# Patient Record
Sex: FEMALE | Race: ASIAN | NOT HISPANIC OR LATINO | Employment: PART TIME | URBAN - NONMETROPOLITAN AREA
[De-identification: names, ages, dates, MRNs, and addresses within clinical notes are randomized per-mention and may not be internally consistent; named-entity substitution may affect disease eponyms.]

---

## 2017-10-17 ENCOUNTER — APPOINTMENT (OUTPATIENT)
Dept: LAB | Facility: HOSPITAL | Age: 42
End: 2017-10-17

## 2017-10-17 ENCOUNTER — OFFICE VISIT (OUTPATIENT)
Dept: FAMILY MEDICINE CLINIC | Facility: CLINIC | Age: 42
End: 2017-10-17

## 2017-10-17 VITALS
HEART RATE: 65 BPM | HEIGHT: 65 IN | OXYGEN SATURATION: 97 % | SYSTOLIC BLOOD PRESSURE: 116 MMHG | BODY MASS INDEX: 30.54 KG/M2 | DIASTOLIC BLOOD PRESSURE: 64 MMHG | WEIGHT: 183.3 LBS

## 2017-10-17 DIAGNOSIS — I83.90 VARICOSE VEIN OF LEG: Primary | ICD-10-CM

## 2017-10-17 DIAGNOSIS — G89.29 CHRONIC RIGHT SHOULDER PAIN: ICD-10-CM

## 2017-10-17 DIAGNOSIS — Z00.00 HEALTH CARE MAINTENANCE: ICD-10-CM

## 2017-10-17 DIAGNOSIS — M25.511 CHRONIC RIGHT SHOULDER PAIN: ICD-10-CM

## 2017-10-17 LAB
ALBUMIN SERPL-MCNC: 4.1 G/DL (ref 3.4–4.8)
ALBUMIN/GLOB SERPL: 1.2 G/DL (ref 1.1–1.8)
ALP SERPL-CCNC: 42 U/L (ref 38–126)
ALT SERPL W P-5'-P-CCNC: 63 U/L (ref 9–52)
ANION GAP SERPL CALCULATED.3IONS-SCNC: 8 MMOL/L (ref 5–15)
ARTICHOKE IGE QN: 84 MG/DL (ref 1–129)
AST SERPL-CCNC: 61 U/L (ref 14–36)
BILIRUB SERPL-MCNC: 0.7 MG/DL (ref 0.2–1.3)
BUN BLD-MCNC: 13 MG/DL (ref 7–21)
BUN/CREAT SERPL: 25 (ref 7–25)
CALCIUM SPEC-SCNC: 8.5 MG/DL (ref 8.4–10.2)
CHLORIDE SERPL-SCNC: 103 MMOL/L (ref 95–110)
CHOLEST SERPL-MCNC: 173 MG/DL (ref 0–199)
CO2 SERPL-SCNC: 28 MMOL/L (ref 22–31)
CREAT BLD-MCNC: 0.52 MG/DL (ref 0.5–1)
DEPRECATED RDW RBC AUTO: 42 FL (ref 36.4–46.3)
ERYTHROCYTE [DISTWIDTH] IN BLOOD BY AUTOMATED COUNT: 12.4 % (ref 11.5–14.5)
GFR SERPL CREATININE-BSD FRML MDRD: 128 ML/MIN/1.73 (ref 58–135)
GFR SERPL CREATININE-BSD FRML MDRD: 155 ML/MIN/1.73 (ref 58–135)
GLOBULIN UR ELPH-MCNC: 3.3 GM/DL (ref 2.3–3.5)
GLUCOSE BLD-MCNC: 81 MG/DL (ref 60–100)
HCT VFR BLD AUTO: 38.6 % (ref 35–45)
HDLC SERPL-MCNC: 62 MG/DL (ref 60–200)
HGB BLD-MCNC: 12.7 G/DL (ref 12–15.5)
LDLC/HDLC SERPL: 1.66 {RATIO} (ref 0–3.22)
MCH RBC QN AUTO: 30.2 PG (ref 26.5–34)
MCHC RBC AUTO-ENTMCNC: 32.9 G/DL (ref 31.4–36)
MCV RBC AUTO: 91.9 FL (ref 80–98)
PLATELET # BLD AUTO: 278 10*3/MM3 (ref 150–450)
PMV BLD AUTO: 10 FL (ref 8–12)
POTASSIUM BLD-SCNC: 4.7 MMOL/L (ref 3.5–5.1)
PROT SERPL-MCNC: 7.4 G/DL (ref 6.3–8.6)
RBC # BLD AUTO: 4.2 10*6/MM3 (ref 3.77–5.16)
SODIUM BLD-SCNC: 139 MMOL/L (ref 137–145)
TRIGL SERPL-MCNC: 40 MG/DL (ref 20–199)
WBC NRBC COR # BLD: 5.79 10*3/MM3 (ref 3.2–9.8)

## 2017-10-17 PROCEDURE — 90471 IMMUNIZATION ADMIN: CPT | Performed by: FAMILY MEDICINE

## 2017-10-17 PROCEDURE — 36415 COLL VENOUS BLD VENIPUNCTURE: CPT | Performed by: STUDENT IN AN ORGANIZED HEALTH CARE EDUCATION/TRAINING PROGRAM

## 2017-10-17 PROCEDURE — 99203 OFFICE O/P NEW LOW 30 MIN: CPT | Performed by: FAMILY MEDICINE

## 2017-10-17 PROCEDURE — 90686 IIV4 VACC NO PRSV 0.5 ML IM: CPT | Performed by: FAMILY MEDICINE

## 2017-10-17 PROCEDURE — 80061 LIPID PANEL: CPT | Performed by: STUDENT IN AN ORGANIZED HEALTH CARE EDUCATION/TRAINING PROGRAM

## 2017-10-17 PROCEDURE — 85027 COMPLETE CBC AUTOMATED: CPT | Performed by: STUDENT IN AN ORGANIZED HEALTH CARE EDUCATION/TRAINING PROGRAM

## 2017-10-17 PROCEDURE — 80053 COMPREHEN METABOLIC PANEL: CPT | Performed by: STUDENT IN AN ORGANIZED HEALTH CARE EDUCATION/TRAINING PROGRAM

## 2017-10-17 NOTE — PROGRESS NOTES
I have reviewed the notes, assessments, and/or procedures performed. I concur with her/his documentation of Brittnee Boyd.     Silvano Bobby, DO

## 2017-10-17 NOTE — PROGRESS NOTES
ID: Brittnee Boyd    CC:   Chief Complaint   Patient presents with   • Establish Care       Subjective:     JIMMY Boyd is a 42 y.o. female who presents to [facility]. Patient is here with her sister in law to provide as a . Patient speaks chinese mandarin. P/t has no significant concurrent medical history. Denies any history of heart, kidney, and lung disease in the family. Patient states for the last 6 years she has had the varicose veins in her left leg on the medial side. She received some sort of shot in [place] 6 years ago and they had disappeared for a short time. She works as a  and her legs swell up when she stands for long periods of time. She was given compression stockings in the past which would help with the swelling.    She also states she has had general soreness in her right shoulder area which gets worse after long shifts at work. We went over stretching and exercise routines in clinic she can try to strengthen muscles.   Patient denies any headaches, SOB, fever, n/v.   Patient would like her routine blood work done as well. She would like a flu shot today.  Patient see's  Friday for AUB.  According to patient she was told everything is okay.   Preventative:  Over the past 2 weeks, have you felt down, depressed, or hopeless?No   Over the past 2 weeks, have you felt little interest or pleasure in doing things?No  Clinical depression screening refused by patient.No     On osteoporosis therapy?No     Past Medical Hx:  Past Medical History:   Diagnosis Date   • Abdominal pain, epigastric    • Acute pharyngitis    • Cough    • Diarrhea of presumed infectious origin    • Dysuria    • Epigastric pain    • Helicobacter positive gastritis    • Irregular menstrual cycle    • Irregular periods    • Onychomycosis    • Pain in right shoulder    • Pain in unspecified upper arm     BILATERAL   • Paronychia of finger    • Polyp of cervix    • Rhinitis    • Right flank pain    • Right  lower quadrant pain    • Upper respiratory infection    • Urinary tract infectious disease        Past Surgical Hx:  Past Surgical History:   Procedure Laterality Date   •  SECTION     • ENDOSCOPY  2014    EGD w/ tube 21185 (Normal esophagus. Erosive in stomach. Biopsy taken. Normal duodenum. Biopsy taken.)       Health Maintenance:  Health Maintenance   Topic Date Due   • TDAP/TD VACCINES (1 - Tdap) 1992   • INFLUENZA VACCINE  2017   • PAP SMEAR  2019       Current Meds:  No current outpatient prescriptions on file.    Allergies:  Review of patient's allergies indicates no known allergies.    Family Hx:  Family History   Problem Relation Age of Onset   • Breast cancer Neg Hx    • Ovarian cancer Neg Hx    • Uterine cancer Neg Hx    • Colon cancer Neg Hx         Social History:  Social History     Social History   • Marital status:      Spouse name: N/A   • Number of children: N/A   • Years of education: N/A     Occupational History   • Not on file.     Social History Main Topics   • Smoking status: Never Smoker   • Smokeless tobacco: Not on file   • Alcohol use No   • Drug use: No   • Sexual activity: Yes     Partners: Male     Birth control/ protection: None     Other Topics Concern   • Not on file     Social History Narrative       Review of Systems   Constitutional: Negative for activity change, fatigue and fever.   HENT: Negative.    Eyes: Negative.    Respiratory: Negative.  Negative for shortness of breath.    Cardiovascular: Positive for leg swelling. Negative for chest pain.   Gastrointestinal: Negative.  Negative for nausea and vomiting.   Endocrine: Negative.    Genitourinary: Positive for menstrual problem.   Musculoskeletal: Positive for myalgias. Negative for neck pain and neck stiffness.   Skin: Negative.    Allergic/Immunologic: Negative.    Neurological: Negative.    Hematological: Negative.    Psychiatric/Behavioral: Negative.            Objective:     BP  "116/64 (BP Location: Left arm, Patient Position: Sitting, Cuff Size: Adult)  Pulse 65  Ht 65\" (165.1 cm)  Wt 183 lb 4.8 oz (83.1 kg)  LMP 10/08/2017  SpO2 97%  BMI 30.5 kg/m2    Physical Exam   Constitutional: She is oriented to person, place, and time. She appears well-developed and well-nourished.   HENT:   Head: Normocephalic and atraumatic.   Right Ear: External ear normal.   Left Ear: External ear normal.   Nose: Nose normal.   Mouth/Throat: Oropharynx is clear and moist.   Eyes: Conjunctivae and EOM are normal. Pupils are equal, round, and reactive to light.   Neck: Normal range of motion. Neck supple. No JVD present. No tracheal deviation present. No thyromegaly present.   Cardiovascular: Normal rate, regular rhythm, normal heart sounds and intact distal pulses.  Exam reveals no gallop and no friction rub.    No murmur heard.  Pulmonary/Chest: Effort normal and breath sounds normal. She has no wheezes.   Abdominal: Soft. Bowel sounds are normal. She exhibits no distension. There is no tenderness.   Musculoskeletal: Normal range of motion. She exhibits no tenderness.   ROM normal while performing shoulder movements    Neurological: She is alert and oriented to person, place, and time. She has normal reflexes.   Skin: Skin is warm and dry.   Right medial leg: varicose veins   Psychiatric: She has a normal mood and affect. Her behavior is normal. Judgment and thought content normal.              Assessment/Plan:     Brittnee was seen today for establish care.    Diagnoses and all orders for this visit:    Chronic Varicose vein of leg  - continue use of compression stockings.     Chronic right shoulder pain  - muscle tightness, went over stretching and exercises; will f/u if pain continues    Health care maintenance  - Flu vaccine  -     CBC No Differential; Future  -     Comprehensive metabolic panel; Future  -     Lipid panel; Future          Follow-up:     Return in about 1 year (around 10/17/2018) for " Annual.      Goals:perform proper streching and shoulder exercises   Barriers to goals: compliance with stretching regimen    Health Maintenance   Topic Date Due   • TDAP/TD VACCINES (1 - Tdap) 02/22/1992   • INFLUENZA VACCINE  08/01/2017   • PAP SMEAR  09/13/2019       Patient does not smoke  does not drink  eat more fruits and vegetables, increase water intake and increase physical activity    RISK SCORE: 3          This document has been electronically signed by Feliberto Rodgers MD on October 17, 2017 11:48 AM

## 2017-10-24 ENCOUNTER — TELEPHONE (OUTPATIENT)
Dept: FAMILY MEDICINE CLINIC | Facility: CLINIC | Age: 42
End: 2017-10-24

## 2017-10-25 DIAGNOSIS — R79.89 ELEVATED LFTS: Primary | ICD-10-CM

## 2017-10-25 NOTE — PROGRESS NOTES
Lab results for Ms. Boyd showed mild AST ALT elevation in the 60's. Called patient to come back in one week for LFT. Patient understood and will come in then. Will place order now for liver function test.

## 2017-10-31 ENCOUNTER — LAB (OUTPATIENT)
Dept: LAB | Facility: HOSPITAL | Age: 42
End: 2017-10-31

## 2017-10-31 DIAGNOSIS — R79.89 ELEVATED LFTS: ICD-10-CM

## 2017-10-31 LAB
ALBUMIN SERPL-MCNC: 4.1 G/DL (ref 3.4–4.8)
ALP SERPL-CCNC: 44 U/L (ref 38–126)
ALT SERPL W P-5'-P-CCNC: 48 U/L (ref 9–52)
AST SERPL-CCNC: 59 U/L (ref 14–36)
BILIRUB CONJ SERPL-MCNC: 0 MG/DL (ref 0–0.3)
BILIRUB INDIRECT SERPL-MCNC: 0.5 MG/DL (ref 0–1.1)
BILIRUB SERPL-MCNC: 0.7 MG/DL (ref 0.2–1.3)
PROT SERPL-MCNC: 7.3 G/DL (ref 6.3–8.6)

## 2017-10-31 PROCEDURE — 36415 COLL VENOUS BLD VENIPUNCTURE: CPT

## 2017-10-31 PROCEDURE — 80076 HEPATIC FUNCTION PANEL: CPT | Performed by: STUDENT IN AN ORGANIZED HEALTH CARE EDUCATION/TRAINING PROGRAM

## 2017-11-14 ENCOUNTER — TELEPHONE (OUTPATIENT)
Dept: FAMILY MEDICINE CLINIC | Facility: CLINIC | Age: 42
End: 2017-11-14

## 2017-11-14 NOTE — TELEPHONE ENCOUNTER
PT CALLED FOR RESULTS FROM LABS    ASKING THAT YOU CALL BACK -[phone]     PLEASE AND AS SOON AS CAN.    SAID THAT IT HAS BEEN 2 WEEKS OR MORE

## 2018-05-03 ENCOUNTER — OFFICE VISIT (OUTPATIENT)
Dept: FAMILY MEDICINE CLINIC | Facility: CLINIC | Age: 43
End: 2018-05-03

## 2018-05-03 VITALS
DIASTOLIC BLOOD PRESSURE: 60 MMHG | OXYGEN SATURATION: 96 % | HEART RATE: 67 BPM | SYSTOLIC BLOOD PRESSURE: 92 MMHG | HEIGHT: 60 IN | WEIGHT: 139.25 LBS | BODY MASS INDEX: 27.34 KG/M2

## 2018-05-03 DIAGNOSIS — I83.92 VARICOSE VEINS OF LEFT LOWER EXTREMITY: Primary | ICD-10-CM

## 2018-05-03 PROCEDURE — 99213 OFFICE O/P EST LOW 20 MIN: CPT | Performed by: STUDENT IN AN ORGANIZED HEALTH CARE EDUCATION/TRAINING PROGRAM

## 2018-05-03 NOTE — PROGRESS NOTES
ID: Brittnee Boyd    CC:   Chief Complaint   Patient presents with   • Leg Pain     left leg        Subjective:     JIMMY Boyd is a 43 y.o. female who presents for Left Leg discomfort.  Patient has a history of bilateral varicose veins, worse on the left side for the last 7-8 years.  Patient works as a  and is on her feet all day.  Patient complains of throbbing pain which she rates a 4 out of 10.  Remains persistent throughout the day.  And states she's used compression stockings in the past on off but never consistently.  She reports the left leg pain started popping out after she had some sort of injection in [place] many years ago.  They have not gotten worse but she would like to get it taken care of.  Patient presented in the clinic with her father-in-law has a .  Patient denies any weight loss, fatigue, nausea vomiting, fever, chest pain, shortness of breath.    Preventative:  Over the past 2 weeks, have you felt down, depressed, or hopeless?No   Over the past 2 weeks, have you felt little interest or pleasure in doing things?No  Clinical depression screening refused by patient.No     On osteoporosis therapy?No     Past Medical Hx:  Past Medical History:   Diagnosis Date   • Abdominal pain, epigastric    • Acute pharyngitis    • Cough    • Diarrhea of presumed infectious origin    • Dysuria    • Epigastric pain    • Helicobacter positive gastritis    • Irregular menstrual cycle    • Irregular periods    • Onychomycosis    • Pain in right shoulder    • Pain in unspecified upper arm     BILATERAL   • Paronychia of finger    • Polyp of cervix    • Rhinitis    • Right flank pain    • Right lower quadrant pain    • Upper respiratory infection    • Urinary tract infectious disease        Past Surgical Hx:  Past Surgical History:   Procedure Laterality Date   •  SECTION     • ENDOSCOPY  2014    EGD w/ tube 68360 (Normal esophagus. Erosive in stomach. Biopsy taken. Normal  duodenum. Biopsy taken.)       Health Maintenance:  Health Maintenance   Topic Date Due   • TDAP/TD VACCINES (1 - Tdap) 02/22/1992   • INFLUENZA VACCINE  08/01/2018   • ANNUAL PHYSICAL  10/18/2018   • PAP SMEAR  09/13/2019       Current Meds:  No current outpatient prescriptions on file.    Allergies:  Review of patient's allergies indicates no known allergies.    Family Hx:  Family History   Problem Relation Age of Onset   • Breast cancer Neg Hx    • Ovarian cancer Neg Hx    • Uterine cancer Neg Hx    • Colon cancer Neg Hx         Social History:  Social History     Social History   • Marital status:      Spouse name: N/A   • Number of children: N/A   • Years of education: N/A     Occupational History   • Not on file.     Social History Main Topics   • Smoking status: Never Smoker   • Smokeless tobacco: Not on file   • Alcohol use No   • Drug use: No   • Sexual activity: Yes     Partners: Male     Birth control/ protection: None     Other Topics Concern   • Not on file     Social History Narrative   • No narrative on file       Review of Systems  General:negative for - chills, fatigue, fever, hot flashes, malaise, night sweats, weight gain or weight loss  Psychological: negative for - anxiety, depression, sleep disturbances or suicidal ideation  Ophthalmic: negative for - blurry vision or loss of vision  ENT: negative for - hearing change, nasal congestion or sore throat  Hematological and Lymphatic: negative for - jaundice  Endocrine: negative for - hair pattern changes, skin changes or temperature intolerance  Respiratory: no cough, shortness of breath, or wheezing  Cardiovascular: no chest pain, edema or dyspnea on exertion  Gastrointestinal: no  Nausea/vomiting, abdominal pain, change in bowel habits, or black or bloody stools  Genito-Urinary: no dysuria, trouble voiding, or hematuria  Musculoskeletal: negative for - joint pain . Positive for left calf pain  Neurological: negative for - dizziness,  "headaches, numbness/tingling or seizures  Dermatological: negative for rash and skin lesion changes        Objective:     BP 92/60 (BP Location: Left arm, Patient Position: Sitting, Cuff Size: Adult)   Pulse 67   Ht 152.4 cm (60\")   Wt 63.2 kg (139 lb 4 oz)   SpO2 96%   BMI 27.20 kg/m²     Physical Exam  General Appearance:    Alert, cooperative, no distress, appears stated age   Head:    Normocephalic, without obvious abnormality, atraumatic   Eyes:    PERRL, conjunctiva/corneas clear, EOM's intact   Ears:    Normal TM's and external ear canals, both ears   Nose:   Nares normal, septum midline, mucosa normal, no drainage     or sinus tenderness   Throat:   Lips, mucosa, and tongue normal; teeth and gums normal   Neck:   Supple, symmetrical, trachea midline, no adenopathy   Back:     Symmetric, no curvature, ROM normal   Lungs:     Clear to auscultation bilaterally, respirations unlabored   Chest Wall:    No tenderness or deformity    Heart:    Regular rate and rhythm, S1 and S2 normal, no murmur, rub    or gallop   Abdomen:     Soft, non-tender, bowel sounds active all four quadrants,     no masses, no organomegaly   Extremities:   Extremities normal, atraumatic, no cyanosis or edema. Varicose veins b/l lower extremities. Left more prominent than right    Pulses:   2+ and symmetric all extremities   Skin:   Skin color, texture, turgor normal, no rashes or lesions   Lymph nodes:   Cervical, supraclavicular nodes normal   Neurologic:   CNII-XII grossly intact              Assessment/Plan:     Brittnee was seen today for leg pain.    Diagnoses and all orders for this visit:    Varicose veins of left lower extremity  -     Compression Thigh High Stockings   - will reacess in 3 months and consider sclerotherapy.         Follow-up:     Return in about 3 months (around 8/3/2018) for Recheck, varicose veins.      Goals:   Use compression stockings consistently     Barriers to goals:adherence    Health Maintenance "   Topic Date Due   • TDAP/TD VACCINES (1 - Tdap) 02/22/1992   • INFLUENZA VACCINE  08/01/2018   • ANNUAL PHYSICAL  10/18/2018   • PAP SMEAR  09/13/2019       Tobacco: nonsmoker  Alcohol: does not drink  Lifestyle: Body mass index is 27.2 kg/m². eat more fruits and vegetables, decrease soda or juice intake and increase water intake    RISK SCORE: 2          This document has been electronically signed by Feliberto Rodgers MD on May 3, 2018 2:31 PM

## 2018-06-04 ENCOUNTER — OFFICE VISIT (OUTPATIENT)
Dept: FAMILY MEDICINE CLINIC | Facility: CLINIC | Age: 43
End: 2018-06-04

## 2018-06-04 VITALS
BODY MASS INDEX: 26.96 KG/M2 | HEIGHT: 60 IN | HEART RATE: 88 BPM | OXYGEN SATURATION: 99 % | WEIGHT: 137.3 LBS | DIASTOLIC BLOOD PRESSURE: 76 MMHG | SYSTOLIC BLOOD PRESSURE: 118 MMHG

## 2018-06-04 DIAGNOSIS — I83.93 VARICOSE VEINS OF BOTH LOWER EXTREMITIES: Primary | ICD-10-CM

## 2018-06-04 PROCEDURE — 99213 OFFICE O/P EST LOW 20 MIN: CPT | Performed by: STUDENT IN AN ORGANIZED HEALTH CARE EDUCATION/TRAINING PROGRAM

## 2018-06-04 NOTE — PROGRESS NOTES
I have seen the patient.  I have reviewed the notes, assessments, and/or procedures performed by Feliberto Rodgers MD , I concur with her/his documentation and assessment and plan for Brittnee Boyd.          This document has been electronically signed by Portia Bender MD on June 4, 2018 10:57 AM

## 2018-06-04 NOTE — PROGRESS NOTES
ID: Brittnee Boyd    CC:   Chief Complaint   Patient presents with   • Leg Pain     both legs       Subjective:     JIMMY Boyd is a 43 y.o. female who presents for bilateral  Leg discomfort.  Patient has a history of bilateral varicose veins,  for the last 7-8 years.   Patient complains of throbbing pain which she rates a 6 out of 10 now.  States she's used compression stockings consistently but has had zero relief.  Pain has gotten worse in last few months.  Patient denies any weight loss, fatigue, nausea vomiting, fever, chest pain, shortness of breath.    Preventative:  Over the past 2 weeks, have you felt down, depressed, or hopeless?No   Over the past 2 weeks, have you felt little interest or pleasure in doing things?No  Clinical depression screening refused by patient.No     On osteoporosis therapy?No     Past Medical Hx:  Past Medical History:   Diagnosis Date   • Abdominal pain, epigastric    • Acute pharyngitis    • Cough    • Diarrhea of presumed infectious origin    • Dysuria    • Epigastric pain    • Helicobacter positive gastritis    • Irregular menstrual cycle    • Irregular periods    • Onychomycosis    • Pain in right shoulder    • Pain in unspecified upper arm     BILATERAL   • Paronychia of finger    • Polyp of cervix    • Rhinitis    • Right flank pain    • Right lower quadrant pain    • Upper respiratory infection    • Urinary tract infectious disease        Past Surgical Hx:  Past Surgical History:   Procedure Laterality Date   •  SECTION     • ENDOSCOPY  2014    EGD w/ tube 01893 (Normal esophagus. Erosive in stomach. Biopsy taken. Normal duodenum. Biopsy taken.)       Health Maintenance:  Health Maintenance   Topic Date Due   • ANNUAL PHYSICAL  1976   • TDAP/TD VACCINES (1 - Tdap) 1992   • INFLUENZA VACCINE  2018   • PAP SMEAR  2019       Current Meds:  No current outpatient prescriptions on file.    Allergies:  Patient has no known  "allergies.    Family Hx:  Family History   Problem Relation Age of Onset   • Breast cancer Neg Hx    • Ovarian cancer Neg Hx    • Uterine cancer Neg Hx    • Colon cancer Neg Hx         Social History:  Social History     Social History   • Marital status:      Spouse name: N/A   • Number of children: N/A   • Years of education: N/A     Occupational History   • Not on file.     Social History Main Topics   • Smoking status: Never Smoker   • Smokeless tobacco: Not on file   • Alcohol use No   • Drug use: No   • Sexual activity: Yes     Partners: Male     Birth control/ protection: None     Other Topics Concern   • Not on file     Social History Narrative   • No narrative on file       Review of Systems  General:negative for - chills, fatigue, fever, hot flashes, malaise, night sweats, weight gain or weight loss  Psychological: negative for - anxiety, depression, sleep disturbances or suicidal ideation  Ophthalmic: negative for - blurry vision or loss of vision  ENT: negative for - hearing change, nasal congestion or sore throat  Hematological and Lymphatic: negative for - jaundice  Endocrine: negative for - hair pattern changes, skin changes or temperature intolerance  Respiratory: no cough, shortness of breath, or wheezing  Cardiovascular: no chest pain, edema or dyspnea on exertion  Gastrointestinal: no  Nausea/vomiting, abdominal pain, change in bowel habits, or black or bloody stools  Genito-Urinary: no dysuria, trouble voiding, or hematuria  Musculoskeletal: negative for - joint pain . Positive for left and right calf pain  Neurological: negative for - dizziness, headaches, numbness/tingling or seizures  Dermatological: negative for rash and skin lesion changes        Objective:     /76 (BP Location: Left arm, Patient Position: Sitting, Cuff Size: Adult)   Pulse 88   Ht 152.4 cm (60\")   Wt 62.3 kg (137 lb 4.8 oz)   SpO2 99%   BMI 26.81 kg/m²     Physical Exam  General Appearance:    Alert, " cooperative, no distress, appears stated age   Head:    Normocephalic, without obvious abnormality, atraumatic   Eyes:    PERRL, conjunctiva/corneas clear, EOM's intact   Ears:    Normal TM's and external ear canals, both ears   Nose:   Nares normal, septum midline, mucosa normal, no drainage     or sinus tenderness   Throat:   Lips, mucosa, and tongue normal; teeth and gums normal   Neck:   Supple, symmetrical, trachea midline, no adenopathy   Back:     Symmetric, no curvature, ROM normal   Lungs:     Clear to auscultation bilaterally, respirations unlabored   Chest Wall:    No tenderness or deformity    Heart:    Regular rate and rhythm, S1 and S2 normal, no murmur, rub    or gallop   Abdomen:     Soft, non-tender, bowel sounds active all four quadrants,     no masses, no organomegaly   Extremities:   Extremities normal, atraumatic, no cyanosis or edema. Varicose veins b/l lower extremities of saphenous veins. Left more prominent than right    Pulses:   2+ and symmetric all extremities   Skin:   Skin color, texture, turgor normal, no rashes or lesions   Lymph nodes:   Cervical, supraclavicular nodes normal   Neurologic:   CNII-XII grossly intact              Assessment/Plan:     Brittnee was seen today for leg pain.    Diagnoses and all orders for this visit:    Varicose veins of left lower extremity  -     Compression Thigh High Stockings did not help   - will send referral for Dr. Basilio who can perform laser ablation         Follow-up:     Return in about 6 months (around 12/4/2018).      Goals:   Use compression stockings consistently     Barriers to goals:adherence    Health Maintenance   Topic Date Due   • ANNUAL PHYSICAL  02/22/1976   • TDAP/TD VACCINES (1 - Tdap) 02/22/1992   • INFLUENZA VACCINE  08/01/2018   • PAP SMEAR  09/13/2019       Tobacco: nonsmoker  Alcohol: does not drink  Lifestyle: Body mass index is 26.81 kg/m². eat more fruits and vegetables, decrease soda or juice intake and increase water  intake    RISK SCORE: 2          This document has been electronically signed by Feliberto Rodgers MD on June 4, 2018 11:27 AM

## 2018-06-11 ENCOUNTER — OFFICE VISIT (OUTPATIENT)
Dept: CARDIOLOGY | Facility: CLINIC | Age: 43
End: 2018-06-11

## 2018-06-11 VITALS
DIASTOLIC BLOOD PRESSURE: 70 MMHG | SYSTOLIC BLOOD PRESSURE: 100 MMHG | HEART RATE: 88 BPM | HEIGHT: 60 IN | WEIGHT: 137 LBS | BODY MASS INDEX: 26.9 KG/M2

## 2018-06-11 DIAGNOSIS — I83.93 VARICOSE VEINS OF BOTH LOWER EXTREMITIES: Primary | ICD-10-CM

## 2018-06-11 PROCEDURE — 99244 OFF/OP CNSLTJ NEW/EST MOD 40: CPT | Performed by: INTERNAL MEDICINE

## 2018-06-11 NOTE — PROGRESS NOTES
Brittnee Boyd  43 y.o. female    2018  1. Varicose veins of both lower extremities        History of Present Illness    Ms. Boyd is a 43 yr old  lady, who presents for evaluation of varicose veins in both lower extremities, left more than right. History was limited since the patient could not understand English and her sister who was with her,had only limited knowledge of English.  Apparently, the patient has had varicose veins for about 8-10 years and has used compression stockings for the past several years.  She has significant pain in both lower extremities towards the end of the day and compression stockings does not seem to have helped.  She has had vein injections in the past in [place].  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   She is a nonsmoker and has family history of varicose veins.  No history of DVT or trauma is reported.  There is no history of previous cardiac issues.        SUBJECTIVE    No Known Allergies      Past Medical History:   Diagnosis Date   • Abdominal pain, epigastric    • Acute pharyngitis    • Cough    • Diarrhea of presumed infectious origin    • Dysuria    • Epigastric pain    • Helicobacter positive gastritis    • Irregular menstrual cycle    • Irregular periods    • Onychomycosis    • Pain in right shoulder    • Pain in unspecified upper arm     BILATERAL   • Paronychia of finger    • Polyp of cervix    • Rhinitis    • Right flank pain    • Right lower quadrant pain    • Upper respiratory infection    • Urinary tract infectious disease          Past Surgical History:   Procedure Laterality Date   •  SECTION     • ENDOSCOPY  2014    EGD w/ tube 48241 (Normal esophagus. Erosive in stomach. Biopsy taken. Normal duodenum. Biopsy taken.)         Family History   Problem Relation Age of Onset   • Breast cancer Neg Hx    • Ovarian cancer Neg Hx    • Uterine cancer Neg Hx    • Colon cancer Neg Hx          Social History  "    Social History   • Marital status:      Spouse name: N/A   • Number of children: N/A   • Years of education: N/A     Occupational History   • Not on file.     Social History Main Topics   • Smoking status: Never Smoker   • Smokeless tobacco: Not on file   • Alcohol use No   • Drug use: No   • Sexual activity: Yes     Partners: Male     Birth control/ protection: None     Other Topics Concern   • Not on file     Social History Narrative   • No narrative on file         No current outpatient prescriptions on file.     No current facility-administered medications for this visit.          OBJECTIVE    /70   Pulse 88   Ht 152.4 cm (60\")   Wt 62.1 kg (137 lb)   BMI 26.76 kg/m²         Review of Systems     Constitutional:  Denies recent weight loss, weight gain, fever or chills, no change in exercise tolerance     HENT:  Denies any hearing loss, epistaxis, hoarseness, or difficulty speaking.     Eyes: Wears eyeglasses or contact lenses     Respiratory:  Denies dyspnea with exertion,no cough, wheezing, or hemoptysis.     Cardiovascular: Negative for palpations, chest pain, orthopnea, PND, peripheral edema, syncope, or claudication.     Gastrointestinal:  Denies change in bowel habits, dyspepsia, ulcer disease, hematochezia, or melena.     Endocrine: Negative for cold intolerance, heat intolerance, polydipsia, polyphagia and polyuria.     Genitourinary: Negative.      Musculoskeletal: Denies any history of arthritic symptoms or back problems . H/o varicose veins for more than 8-10 years    Skin:  Denies any change in hair or nails, rashes, or skin lesions.     Allergic/Immunologic: Negative.  Negative for environmental allergies, food allergies and immunocompromised state.     Neurological:  Denies any history of recurrent headaches, strokes, TIA, or seizure disorder.     Physical Exam     Constitutional: Cooperative, alert and oriented, well-developed, well-nourished, in no acute distress.     HENT: "   Head: Normocephalic, normal hair patterns, no masses or tenderness.  Ears, Nose, and Throat: No gross abnormalities. No pallor or cyanosis.   Eyes: EOMS intact, PERRL, conjunctivae and lids unremarkable. Fundoscopic exam and visual fields not performed.   Neck: No palpable masses or adenopathy, no thyromegaly, no JVD, carotid pulses are full and equal bilaterally and without  Bruits.     Cardiovascular: Regular rhythm, S1 and S2 normal, no S3 or S4. No murmurs, gallops, or rubs detected.     Pulmonary/Chest: Chest: normal symmetry,  normal respiratory excursion, no intercostal retraction, no use of accessory muscles.            Pulmonary: Normal breath sounds. No rales or ronchi.    Abdominal: Abdomen soft, bowel sounds normoactive, no masses, no hepatosplenomegaly, non-tender, no bruits.     Musculoskeletal: No deformities, clubbing, cyanosis, erythema, or edema observed.  Extensive varicose veins on both sides. Left>Right.  Involves thighs and calf on both sides. Reticular veins on both sides around the ankles.    Neurological: No gross motor or sensory deficits noted, affect appropriate, oriented to time, person, place.     Skin: Warm and dry to the touch.  Discoloration of skin around the ankles on both sides        Procedures      Lab Results   Component Value Date    WBC 5.79 10/17/2017    HGB 12.7 10/17/2017    HCT 38.6 10/17/2017    MCV 91.9 10/17/2017     10/17/2017     Lab Results   Component Value Date    GLUCOSE 81 10/17/2017    BUN 13 10/17/2017    CREATININE 0.52 10/17/2017    EGFRIFNONA 128 10/17/2017    EGFRIFAFRI 155 (H) 10/17/2017    BCR 25.0 10/17/2017    CO2 28.0 10/17/2017    CALCIUM 8.5 10/17/2017    ALBUMIN 4.10 10/31/2017    LABIL2 1.2 10/17/2017    AST 59 (H) 10/31/2017    ALT 48 10/31/2017     Lab Results   Component Value Date    CHOL 173 10/17/2017     Lab Results   Component Value Date    TRIG 40 10/17/2017     Lab Results   Component Value Date    HDL 62 10/17/2017     No  components found for: LDLCALC  Lab Results   Component Value Date    LDL 84 10/17/2017     No results found for: HDLLDLRATIO  No components found for: CHOLHDL  No results found for: HGBA1C  Lab Results   Component Value Date    TSH 1.48 09/13/2016           ASSESSMENT AND PLAN  Ms. Boyd is a 43 yr old lady with symptomatic varicose veins involving both lower extremities but left more than right.  She has extensive varicose veins on the thighs and calf on the left side and similar findings on the right side to a lesser extent.  Reticular veins around the ankles on both sides.  No ulceration noted.  The plan would be to proceed with venous Doppler studies to assess the deep and superficial systems on both sides.  I have advised her to continue using compression stockings, grade 2, thigh-high.  Further recommendations will follow.  If she has significant venous reflux involving the great saphenous vein system, she will benefit from EVLT therapy.    Brittnee was seen today for varicose veins.    Diagnoses and all orders for this visit:    Varicose veins of both lower extremities  -     Duplex Venous Lower Extremity - Bilateral CAR; Future        Georgina Lopez MD  6/11/2018  5:07 PM

## 2018-09-21 ENCOUNTER — OFFICE VISIT (OUTPATIENT)
Dept: CARDIOLOGY | Facility: CLINIC | Age: 43
End: 2018-09-21

## 2018-09-21 VITALS
HEART RATE: 69 BPM | DIASTOLIC BLOOD PRESSURE: 62 MMHG | WEIGHT: 137 LBS | SYSTOLIC BLOOD PRESSURE: 100 MMHG | OXYGEN SATURATION: 99 % | HEIGHT: 60 IN | BODY MASS INDEX: 26.9 KG/M2

## 2018-09-21 DIAGNOSIS — I83.93 VARICOSE VEINS OF BOTH LOWER EXTREMITIES: Primary | ICD-10-CM

## 2018-09-21 PROCEDURE — 99215 OFFICE O/P EST HI 40 MIN: CPT | Performed by: INTERNAL MEDICINE

## 2018-09-21 NOTE — PROGRESS NOTES
Brittnee Boyd  43 y.o. female    09/21/2018  1. Varicose veins of both lower extremities        History of Present Illness    Ms. Deb Beaulieu is here for follow-up of her above stated problems.  She underwent venous Doppler studies of the lower extremities for symptomatic varicose veins in July 2018 and the findings are as follows:    On the right side the deep veins was studied from the level of the common femoral vein to the posterior tibial vein.  There was no evidence of DVT.  The common femoral vein had mild reflux up 0.47 seconds and superficial femoral vein has reflux up to 0.73 seconds  The great saphenous vein measured 6.5 mm at the saphenofemoral junction, 4.7 mm at the mid thigh, 4.5 mm at the knee and 4.6 mm at the midcalf  There was significant reflux up to 2.16 seconds at the saphenofemoral junction, 3.31+ seconds at the mid by, 3.24+  seconds at the knee and 2.6 seconds at the midcalf  The small saphenous vein measured 8.3 mm at the SPJ and 3.8 mm at the midcalf.  There was significant reflux up to 3.18 seconds at the SPJ and 1.8 sec at the mid calf.     On the left side the deep veins were distended from the level of the common femoral vein to the posterior tibial vein. There was no evidence of DVT.  Reflux up to 1 second was noted in the common femoral vein.  The great saphenous vein measured 7.6 mm at the saphenofemoral junction 5 mm at the mid thigh 4 mm at the level of the knee.  Significant reflux up to 1.2 seconds was noted at the saphenofemoral junction 3.05+ seconds at the knee and 2.27+ seconds at the midcalf  The small saphenous vein measured 11.7 mm at the SPJ and 4 mm at the midcalf there is significant reflux up to 2.31+ seconds at the SPJ and 2.39+ seconds at the midcalf.     Impression: No evidence of DVT bilaterally  Significant venous reflux noted in the great saphenous vein system and small saphenous vein system bilaterally at multiple levels.  Reflux up to 1 second noted in the  common femoral vein on the left side and 0.73 seconds in the superficial femoral vein on the right side  The great saphenous vein was superficial from the level of the mid thigh bilaterally.  The left great saphenous vein was tortuous below the level of the knee     The patient has been using compression stockings on a regular basis for the past several years.  She's had significant pain in both lower extremities which seemed to get worse towards the end of the day.  It is associated tingling, numbness.  No definite ulceration is noted.  She has prominent varicose veins involving both lower extremities left more than right with extensive reticular veins on both sides around the ankles.    SUBJECTIVE    No Known Allergies      Past Medical History:   Diagnosis Date   • Abdominal pain, epigastric    • Acute pharyngitis    • Cough    • Diarrhea of presumed infectious origin    • Dysuria    • Epigastric pain    • Helicobacter positive gastritis    • Irregular menstrual cycle    • Irregular periods    • Onychomycosis    • Pain in right shoulder    • Pain in unspecified upper arm     BILATERAL   • Paronychia of finger    • Polyp of cervix    • Rhinitis    • Right flank pain    • Right lower quadrant pain    • Upper respiratory infection    • Urinary tract infectious disease          Past Surgical History:   Procedure Laterality Date   •  SECTION     • ENDOSCOPY  2014    EGD w/ tube 73741 (Normal esophagus. Erosive in stomach. Biopsy taken. Normal duodenum. Biopsy taken.)         Family History   Problem Relation Age of Onset   • Breast cancer Neg Hx    • Ovarian cancer Neg Hx    • Uterine cancer Neg Hx    • Colon cancer Neg Hx          Social History     Social History   • Marital status:      Spouse name: N/A   • Number of children: N/A   • Years of education: N/A     Occupational History   • Not on file.     Social History Main Topics   • Smoking status: Never Smoker   • Smokeless tobacco: Not on  "file   • Alcohol use No   • Drug use: No   • Sexual activity: Yes     Partners: Male     Birth control/ protection: None     Other Topics Concern   • Not on file     Social History Narrative   • No narrative on file         No current outpatient prescriptions on file.     No current facility-administered medications for this visit.          OBJECTIVE    /62   Pulse 69   Ht 152.4 cm (60\")   Wt 62.1 kg (137 lb)   SpO2 99%   BMI 26.76 kg/m²         Review of Systems     Constitutional:  Denies recent weight loss, weight gain, fever or chills, no change in exercise tolerance     HENT:  Denies any hearing loss, epistaxis, hoarseness, or difficulty speaking.     Eyes: Wears eyeglasses or contact lenses     Respiratory:  Denies dyspnea with exertion,no cough, wheezing, or hemoptysis.     Cardiovascular: Negative for palpations, chest pain, orthopnea, PND, peripheral edema, syncope, or claudication.     Gastrointestinal:  Denies change in bowel habits, dyspepsia, ulcer disease, hematochezia, or melena.     Endocrine: Negative for cold intolerance, heat intolerance, polydipsia, polyphagia and polyuria.     Genitourinary: Negative.      Musculoskeletal: See history of present illness      Physical Exam     Constitutional: Cooperative, alert and oriented,  in no acute distress.     HENT:   Head: Normocephalic, normal hair patterns, no masses or tenderness.  Ears, Nose, and Throat: No gross abnormalities. No pallor or cyanosis.   Eyes: EOMS intact, PERRL, conjunctivae and lids unremarkable. Fundoscopic exam and visual fields not performed.   Neck: No palpable masses or adenopathy, no thyromegaly, no JVD, carotid pulses are full and equal bilaterally and without  Bruits.     Cardiovascular: Regular rhythm, S1 and S2 normal, no S3 or S4.  No murmurs, gallops, or rubs detected.     Pulmonary/Chest: Chest: normal symmetry, normal respiratory excursion, no intercostal retraction, no use of accessory muscles.            " Pulmonary: Normal breath sounds. No rales or ronchi.    Abdominal: Abdomen soft, bowel sounds normoactive, no masses, no hepatosplenomegaly, non-tender, no bruits.     Musculoskeletal: No deformities, clubbing, cyanosis, erythema, or edema observed.     Neurological: No gross motor or sensory deficits noted, affect appropriate, oriented to time, person, place.     Skin: Warm and dry to the touch, no apparent skin lesions or masses noted.     Psychiatric: She has a normal mood and affect. Her behavior is normal. Judgment and thought content normal.         Procedures      Lab Results   Component Value Date    WBC 5.79 10/17/2017    HGB 12.7 10/17/2017    HCT 38.6 10/17/2017    MCV 91.9 10/17/2017     10/17/2017     Lab Results   Component Value Date    GLUCOSE 81 10/17/2017    BUN 13 10/17/2017    CREATININE 0.52 10/17/2017    EGFRIFNONA 128 10/17/2017    EGFRIFAFRI 155 (H) 10/17/2017    BCR 25.0 10/17/2017    CO2 28.0 10/17/2017    CALCIUM 8.5 10/17/2017    ALBUMIN 4.10 10/31/2017    AST 59 (H) 10/31/2017    ALT 48 10/31/2017     Lab Results   Component Value Date    CHOL 173 10/17/2017     Lab Results   Component Value Date    TRIG 40 10/17/2017     Lab Results   Component Value Date    HDL 62 10/17/2017     No components found for: LDLCALC  Lab Results   Component Value Date    LDL 84 10/17/2017     No results found for: HDLLDLRATIO  No components found for: CHOLHDL  No results found for: HGBA1C  Lab Results   Component Value Date    TSH 1.48 09/13/2016           ASSESSMENT AND PLAN  Ms. Boyd has a long history of bilateral varicose veins which is symptomatic with pain, tingling in both lower extremities.  She has significant venous reflux as demonstrated by ultrasound studies as described under history of present illness.  I believe that she would benefit from Venaseal procedure for closure of the varicose veins.  The left GSV will be treated first, since her symptoms are more prominent on the left  side.  Since the patient did not understand English about 45 minutes was spent in explaining the procedure in detail, the preparation, the potential side effects and complications and recovery using a Mandarin Chinese speaking  using the professional service.  All risks/ benefits of the procedure were explained to her in detail.  Questions were answered to her satisfaction.  She will be ASA 2 and Mallampati 2.        Brittnee was seen today for follow-up.    Diagnoses and all orders for this visit:    Varicose veins of both lower extremities        Georgina Lopez MD  9/21/2018  12:12 PM

## 2018-09-25 ENCOUNTER — PROCEDURE VISIT (OUTPATIENT)
Dept: CARDIOLOGY | Facility: CLINIC | Age: 43
End: 2018-09-25

## 2018-09-25 DIAGNOSIS — I83.93 VARICOSE VEINS OF BOTH LOWER EXTREMITIES: Primary | ICD-10-CM

## 2018-09-25 PROCEDURE — 36482 ENDOVEN THER CHEM ADHES 1ST: CPT | Performed by: INTERNAL MEDICINE

## 2018-09-25 NOTE — PROGRESS NOTES
Procedure Note:     Indication: Mrs. Deb Beaulieu is a 43 yr old  female who was evaluated for long standing varicose veins with pain in both lower extremities and noted to have significant venous reflux in the great saphenous vein on both sides.  Her symptoms did not improve in spite of compression stockings for several years and hence Endovenous ablation of the great saphenous vein was recommended.  The patient had more symptoms on the left side with prominent varicose veins and hence this side was treated.     Procedure Performed: Endovenous Ablation of the Left Great Saphenous Vein with VenaSeal Closure System     Procedure: Duplex ultrasound was used to map out the insufficient vein on the left side and access was determined and marked on the overlying skin.  The depth and diameter of the vein to be treated was documented.The patient was placed supine on the procedure table and the left leg prepped and draped using sterile technique.  Ultrasound guidance was again used to localize the access site. 1% Lidocaine was injected as a local anesthetic in the subcutaneous tissue at the target location in the GSV in the leg just below the knee. Using ultrasound guidance, access was gained at this location with a 19-gauge needle and followed by introduction of a short guidewire, location confirmed with ultrasound. A small, 2-3 mm incision was made at the access site to allow for introduction and placement of the 7 Fr x 7 cm introducer/dilator.  The dilator and guidewire were removed.  The 0.035 guidewire from the Venaseal kit was then introduced and positioned at the saphenofemoral junction using ultrasound guidance.  The 80 cm  7 Scottish introducer sheath/dilator was positioned 5 cm from the saphenofemoral junction. The guidewire and dilator were removed, and the remaining sheath was flushed with sterile saline, with a syringe remaining in place prior to the next steps.  The cyanoacrylate adhesive was precisely  "primed into the 5 F delivery catheter and this catheter and this catheter/syringe combination was attached within the dispenser gun. This \"assembly\"was introduced through the 7 F sheath and positioned 5 cms caudal to the saphenofemoral junction under ultrasound guidance. The steps from the IFU were followed for dispensing amounts, locations and compression times, e.g 2 aliquots proximally with 3 minutes of compression, and 1 aliquot every 3 cm distally with 30 sec of compression along the course of the vessel. Following the last injection and compression sequence, the catheter and introducer sheath were pulled out from the access site. Hemostasis was achieved with amaris compression and an adhesive bandage was applied to the incision. Ultrasound confirmed complete coaptation and closure of the treated segments of the left GSV and the absence of any DVT at the saphenofemoral junction.     The length of the vein treated was 36 cm and the amount of cyanoacrylate used was 1.1 cc.     The drapes were removed and the patient cleaned and prepared for discharge. Post op ultrasound check was scheduled for 72 hrs after the procedure and post procedure instructions were given.     Patient tolerated the procedure well and there were no immediate complications noted.        "

## 2018-11-19 DIAGNOSIS — I83.93 VARICOSE VEINS OF BOTH LOWER EXTREMITIES, UNSPECIFIED WHETHER COMPLICATED: Primary | ICD-10-CM

## 2018-11-26 ENCOUNTER — DOCUMENTATION (OUTPATIENT)
Dept: CARDIOLOGY | Facility: CLINIC | Age: 43
End: 2018-11-26

## 2018-11-26 NOTE — PROGRESS NOTES
Called patients caregiver to notify her of the patients venaseal appointment tomorrow at 10 , Patients caregiver was unsure if they could make it and stated she will call me back sometime today and let our office know.

## 2018-11-27 ENCOUNTER — PROCEDURE VISIT (OUTPATIENT)
Dept: CARDIOLOGY | Facility: CLINIC | Age: 43
End: 2018-11-27

## 2018-11-27 DIAGNOSIS — I83.813 VARICOSE VEINS OF BOTH LOWER EXTREMITIES WITH PAIN: Primary | ICD-10-CM

## 2018-11-27 NOTE — PROGRESS NOTES
Procedure Note:     Indication: Mrs. Deb Beaulieu is a 43 yr old  female who was evaluated for long standing varicose veins with pain in both lower extremities and noted to have significant venous reflux in the great saphenous vein on both sides.  Her symptoms did not improve in spite of compression stockings for several years and hence Endovenous ablation of the great saphenous vein was recommended.  Endovenous ablation of the left great saphenous vein was performed in September 2018.     The patient was brought to the procedure room and the right leg was prepped and draped in the usual fashion.  Using ultrasound guidance the entire length of the great saphenous vein was mapped and marked  using IE 33 [employer] system.  After administration of 1% lidocaine multiple attempts were made to access the great saphenous vein.  Initially the vein was accessed in the leg without problem using modified Seldinger technique but I was unable to advance the wire past the upper part of the leg.  Due to extreme tortuosity and venous valve I was unable to advance a wire passed this point. Attempts were made to access the vein at the level of the knee and in the lower thigh, but due to intense vasospasm, I was unable to access the GSV.  Hence the procedure was stopped.  Attempt will be made at a later date to perform the procedure.  The patient has been advised to hydrate herself very well prior to the procedure.

## 2018-12-10 ENCOUNTER — APPOINTMENT (OUTPATIENT)
Dept: LAB | Facility: HOSPITAL | Age: 43
End: 2018-12-10

## 2018-12-10 ENCOUNTER — OFFICE VISIT (OUTPATIENT)
Dept: FAMILY MEDICINE CLINIC | Facility: CLINIC | Age: 43
End: 2018-12-10

## 2018-12-10 VITALS
BODY MASS INDEX: 27.84 KG/M2 | SYSTOLIC BLOOD PRESSURE: 110 MMHG | DIASTOLIC BLOOD PRESSURE: 74 MMHG | OXYGEN SATURATION: 97 % | HEIGHT: 60 IN | HEART RATE: 74 BPM | WEIGHT: 141.8 LBS

## 2018-12-10 DIAGNOSIS — Z00.00 HEALTHCARE MAINTENANCE: Primary | ICD-10-CM

## 2018-12-10 DIAGNOSIS — Z00.00 ANNUAL PHYSICAL EXAM: ICD-10-CM

## 2018-12-10 LAB
25(OH)D3 SERPL-MCNC: 37.4 NG/ML (ref 30–100)
ALBUMIN SERPL-MCNC: 4.2 G/DL (ref 3.4–4.8)
ALBUMIN/GLOB SERPL: 1.5 G/DL (ref 1.1–1.8)
ALP SERPL-CCNC: 47 U/L (ref 38–126)
ALT SERPL W P-5'-P-CCNC: 44 U/L (ref 9–52)
ANION GAP SERPL CALCULATED.3IONS-SCNC: 7 MMOL/L (ref 5–15)
AST SERPL-CCNC: 56 U/L (ref 14–36)
BILIRUB SERPL-MCNC: 0.3 MG/DL (ref 0.2–1.3)
BUN BLD-MCNC: 17 MG/DL (ref 7–21)
BUN/CREAT SERPL: 29.3 (ref 7–25)
CALCIUM SPEC-SCNC: 8.3 MG/DL (ref 8.4–10.2)
CHLORIDE SERPL-SCNC: 101 MMOL/L (ref 95–110)
CO2 SERPL-SCNC: 28 MMOL/L (ref 22–31)
CREAT BLD-MCNC: 0.58 MG/DL (ref 0.5–1)
DEPRECATED RDW RBC AUTO: 41.2 FL (ref 36.4–46.3)
ERYTHROCYTE [DISTWIDTH] IN BLOOD BY AUTOMATED COUNT: 12.4 % (ref 11.5–14.5)
GFR SERPL CREATININE-BSD FRML MDRD: 112 ML/MIN/1.73 (ref 58–135)
GFR SERPL CREATININE-BSD FRML MDRD: 136 ML/MIN/1.73 (ref 58–135)
GLOBULIN UR ELPH-MCNC: 2.8 GM/DL (ref 2.3–3.5)
GLUCOSE BLD-MCNC: 100 MG/DL (ref 60–100)
HCT VFR BLD AUTO: 37.5 % (ref 35–45)
HGB BLD-MCNC: 12.7 G/DL (ref 12–15.5)
IRON 24H UR-MRATE: 57 MCG/DL (ref 37–170)
MCH RBC QN AUTO: 30.8 PG (ref 26.5–34)
MCHC RBC AUTO-ENTMCNC: 33.9 G/DL (ref 31.4–36)
MCV RBC AUTO: 90.8 FL (ref 80–98)
PLATELET # BLD AUTO: 272 10*3/MM3 (ref 150–450)
PMV BLD AUTO: 9.6 FL (ref 8–12)
POTASSIUM BLD-SCNC: 4 MMOL/L (ref 3.5–5.1)
PROT SERPL-MCNC: 7 G/DL (ref 6.3–8.6)
RBC # BLD AUTO: 4.13 10*6/MM3 (ref 3.77–5.16)
SODIUM BLD-SCNC: 136 MMOL/L (ref 137–145)
T4 FREE SERPL-MCNC: 0.93 NG/DL (ref 0.78–2.19)
TSH SERPL DL<=0.05 MIU/L-ACNC: 1.32 MIU/ML (ref 0.46–4.68)
VIT B12 BLD-MCNC: 644 PG/ML (ref 239–931)
WBC NRBC COR # BLD: 5.54 10*3/MM3 (ref 3.2–9.8)

## 2018-12-10 PROCEDURE — 80050 GENERAL HEALTH PANEL: CPT | Performed by: STUDENT IN AN ORGANIZED HEALTH CARE EDUCATION/TRAINING PROGRAM

## 2018-12-10 PROCEDURE — 90674 CCIIV4 VAC NO PRSV 0.5 ML IM: CPT | Performed by: STUDENT IN AN ORGANIZED HEALTH CARE EDUCATION/TRAINING PROGRAM

## 2018-12-10 PROCEDURE — 84439 ASSAY OF FREE THYROXINE: CPT | Performed by: STUDENT IN AN ORGANIZED HEALTH CARE EDUCATION/TRAINING PROGRAM

## 2018-12-10 PROCEDURE — 82607 VITAMIN B-12: CPT | Performed by: STUDENT IN AN ORGANIZED HEALTH CARE EDUCATION/TRAINING PROGRAM

## 2018-12-10 PROCEDURE — 82306 VITAMIN D 25 HYDROXY: CPT | Performed by: STUDENT IN AN ORGANIZED HEALTH CARE EDUCATION/TRAINING PROGRAM

## 2018-12-10 PROCEDURE — 90471 IMMUNIZATION ADMIN: CPT | Performed by: STUDENT IN AN ORGANIZED HEALTH CARE EDUCATION/TRAINING PROGRAM

## 2018-12-10 PROCEDURE — 99396 PREV VISIT EST AGE 40-64: CPT | Performed by: STUDENT IN AN ORGANIZED HEALTH CARE EDUCATION/TRAINING PROGRAM

## 2018-12-10 PROCEDURE — 36415 COLL VENOUS BLD VENIPUNCTURE: CPT | Performed by: STUDENT IN AN ORGANIZED HEALTH CARE EDUCATION/TRAINING PROGRAM

## 2018-12-10 PROCEDURE — 83540 ASSAY OF IRON: CPT | Performed by: STUDENT IN AN ORGANIZED HEALTH CARE EDUCATION/TRAINING PROGRAM

## 2018-12-17 NOTE — PROGRESS NOTES
I have seen this patient and discussed the case with resident and agree with the assessment and plan.  BHAVANA Granda M.D.

## 2018-12-17 NOTE — PROGRESS NOTES
ID: Brittnee Boyd    CC:   Chief Complaint   Patient presents with   • Varicose Veins   • Annual Exam       Subjective:     JIMMY Boyd is a 43 y.o. female who presents for annual exam and varicose veins.   Patient is here with her son and  who will be translating. Patient speaks chinese mandarin.   Varicose veins  For the last 6 years she has had the varicose veins in her left leg on the medial side. Patient was referred to Dr. Basilio for endovenous ablation on 11/27 but access was difficult to obtain due to extreme tortuosity. Patient is to return back after consuming much water for repeat procedure. She continues to wear compression stockings which has helped very slightly.   Health maintenance     Patient is due for her mammogram, and annual labs. She would also like the flu vaccine today. Patient states she would like to have her iron levels, vitamin D, B12 checked. Patient states she gets her annual teeth cleaning. Vision screen was w/n/l.   Patient denies any headaches, SOB, fever, n/v.   Patient does not smoke or use any illicit substances or drink alcohol.   Preventative:  Over the past 2 weeks, have you felt down, depressed, or hopeless?No   Over the past 2 weeks, have you felt little interest or pleasure in doing things?No  Clinical depression screening refused by patient.No     On osteoporosis therapy?No     Past Medical Hx:  Past Medical History:   Diagnosis Date   • Abdominal pain, epigastric    • Acute pharyngitis    • Cough    • Diarrhea of presumed infectious origin    • Dysuria    • Epigastric pain    • Helicobacter positive gastritis    • Irregular menstrual cycle    • Irregular periods    • Onychomycosis    • Pain in right shoulder    • Pain in unspecified upper arm     BILATERAL   • Paronychia of finger    • Polyp of cervix    • Rhinitis    • Right flank pain    • Right lower quadrant pain    • Upper respiratory infection    • Urinary tract infectious disease        Past Surgical  Hx:  Past Surgical History:   Procedure Laterality Date   •  SECTION     • ENDOSCOPY  2014    EGD w/ tube 49069 (Normal esophagus. Erosive in stomach. Biopsy taken. Normal duodenum. Biopsy taken.)       Health Maintenance:  Health Maintenance   Topic Date Due   • ANNUAL PHYSICAL  1976   • HEPATITIS A VACCINE ADULT (1 of 2) 1991   • TDAP/TD VACCINES (1 - Tdap) 1992   • PAP SMEAR  2019   • INFLUENZA VACCINE  Completed       Current Meds:  No current outpatient medications on file.    Allergies:  Patient has no known allergies.    Family Hx:  Family History   Problem Relation Age of Onset   • Breast cancer Neg Hx    • Ovarian cancer Neg Hx    • Uterine cancer Neg Hx    • Colon cancer Neg Hx         Social History:  Social History     Socioeconomic History   • Marital status:      Spouse name: Not on file   • Number of children: Not on file   • Years of education: Not on file   • Highest education level: Not on file   Social Needs   • Financial resource strain: Not on file   • Food insecurity - worry: Not on file   • Food insecurity - inability: Not on file   • Transportation needs - medical: Not on file   • Transportation needs - non-medical: Not on file   Occupational History   • Not on file   Tobacco Use   • Smoking status: Never Smoker   • Smokeless tobacco: Never Used   Substance and Sexual Activity   • Alcohol use: No   • Drug use: No   • Sexual activity: Yes     Partners: Male     Birth control/protection: None   Other Topics Concern   • Not on file   Social History Narrative   • Not on file       Review of Systems   Constitutional: Negative for activity change, appetite change, chills and fatigue.   HENT: Negative.  Negative for congestion.    Eyes: Negative.    Respiratory: Negative.  Negative for shortness of breath.    Cardiovascular: Positive for leg swelling. Negative for chest pain and palpitations.   Gastrointestinal: Negative.  Negative for abdominal  "distention, abdominal pain, constipation, nausea and vomiting.   Endocrine: Negative.  Negative for polyphagia and polyuria.   Genitourinary: Positive for menstrual problem. Negative for difficulty urinating and flank pain.   Musculoskeletal: Negative for arthralgias, myalgias and neck stiffness.   Skin: Negative.    Allergic/Immunologic: Negative.    Neurological: Negative.  Negative for dizziness and headaches.   Hematological: Negative.    Psychiatric/Behavioral: Negative.  Negative for agitation and behavioral problems.           Objective:     /74   Pulse 74   Ht 152.4 cm (60\")   Wt 64.3 kg (141 lb 12.8 oz)   SpO2 97%   BMI 27.69 kg/m²     Physical Exam   Constitutional: She is oriented to person, place, and time. She appears well-developed and well-nourished.   HENT:   Head: Normocephalic and atraumatic.   Right Ear: External ear normal.   Left Ear: External ear normal.   Nose: Nose normal.   Mouth/Throat: Oropharynx is clear and moist.   Eyes: Conjunctivae and EOM are normal. Pupils are equal, round, and reactive to light.   Neck: Normal range of motion. Neck supple. No JVD present. No tracheal deviation present. No thyromegaly present.   Cardiovascular: Normal rate, regular rhythm, normal heart sounds and intact distal pulses. Exam reveals no gallop and no friction rub.   No murmur heard.  Pulmonary/Chest: Effort normal and breath sounds normal. She has no wheezes.   Abdominal: Soft. Bowel sounds are normal. She exhibits no distension. There is no tenderness.   Musculoskeletal: Normal range of motion. She exhibits no tenderness.   Neurological: She is alert and oriented to person, place, and time. She has normal reflexes.   Skin: Skin is warm and dry.   Right medial leg: varicose veins   Psychiatric: She has a normal mood and affect. Her behavior is normal. Judgment and thought content normal.              Assessment/Plan:     Brittnee was seen today for annual exam.   1. Healthcare " maintenance/Annual physical exam    - CBC No Differential  - Comprehensive metabolic panel  - Mammo Screening Bilateral With CAD; Future  - Vitamin D 25 hydroxy  - Vitamin B12  - Iron level  - TSH  - T4, free  - flu shot today     2. Varicose veins  To call Dr. Basilio for follow up appointment for repeat procedure. Instructed to consume plenty fluids prior to.             Follow-up:     Return in about 1 year (around 12/10/2019).      Goals:perform proper streching and shoulder exercises   Barriers to goals: compliance with stretching regimen    Health Maintenance   Topic Date Due   • ANNUAL PHYSICAL  02/22/1976   • HEPATITIS A VACCINE ADULT (1 of 2) 02/22/1991   • TDAP/TD VACCINES (1 - Tdap) 02/22/1992   • PAP SMEAR  09/13/2019   • INFLUENZA VACCINE  Completed       Patient does not smoke  does not drink  eat more fruits and vegetables, increase water intake and increase physical activity    RISK SCORE: 3            This document has been electronically signed by Feliberto Rodgers MD on December 17, 2018 10:11 AM      This document has been electronically signed by Feliberto Rodgers MD on December 17, 2018 10:11 AM

## 2019-01-07 ENCOUNTER — TELEPHONE (OUTPATIENT)
Dept: FAMILY MEDICINE CLINIC | Facility: CLINIC | Age: 44
End: 2019-01-07

## 2019-01-07 NOTE — TELEPHONE ENCOUNTER
Pt called and was wondering if her lab results were back in? She would like a call back please.        Thank you,      Naa        All labs were normal except CMP had some abnormal levels. Please review and advise.    Thank you,    JAMIA Ragland MA.    1/7/19

## 2019-01-07 NOTE — TELEPHONE ENCOUNTER
Pt called and was wondering if her lab results were back in? She would like a call back please.      Thank you,      Naa

## 2019-01-09 NOTE — TELEPHONE ENCOUNTER
Please let patient know I have reviewed labs. No adjustments need to be made. Will follow up annually.

## 2019-01-09 NOTE — TELEPHONE ENCOUNTER
Done.   Called and spoke to patients sister-in-law. She said she would relay the info to the patient since she is non-English speaking.     JAMIA Ragland MA.    1/9/19

## 2019-01-10 DIAGNOSIS — Z00.00 HEALTHCARE MAINTENANCE: Primary | ICD-10-CM

## 2019-01-22 ENCOUNTER — PROCEDURE VISIT (OUTPATIENT)
Dept: CARDIOLOGY | Facility: CLINIC | Age: 44
End: 2019-01-22

## 2019-01-22 DIAGNOSIS — I83.813 VARICOSE VEINS OF BOTH LOWER EXTREMITIES WITH PAIN: Primary | ICD-10-CM

## 2019-01-22 PROCEDURE — 36475 ENDOVENOUS RF 1ST VEIN: CPT | Performed by: INTERNAL MEDICINE

## 2019-01-22 PROCEDURE — 36482 ENDOVEN THER CHEM ADHES 1ST: CPT | Performed by: INTERNAL MEDICINE

## 2019-01-22 NOTE — PROGRESS NOTES
VenaSeal Procedure  Great Saphenous Vein  Right Side:    Indication: Mrs. Deb Beaulieu is a 43 yr old  female who was evaluated for long standing varicose veins with pain in both lower extremities and noted to have significant venous reflux in the great saphenous vein on both sides.  Her symptoms did not improve in spite of compression stockings for several years and hence Endovenous ablation of the great saphenous vein was recommended.  The patient underwent the VenaSeal procedure of the GSV on left side in 9/ 2018.  The patient had persistent symptoms on the right side with pain and tingling and hence VenaSeal procedure to the GSV on the right side was recommended.     Procedure Performed: Endovenous Ablation of the Right Great Saphenous Vein with VenaSeal Closure System     Procedure: Duplex ultrasound was used to map out the insufficient vein on the Right side and access was determined and marked on the overlying skin.  The depth and diameter of the vein to be treated was documented.The patient was placed supine on the procedure table and the Right leg prepped and draped using sterile technique.  Ultrasound guidance was again used to localize the access site. 1% Lidocaine was injected as a local anesthetic in the subcutaneous tissue at the target location in the GSV in the leg just at the knee level. Using ultrasound guidance, access was gained at this location with a 19-gauge needle and followed by introduction of a short guidewire, location confirmed with ultrasound. A small, 2-3 mm incision was made at the access site to allow for introduction and placement of the 7 Fr x 11 cm introducer/dilator.  The dilator and guidewire were removed.  The 0.035 guidewire from the Venaseal kit was then introduced and positioned at the saphenofemoral junction using ultrasound guidance.  The 80 cm  7 Sami introducer sheath/dilator was positioned 5 cm from the saphenofemoral junction. The guidewire and dilator were  "removed, and the remaining sheath was flushed with sterile saline, with a syringe remaining in place prior to the next steps.  The cyanoacrylate adhesive was precisely primed into the 5 F delivery catheter and this catheter and this catheter/syringe combination was attached within the dispenser gun. This \"assembly\"was introduced through the 7 F sheath and positioned 5 cms caudal to the saphenofemoral junction under ultrasound guidance. The steps from the IFU were followed for dispensing amounts, locations and compression times, e.g 2 aliquots proximally with 3 minutes of compression, and 1 aliquot every 3 cm distally with 30 sec of compression along the course of the vessel. Following the last injection and compression sequence, the catheter and introducer sheath were pulled out from the access site. Hemostasis was achieved with amaris compression and an adhesive bandage was applied to the incision. Ultrasound confirmed complete coaptation and closure of the treated segments of the left GSV and the absence of any DVT at the saphenofemoral junction.     The length of the vein treated was 33 cm and the amount of cyanoacrylate used was 1 cc.     The drapes were removed and the patient cleaned and prepared for discharge. Post op ultrasound check was scheduled for 72 hrs after the procedure and post procedure instructions were given.     Patient tolerated the procedure well and there were no immediate complications noted.            "

## 2019-03-11 DIAGNOSIS — M25.522 BILATERAL ELBOW JOINT PAIN: Primary | ICD-10-CM

## 2019-03-11 DIAGNOSIS — M25.521 BILATERAL ELBOW JOINT PAIN: Primary | ICD-10-CM

## 2019-03-12 ENCOUNTER — OFFICE VISIT (OUTPATIENT)
Dept: ORTHOPEDIC SURGERY | Facility: CLINIC | Age: 44
End: 2019-03-12

## 2019-03-12 VITALS — HEIGHT: 63 IN | BODY MASS INDEX: 23.57 KG/M2 | WEIGHT: 133 LBS

## 2019-03-12 DIAGNOSIS — M25.521 RIGHT ELBOW PAIN: Primary | ICD-10-CM

## 2019-03-12 DIAGNOSIS — M77.11 LATERAL EPICONDYLITIS OF RIGHT ELBOW: ICD-10-CM

## 2019-03-12 DIAGNOSIS — M25.512 ACUTE PAIN OF LEFT SHOULDER: ICD-10-CM

## 2019-03-12 PROBLEM — M25.522 BILATERAL ELBOW JOINT PAIN: Status: ACTIVE | Noted: 2019-03-12

## 2019-03-12 PROCEDURE — 20605 DRAIN/INJ JOINT/BURSA W/O US: CPT | Performed by: NURSE PRACTITIONER

## 2019-03-12 PROCEDURE — 99214 OFFICE O/P EST MOD 30 MIN: CPT | Performed by: NURSE PRACTITIONER

## 2019-03-12 RX ORDER — MELOXICAM 15 MG/1
15 TABLET ORAL DAILY
Qty: 30 TABLET | Refills: 3 | Status: SHIPPED | OUTPATIENT
Start: 2019-03-12 | End: 2019-04-09

## 2019-03-12 RX ADMIN — LIDOCAINE HYDROCHLORIDE 1 ML: 10 INJECTION, SOLUTION EPIDURAL; INFILTRATION; INTRACAUDAL; PERINEURAL at 09:30

## 2019-03-12 RX ADMIN — TRIAMCINOLONE ACETONIDE 40 MG: 40 INJECTION, SUSPENSION INTRA-ARTICULAR; INTRAMUSCULAR at 09:30

## 2019-03-12 NOTE — PROGRESS NOTES
Brittnee Boyd is a 44 y.o. female   Primary provider:  Feliberto Rodgers MD       Chief Complaint   Patient presents with   • Right Elbow - Pain   • Left Elbow - Pain       HISTORY OF PRESENT ILLNESS:     44-year-old female patient presents to office for evaluation of bilateral elbow pain, with worse pain/symptoms in the right elbow.  Patient is accompanied by her friend/family member who is acting as .  Patient speaks some English but it is not her primary language.  Onset of pain occurred about 2 months ago.  Patient denies any known injury.  Patient works as a  and does a lot of lifting.  Patient was recently evaluated at  Urgent Care on 3/5/2019 and given an IM injection of Kenalog.  Pain is described as constant and mild to moderate in severity.  Pain is described as stabbing and aching in nature.  Pain is worse with palpation and exertional use of her right arm, including lifting and gripping.  Pain improves mildly with rest.  No other treatments have been tried.  Patient also complains of some mild, acute left shoulder pain.  No weakness or limitations in use of the left arm/shoulder.  Patient denies any known injury to the left shoulder.      Pain   This is a new problem. The current episode started more than 1 month ago (About two months ago). The problem occurs intermittently. The problem has been unchanged. Associated symptoms include arthralgias and myalgias. Pertinent negatives include no joint swelling, neck pain, numbness or weakness. Associated symptoms comments: Right elbow pain. Left elbow pain. Left shoulder pain. . The symptoms are aggravated by exertion (raising her arms, exertional use of right arm for lifting, gripping, palpation of right elbow). She has tried rest (topical cream) for the symptoms. The treatment provided mild relief.        CONCURRENT MEDICAL HISTORY:    Past Medical History:   Diagnosis Date   • Abdominal pain, epigastric    • Acute pharyngitis    • Cough    •  Diarrhea of presumed infectious origin    • Dysuria    • Epigastric pain    • Helicobacter positive gastritis    • Irregular menstrual cycle    • Irregular periods    • Onychomycosis    • Pain in right shoulder    • Pain in unspecified upper arm     BILATERAL   • Paronychia of finger    • Polyp of cervix    • Rhinitis    • Right flank pain    • Right lower quadrant pain    • Upper respiratory infection    • Urinary tract infectious disease        No Known Allergies      Current Outpatient Medications:   •  meloxicam (MOBIC) 15 MG tablet, Take 1 tablet by mouth Daily for 30 days., Disp: 30 tablet, Rfl: 3    Past Surgical History:   Procedure Laterality Date   •  SECTION     • ENDOSCOPY  2014    EGD w/ tube 69823 (Normal esophagus. Erosive in stomach. Biopsy taken. Normal duodenum. Biopsy taken.)       Family History   Problem Relation Age of Onset   • Breast cancer Neg Hx    • Ovarian cancer Neg Hx    • Uterine cancer Neg Hx    • Colon cancer Neg Hx         Social History     Socioeconomic History   • Marital status:      Spouse name: Not on file   • Number of children: Not on file   • Years of education: Not on file   • Highest education level: Not on file   Social Needs   • Financial resource strain: Not on file   • Food insecurity - worry: Not on file   • Food insecurity - inability: Not on file   • Transportation needs - medical: Not on file   • Transportation needs - non-medical: Not on file   Occupational History   • Not on file   Tobacco Use   • Smoking status: Never Smoker   • Smokeless tobacco: Never Used   Substance and Sexual Activity   • Alcohol use: No   • Drug use: No   • Sexual activity: Yes     Partners: Male     Birth control/protection: None   Other Topics Concern   • Not on file   Social History Narrative   • Not on file        Review of Systems   Constitutional: Negative.  Negative for activity change.   HENT: Negative.    Eyes: Negative.    Respiratory: Negative.   "  Cardiovascular: Negative.    Gastrointestinal: Negative.    Endocrine: Negative.    Genitourinary: Negative.    Musculoskeletal: Positive for arthralgias and myalgias. Negative for joint swelling and neck pain.        Right elbow pain. Left elbow pain. Left shoulder pain.    Skin: Negative.    Allergic/Immunologic: Negative.    Neurological: Negative for weakness and numbness.   Psychiatric/Behavioral: Negative.        PHYSICAL EXAMINATION:       Ht 160 cm (63\")   Wt 60.3 kg (133 lb)   LMP 02/19/2019   BMI 23.56 kg/m²     Physical Exam   Constitutional: She is oriented to person, place, and time. Vital signs are normal. She appears well-developed and well-nourished. She is active and cooperative. She does not appear ill. No distress.   HENT:   Head: Normocephalic.   Pulmonary/Chest: Effort normal. No respiratory distress.   Abdominal: Soft. She exhibits no distension.   Musculoskeletal: She exhibits tenderness (Right elbow, lateral epidondyle). She exhibits no edema or deformity.   Neurological: She is alert and oriented to person, place, and time. GCS eye subscore is 4. GCS verbal subscore is 5. GCS motor subscore is 6.   Skin: Skin is warm, dry and intact. Capillary refill takes less than 2 seconds. No erythema.   Psychiatric: She has a normal mood and affect. Her speech is normal and behavior is normal. Judgment and thought content normal. Cognition and memory are normal.   Vitals reviewed.      GAIT:     [x]  Normal  []  Antalgic    Assistive device: [x]  None  []  Walker     []  Crutches  []  Cane     []  Wheelchair  []  Stretcher    Right Elbow Exam     Tenderness   The patient is experiencing tenderness in the lateral epicondyle.     Range of Motion   The patient has normal right elbow ROM.    Muscle Strength   The patient has normal right elbow strength.    Tests   Varus: negative  Valgus: negative  Tinel's sign (cubital tunnel): negative    Other   Erythema: absent  Sensation: normal  Pulse: " present    Comments:  Mild pain with range of motion.  No limitations with range of motion.  Tenderness to palpation at the lateral epicondyle.  No swelling.  No erythema.  Stable joint exam.      Left Elbow Exam     Tenderness   The patient is experiencing no tenderness.     Range of Motion   The patient has normal left elbow ROM.    Muscle Strength   The patient has normal left elbow strength.    Tests   Varus: negative  Valgus: negative  Tinel's sign (cubital tunnel): negative    Other   Erythema: absent  Sensation: normal  Pulse: present    Comments:  No pain or limitations with range of motion.  No swelling.  No erythema.  Stable joint exam.      Right Shoulder Exam     Tenderness   The patient is experiencing no tenderness.    Range of Motion   The patient has normal right shoulder ROM.    Muscle Strength   The patient has normal right shoulder strength.    Tests   Lora test: negative  Cross arm: negative  Impingement: negative  Drop arm: negative    Other   Erythema: absent  Sensation: normal  Pulse: present      Left Shoulder Exam     Tenderness   The patient is experiencing no tenderness.     Range of Motion   Active abduction: normal   Passive abduction: normal   Extension: 50   External rotation: normal   Forward flexion: normal   Internal rotation 90 degrees: normal     Muscle Strength   The patient has normal left shoulder strength.    Tests   Lora test: negative  Cross arm: negative  Impingement: negative  Drop arm: negative    Other   Erythema: absent  Sensation: normal  Pulse: present     Comments:  Mild pain with range of motion.  Mild limitations with extension.  No other limitations in range of motion noted.  No weakness noted.  No impingement sign.  No swelling.  No deformity.  No tenderness to palpation.  Stable joint exam.            Xr Chest 2 View    Result Date: 3/5/2019  Narrative: Chest PA and lateral CLINICAL INDICATION: Shortness of breath. Cough COMPARISON: Chest April 26, 2016  FINDINGS: Cardiac silhouette is normal in size. Pulmonary vascularity is unremarkable. No focal infiltrate or consolidation.  No pleural effusion.  No pneumothorax.     Impression: CONCLUSION: No evidence of active disease. Normal chest. Electronically signed by:  Sabas Min MD  3/5/2019 8:51 AM CST Workstation: [id number]    Xr Elbow 3+ View Bilateral    Result Date: 3/12/2019  Narrative: AP, lateral and axial views of the bilateral elbows reveal no evidence of acute fracture or dislocation.  No significant degenerative changes are noted.  Joint spaces are well-maintained.  Soft tissues appear unremarkable.  No acute radiologic abnormalities are noted at this time.  No comparison images are available for review.03/12/19 at 5:05 PM by CASSY Plata     ASSESSMENT:    Diagnoses and all orders for this visit:    Right elbow pain  -     Medium Joint Arthrocentesis: R elbow    Lateral epicondylitis of right elbow  -     Medium Joint Arthrocentesis: R elbow    Acute pain of left shoulder    Other orders  -     meloxicam (MOBIC) 15 MG tablet; Take 1 tablet by mouth Daily for 30 days.        Medium Joint Arthrocentesis: R elbow  Date/Time: 3/12/2019 9:30 AM  Consent given by: patient  Site marked: site marked  Timeout: Immediately prior to procedure a time out was called to verify the correct patient, procedure, equipment, support staff and site/side marked as required   Supporting Documentation  Indications: pain   Procedure Details  Location: elbow - R elbow (Lateral epicondyle)  Preparation: Patient was prepped and draped in the usual sterile fashion  Needle size: 22 G  Approach: anterolateral  Medications administered: 1 mL lidocaine PF 1% 1 %; 40 mg triamcinolone acetonide 40 MG/ML  Patient tolerance: patient tolerated the procedure well with no immediate complications      PLAN    X-rays of the bilateral elbows reviewed with no acute findings noted.  Subjective complaints and physical exam are most consistent  "with lateral epicondylitis to the right elbow.  She does have some pain in the left elbow and left shoulder as well, but no tenderness or concerning signs on exam.  Recommend a localized injection of steroid to the right lateral epicondyle for treatment of pain/inflammation.  Recommend gentle stretching exercises of the wrists and forearms intermittently 2-3 times daily.  Recommend ice therapy to the bilateral elbows and left shoulder as needed to minimize pain/inflammation.  Specifically, we discussed ice massage of the right lateral epicondyle area 3-4 times daily on a consistent basis for the next 1-2 weeks.  We discussed rest and activity modification as tolerated and based on her pain with avoidance of repetitive motions and avoidance of \"palm down\" lifting.  We discussed and I demonstrated \"palm up\" lifting with a shifted focus of using the biceps for lifting and rest of the forearm muscles.  Recommend to start a prescription oral NSAID for consistent dosing and improve control of pain/inflammation.  Meloxicam is prescribed today to take once daily.  Patient is instructed not to take additional oral NSAIDs, such as Ibuprofen or Aleve, while taking Meloxicam.  Follow-up in 4 weeks for recheck.  If her left shoulder pain is not improved after starting Meloxicam, plan for further evaluation of the left shoulder, including x-rays.    Return in about 4 weeks (around 4/9/2019) for Recheck.        This document has been electronically signed by CASSY Plata on March 13, 2019 1:34 PM      CASSY Plata    "

## 2019-03-13 RX ORDER — TRIAMCINOLONE ACETONIDE 40 MG/ML
40 INJECTION, SUSPENSION INTRA-ARTICULAR; INTRAMUSCULAR
Status: COMPLETED | OUTPATIENT
Start: 2019-03-12 | End: 2019-03-12

## 2019-03-13 RX ORDER — LIDOCAINE HYDROCHLORIDE 10 MG/ML
1 INJECTION, SOLUTION EPIDURAL; INFILTRATION; INTRACAUDAL; PERINEURAL
Status: COMPLETED | OUTPATIENT
Start: 2019-03-12 | End: 2019-03-12

## 2019-04-08 DIAGNOSIS — M25.512 ACUTE PAIN OF LEFT SHOULDER: Primary | ICD-10-CM

## 2019-04-09 ENCOUNTER — OFFICE VISIT (OUTPATIENT)
Dept: ORTHOPEDIC SURGERY | Facility: CLINIC | Age: 44
End: 2019-04-09

## 2019-04-09 VITALS — HEIGHT: 63 IN | WEIGHT: 133 LBS | BODY MASS INDEX: 23.57 KG/M2

## 2019-04-09 DIAGNOSIS — M25.522 BILATERAL ELBOW JOINT PAIN: ICD-10-CM

## 2019-04-09 DIAGNOSIS — M25.521 BILATERAL ELBOW JOINT PAIN: ICD-10-CM

## 2019-04-09 DIAGNOSIS — M25.521 RIGHT ELBOW PAIN: ICD-10-CM

## 2019-04-09 DIAGNOSIS — M25.512 ACUTE PAIN OF LEFT SHOULDER: Primary | ICD-10-CM

## 2019-04-09 DIAGNOSIS — M77.11 LATERAL EPICONDYLITIS OF RIGHT ELBOW: ICD-10-CM

## 2019-04-09 PROCEDURE — 20610 DRAIN/INJ JOINT/BURSA W/O US: CPT | Performed by: NURSE PRACTITIONER

## 2019-04-09 PROCEDURE — 99214 OFFICE O/P EST MOD 30 MIN: CPT | Performed by: NURSE PRACTITIONER

## 2019-04-09 RX ADMIN — LIDOCAINE HYDROCHLORIDE 2 ML: 10 INJECTION, SOLUTION EPIDURAL; INFILTRATION; INTRACAUDAL; PERINEURAL at 17:01

## 2019-04-09 RX ADMIN — TRIAMCINOLONE ACETONIDE 40 MG: 40 INJECTION, SUSPENSION INTRA-ARTICULAR; INTRAMUSCULAR at 17:01

## 2019-04-09 NOTE — PROGRESS NOTES
"Brittnee Boyd is a 44 y.o. female returns for     Chief Complaint   Patient presents with   • Right Elbow - Pain   • Left Elbow - Pain   • Left Shoulder - Pain       HISTORY OF PRESENT ILLNESS: Patient presents to office for follow up of right elbow pain and left shoulder pain. A family member is acting as an  as patient speaks limited English. Onset of pain occurred about 3 months ago with no known injury. Patient works as a  and does frequent lifting for her job. Patient was given a localized injection of steroid to the right lateral epicondylitis. Patient reports the injection helped her right elbow pain. Patient complains of continued left shoulder pain. She does not recall any injury. Pain is worse with exertional use of her left arm/shoulder and lifting. Patient was previously taking Meloxicam as prescribed and states it helped the pain but she stopped taking it. Patient states she forgets to take medication.      CONCURRENT MEDICAL HISTORY:    The following portions of the patient's history were reviewed and updated as appropriate: allergies, current medications, past family history, past medical history, past social history, past surgical history and problem list.     ROS  No fevers or chills.  No chest pain or shortness of air.  No GI or  disturbances. Left shoulder pain.     PHYSICAL EXAMINATION:       Ht 160 cm (63\")   Wt 60.3 kg (133 lb)   BMI 23.56 kg/m²     Physical Exam   Constitutional: She is oriented to person, place, and time. Vital signs are normal. She appears well-developed and well-nourished. She is active and cooperative. She does not appear ill. No distress.   HENT:   Head: Normocephalic.   Pulmonary/Chest: Effort normal. No respiratory distress.   Abdominal: Soft. She exhibits no distension.   Musculoskeletal: She exhibits tenderness (Mild, left shoulder). She exhibits no edema or deformity.   Neurological: She is alert and oriented to person, place, and time. GCS eye " subscore is 4. GCS verbal subscore is 5. GCS motor subscore is 6.   Skin: Skin is warm, dry and intact. Capillary refill takes less than 2 seconds. No erythema.   Psychiatric: She has a normal mood and affect. Her speech is normal and behavior is normal. Judgment and thought content normal. Cognition and memory are normal.   Vitals reviewed.      GAIT:     [x]  Normal  []  Antalgic    Assistive device: [x]  None  []  Walker     []  Crutches  []  Cane     []  Wheelchair  []  Stretcher    Right Elbow Exam     Tenderness   The patient is experiencing no tenderness.     Range of Motion   The patient has normal right elbow ROM.    Muscle Strength   The patient has normal right elbow strength.    Tests   Varus: negative  Valgus: negative  Tinel's sign (cubital tunnel): negative    Other   Erythema: absent  Sensation: normal  Pulse: present    Comments:  No pain or limitations with range of motion, improved from prior exam. Mild pain with resistance. No swelling.  No erythema.  Stable joint exam.      Left Elbow Exam     Tenderness   The patient is experiencing no tenderness.     Range of Motion   The patient has normal left elbow ROM.    Muscle Strength   The patient has normal left elbow strength.    Tests   Varus: negative  Valgus: negative  Tinel's sign (cubital tunnel): negative    Other   Erythema: absent  Sensation: normal  Pulse: present    Comments:  No pain or limitations with range of motion.  No swelling.  No erythema.  Stable joint exam.      Right Shoulder Exam     Tenderness   The patient is experiencing no tenderness.    Range of Motion   The patient has normal right shoulder ROM.    Muscle Strength   The patient has normal right shoulder strength.    Tests   Lora test: negative  Cross arm: negative  Impingement: negative  Drop arm: negative    Other   Erythema: absent  Sensation: normal  Pulse: present      Left Shoulder Exam     Tenderness   Left shoulder tenderness location: Mild, diffuse.    Range of  Motion   Active abduction: normal   Passive abduction: normal   Extension: 50   External rotation: normal   Forward flexion: normal   Internal rotation 90 degrees: normal     Muscle Strength   The patient has normal left shoulder strength.    Tests   Lora test: negative  Cross arm: negative  Impingement: negative  Drop arm: negative    Other   Erythema: absent  Sensation: normal  Pulse: present     Comments:  Mild pain with range of motion.  Mild limitations with extension.  No other limitations in range of motion noted.  No weakness noted.  No impingement sign.  No swelling.  No deformity.  No tenderness to palpation.  Stable joint exam.            Xr Shoulder 2+ View Left    Result Date: 4/9/2019  Narrative: 3 views of the left shoulder reveal no evidence of acute fracture or dislocation.  There are mild degenerative changes noted with some loss of glenohumeral joint spacing.  The humeral head is round and fairly well-positioned within the glenoid, appearing in a slightly elevated position.  Soft tissues appear unremarkable.  The visualized lung field is clear.  No acute bony radiologic abnormalities are noted at this time.  No comparison images are available for review.04/09/19 at 5:47 PM by CASSY Plata         ASSESSMENT:    Diagnoses and all orders for this visit:    Acute pain of left shoulder  -     Large Joint Arthrocentesis: L subacromial bursa    Right elbow pain    Lateral epicondylitis of right elbow    Bilateral elbow joint pain      Large Joint Arthrocentesis: L subacromial bursa  Date/Time: 4/9/2019 5:01 PM  Consent given by: patient  Site marked: site marked  Timeout: Immediately prior to procedure a time out was called to verify the correct patient, procedure, equipment, support staff and site/side marked as required   Supporting Documentation  Indications: pain and diagnostic evaluation   Procedure Details  Location: shoulder - L subacromial bursa  Preparation: Patient was prepped and  draped in the usual sterile fashion  Needle size: 22 G  Approach: posterior  Medications administered: 2 mL lidocaine PF 1% 1 %; 40 mg triamcinolone acetonide 40 MG/ML  Patient tolerance: patient tolerated the procedure well with no immediate complications      PLAN    X-rays of left shoulder reviewed with no acute findings noted. Patient complains of left shoulder pain that has been ongoing for about 3 months with no known injury. Recommend subacromial injection of steroid for management of pain/inflammation. Patient has excellent range of motion of the shoulder. She expresses some concern because she cannot fully extend the shoulder behind her like she could before. She has some nonspecific complaints of pain. Recommend to continue Meloxicam as previously prescribed. We discussed taking it daily for now for consistent dosing and improved control of pain/inflammation for both her right elbow and left shoulder. Recommend rest and activity modification as tolerated and based on her pain with avoidance of heavy lifting, pulling and/or tugging.  Recommend ice therapy to the left shoulder intermittently 3 times daily for 15 minutes at a time to minimize pain/inflammation. Continue with ice therapy also to the right elbow as needed. Right elbow pain is improved following steroid injection to the right lateral epicondyle. Continue with avoidance of repetitive motions of the right wrist and forearm. Continue with gentle stretching exercises to the right elbow for lateral epicondylitis. Follow up in 4 weeks for recheck. Consider MRI of left shoulder if pain is not improved. Consider referral to PT if pain is not improved.     Return in about 4 weeks (around 5/7/2019) for Recheck.        This document has been electronically signed by CASSY Plata on April 11, 2019 10:27 PM      CASSY Plata

## 2019-04-11 RX ORDER — TRIAMCINOLONE ACETONIDE 40 MG/ML
40 INJECTION, SUSPENSION INTRA-ARTICULAR; INTRAMUSCULAR
Status: COMPLETED | OUTPATIENT
Start: 2019-04-09 | End: 2019-04-09

## 2019-04-11 RX ORDER — LIDOCAINE HYDROCHLORIDE 10 MG/ML
2 INJECTION, SOLUTION EPIDURAL; INFILTRATION; INTRACAUDAL; PERINEURAL
Status: COMPLETED | OUTPATIENT
Start: 2019-04-09 | End: 2019-04-09

## 2019-06-04 ENCOUNTER — OFFICE VISIT (OUTPATIENT)
Dept: ORTHOPEDIC SURGERY | Facility: CLINIC | Age: 44
End: 2019-06-04

## 2019-06-04 VITALS — BODY MASS INDEX: 24.84 KG/M2 | HEIGHT: 62 IN | WEIGHT: 135 LBS

## 2019-06-04 DIAGNOSIS — M75.102 ROTATOR CUFF SYNDROME OF LEFT SHOULDER: ICD-10-CM

## 2019-06-04 DIAGNOSIS — M77.11 LATERAL EPICONDYLITIS OF RIGHT ELBOW: ICD-10-CM

## 2019-06-04 DIAGNOSIS — M25.521 RIGHT ELBOW PAIN: ICD-10-CM

## 2019-06-04 DIAGNOSIS — M25.512 CHRONIC LEFT SHOULDER PAIN: Primary | ICD-10-CM

## 2019-06-04 DIAGNOSIS — G89.29 CHRONIC LEFT SHOULDER PAIN: Primary | ICD-10-CM

## 2019-06-04 PROCEDURE — 99214 OFFICE O/P EST MOD 30 MIN: CPT | Performed by: NURSE PRACTITIONER

## 2019-06-04 RX ORDER — NABUMETONE 500 MG/1
500 TABLET, FILM COATED ORAL 2 TIMES DAILY
Qty: 90 TABLET | Refills: 1 | Status: SHIPPED | OUTPATIENT
Start: 2019-06-04 | End: 2019-06-19

## 2019-06-04 NOTE — PROGRESS NOTES
"Brittnee Boyd is a 44 y.o. female returns for     Chief Complaint   Patient presents with   • Left Shoulder - Follow-up       HISTORY OF PRESENT ILLNESS: Patient presents to office for follow-up of chronic left shoulder pain.  Patient's son is acting as an  as the patient primarily speaks Mandarin Chinese.  Patient continues to complain of ongoing left shoulder pain.  This started approximately 6 months ago with no known injury.  Patient works as a  and does frequent lifting for her job.  Patient was given a subacromial injection of steroid on 4/9/2019, which she states relieved her pain for about oine month and then the pain returned.  Patient was also taking Meloxicam for about one month and reports only minimal improvement.  Patient also complains of continued right elbow pain and was previously diagnosed with lateral epicondylitis.  Her current right elbow symptoms are mild.  No new complaints or concerns noted.     CONCURRENT MEDICAL HISTORY:    The following portions of the patient's history were reviewed and updated as appropriate: allergies, current medications, past family history, past medical history, past social history, past surgical history and problem list.     ROS  No fevers or chills.  No chest pain or shortness of air.  No GI or  disturbances. Left shoulder pain. Right elbow pain.     PHYSICAL EXAMINATION:       Ht 157.5 cm (62\")   Wt 61.2 kg (135 lb)   BMI 24.69 kg/m²     Physical Exam   Constitutional: She is oriented to person, place, and time. Vital signs are normal. She appears well-developed and well-nourished. She is active and cooperative. She does not appear ill. No distress.   HENT:   Head: Normocephalic.   Pulmonary/Chest: Effort normal. No respiratory distress.   Abdominal: Soft. She exhibits no distension.   Musculoskeletal: She exhibits tenderness (Mild, left shoulder, right elbow). She exhibits no edema or deformity.   Neurological: She is alert and oriented to " person, place, and time. GCS eye subscore is 4. GCS verbal subscore is 5. GCS motor subscore is 6.   Skin: Skin is warm, dry and intact. Capillary refill takes less than 2 seconds. No erythema.   Psychiatric: She has a normal mood and affect. Her speech is normal and behavior is normal. Judgment and thought content normal. Cognition and memory are normal.   Vitals reviewed.      GAIT:     [x]  Normal  []  Antalgic    Assistive device: [x]  None  []  Walker     []  Crutches  []  Cane     []  Wheelchair  []  Stretcher    Right Elbow Exam     Tenderness   The patient is experiencing tenderness in the lateral epicondyle (Mild).     Range of Motion   The patient has normal right elbow ROM.    Muscle Strength   The patient has normal right elbow strength.    Tests   Varus: negative  Valgus: negative  Tinel's sign (cubital tunnel): negative    Other   Erythema: absent  Sensation: normal  Pulse: present    Comments:  No limitations with range of motion.  Mild pain with range of motion, increases with resistance. Mild tenderness to palpation at the lateral epicondyle.  No swelling.  No erythema.  Stable joint exam.      Left Elbow Exam     Tenderness   The patient is experiencing no tenderness.     Range of Motion   The patient has normal left elbow ROM.    Muscle Strength   The patient has normal left elbow strength.    Tests   Varus: negative  Valgus: negative  Tinel's sign (cubital tunnel): negative    Other   Erythema: absent  Sensation: normal  Pulse: present    Comments:  No pain or limitations with range of motion.  No swelling.  No erythema.  Stable joint exam.      Right Shoulder Exam     Tenderness   The patient is experiencing no tenderness.    Range of Motion   The patient has normal right shoulder ROM.    Muscle Strength   The patient has normal right shoulder strength.    Tests   Lora test: negative  Cross arm: negative  Impingement: negative  Drop arm: negative    Other   Erythema: absent  Sensation:  normal  Pulse: present      Left Shoulder Exam     Tenderness   Left shoulder tenderness location: Mild, diffuse.    Range of Motion   Active abduction: normal   Passive abduction: normal   Extension: 50   External rotation: normal   Forward flexion: normal   Internal rotation 90 degrees: 80     Muscle Strength   The patient has normal left shoulder strength.    Tests   Lora test: positive (Mild)  Cross arm: negative  Impingement: positive (Mild)  Drop arm: negative    Other   Erythema: absent  Sensation: normal  Pulse: present     Comments:  Mild pain with range of motion.  Mild limitations with extension.  No other limitations in range of motion noted.  No weakness noted.  Mild impingement sign.  No swelling.  No deformity.  No tenderness to palpation.  Stable joint exam.              Xr Shoulder 2+ View Left     Result Date: 4/9/2019  Narrative: 3 views of the left shoulder reveal no evidence of acute fracture or dislocation.  There are mild degenerative changes noted with some loss of glenohumeral joint spacing.  The humeral head is round and fairly well-positioned within the glenoid, appearing in a slightly elevated position.  Soft tissues appear unremarkable.  The visualized lung field is clear.  No acute bony radiologic abnormalities are noted at this time.  No comparison images are available for review.04/09/19 at 5:47 PM by CASSY Plata       ASSESSMENT:    Diagnoses and all orders for this visit:    Chronic left shoulder pain  -     MRI Shoulder Left Without Contrast; Future    Lateral epicondylitis of right elbow    Right elbow pain    Rotator cuff syndrome of left shoulder  -     MRI Shoulder Left Without Contrast; Future    Other orders  -     nabumetone (RELAFEN) 500 MG tablet; Take 1 tablet by mouth 2 (Two) Times a Day for 15 days.    PLAN    Patient continues to complain of ongoing left shoulder pain.  The previous subacromial injection given did relieve her pain but only for about 1 month  "and then the pain began to return.  Patient complains of increased pain with exertional use of her left arm/shoulder.  She has no weakness or significant loss of motion on exam.  We discussed various differential diagnoses including bursitis, tendinitis and/or rotator cuff injury.  Based on her repetitive motions and frequent lifting at her job, I am more suspicious of an inflammatory condition such as bursitis or tendinitis at this point.  Patient has also tried prescription oral NSAIDs with minimal improvement.  Recommend MRI of left shoulder to evaluate for bursitis, tendinitis, impingement and/or rotator cuff injury.  Recommend rest and activity modification as tolerated and based on her pain with avoidance of repetitive motions, heavy lifting, pulling and/or tugging.  Recommend trying a different oral NSAID to see if this improves her pain better.  Relafen is prescribed today to take twice daily.  Patient is instructed to take the medication with food to minimize GI upset.  Patient is instructed not to take additional oral NSAIDs, such as ibuprofen or Aleve, while taking Relafen.  Patient also continues to complain of right elbow pain.  Currently, her pain and symptoms are mild.  We again discussed activity modification and avoidance of \"palm down\" lifting and repetitive, exertional use of her right wrist and forearm.  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  We discussed alternative methods for her work that will allow for rest of the forearm muscles and tendons.  Patient may benefit from a referral to physical therapy for both her left shoulder and right elbow.  Recommend continue with ice therapy to the right elbow and left shoulder as needed to minimize pain/inflammation.  Follow-up after MRI.    Return for follow up after MRI.        This document has been electronically signed by CASSY Plata on June 6, 2019 12:27 PM  "     Graciela Roa, APRN

## 2019-06-06 PROBLEM — M75.102 ROTATOR CUFF SYNDROME OF LEFT SHOULDER: Status: ACTIVE | Noted: 2019-06-06

## 2019-06-18 ENCOUNTER — HOSPITAL ENCOUNTER (OUTPATIENT)
Dept: MRI IMAGING | Facility: HOSPITAL | Age: 44
Discharge: HOME | End: 2019-06-18
Admitting: NURSE PRACTITIONER

## 2019-06-18 DIAGNOSIS — M75.102 ROTATOR CUFF SYNDROME OF LEFT SHOULDER: ICD-10-CM

## 2019-06-18 DIAGNOSIS — G89.29 CHRONIC LEFT SHOULDER PAIN: ICD-10-CM

## 2019-06-18 DIAGNOSIS — M25.512 CHRONIC LEFT SHOULDER PAIN: ICD-10-CM

## 2019-06-18 PROCEDURE — 73221 MRI JOINT UPR EXTREM W/O DYE: CPT

## 2019-12-11 ENCOUNTER — OFFICE VISIT (OUTPATIENT)
Dept: FAMILY MEDICINE CLINIC | Facility: CLINIC | Age: 44
End: 2019-12-11

## 2019-12-11 VITALS
BODY MASS INDEX: 25.4 KG/M2 | WEIGHT: 138 LBS | DIASTOLIC BLOOD PRESSURE: 70 MMHG | OXYGEN SATURATION: 98 % | TEMPERATURE: 97.2 F | HEIGHT: 62 IN | HEART RATE: 75 BPM | SYSTOLIC BLOOD PRESSURE: 110 MMHG

## 2019-12-11 DIAGNOSIS — Z00.00 ENCOUNTER FOR ANNUAL PHYSICAL EXAM: Primary | ICD-10-CM

## 2019-12-11 PROCEDURE — 99396 PREV VISIT EST AGE 40-64: CPT | Performed by: STUDENT IN AN ORGANIZED HEALTH CARE EDUCATION/TRAINING PROGRAM

## 2019-12-11 PROCEDURE — 90686 IIV4 VACC NO PRSV 0.5 ML IM: CPT | Performed by: STUDENT IN AN ORGANIZED HEALTH CARE EDUCATION/TRAINING PROGRAM

## 2019-12-11 PROCEDURE — 90471 IMMUNIZATION ADMIN: CPT | Performed by: STUDENT IN AN ORGANIZED HEALTH CARE EDUCATION/TRAINING PROGRAM

## 2019-12-11 RX ORDER — LIDOCAINE 50 MG/G
1 PATCH TOPICAL EVERY 24 HOURS
Qty: 30 EACH | Refills: 0 | OUTPATIENT
Start: 2019-12-11 | End: 2019-12-31

## 2019-12-11 NOTE — PROGRESS NOTES
I have seen the patient.  I have reviewed the notes, assessments, and/or procedures performed by Dr. Rodgers, I concur with her/his documentation and assessment and plan for Brittnee Boyd.       This document has been electronically signed by Sg Andrea MD on December 11, 2019 5:34 PM

## 2019-12-11 NOTE — PROGRESS NOTES
ID: Brittnee Boyd    CC:   Chief Complaint   Patient presents with   • Annual Exam     Pt her for annual and wants elbow looked at.       Subjective:     HPI     Brittnee Boyd is a 44 y.o. female who presents for annual exam.   Patient is here with her sister-in-law who will be translating. Patient speaks chinese mandarin.  was provided but patient wanted her sister in law.     Annual exam  Health maintenance     Patient is due for her mammogram on 2020. Previous exam showed no abnormalities.  Pap smear was completed  without HPV testing. Due this year.   She would also like the flu vaccine today.  Previous labs did show mild elevation in LFT's but were trending down. Will get repeat labs today.   Patient states she gets her annual teeth cleaning.   Vision screen was w/n/l.   Patient denies any chest pain, sob, fever, fatigue.   Patient does not smoke or use any illicit substances or drink alcohol.   Preventative:  Over the past 2 weeks, have you felt down, depressed, or hopeless?No   Over the past 2 weeks, have you felt little interest or pleasure in doing things?No  Clinical depression screening refused by patient.No     On osteoporosis therapy?No     Past Medical Hx:  Past Medical History:   Diagnosis Date   • Abdominal pain, epigastric    • Acute pharyngitis    • Cough    • Diarrhea of presumed infectious origin    • Dysuria    • Epigastric pain    • Helicobacter positive gastritis    • Irregular menstrual cycle    • Irregular periods    • Onychomycosis    • Pain in right shoulder    • Pain in unspecified upper arm     BILATERAL   • Paronychia of finger    • Polyp of cervix    • Rhinitis    • Right flank pain    • Right lower quadrant pain    • Upper respiratory infection    • Urinary tract infectious disease        Past Surgical Hx:  Past Surgical History:   Procedure Laterality Date   •  SECTION     • ENDOSCOPY  2014    EGD w/ tube 25545 (Normal esophagus. Erosive in stomach. Biopsy  taken. Normal duodenum. Biopsy taken.)       Health Maintenance:  Health Maintenance   Topic Date Due   • TDAP/TD VACCINES (1 - Tdap) 02/22/1984   • INFLUENZA VACCINE  08/01/2019   • PAP SMEAR  09/13/2019   • ANNUAL PHYSICAL  12/12/2020       Current Meds:    Current Outpatient Medications:   •  lidocaine (LIDODERM) 5 %, Place 1 patch on the skin as directed by provider Daily. Remove & Discard patch within 12 hours or as directed by MD, Disp: 30 each, Rfl: 0    Allergies:  Patient has no known allergies.    Family Hx:  Family History   Problem Relation Age of Onset   • Breast cancer Neg Hx    • Ovarian cancer Neg Hx    • Uterine cancer Neg Hx    • Colon cancer Neg Hx         Social History:  Social History     Socioeconomic History   • Marital status:      Spouse name: Not on file   • Number of children: Not on file   • Years of education: Not on file   • Highest education level: Not on file   Tobacco Use   • Smoking status: Never Smoker   • Smokeless tobacco: Never Used   Substance and Sexual Activity   • Alcohol use: No   • Drug use: No   • Sexual activity: Yes     Partners: Male     Birth control/protection: None       Review of Systems   Constitutional: Negative for activity change, appetite change, chills and fatigue.   HENT: Negative.  Negative for congestion.    Eyes: Negative.    Respiratory: Negative.  Negative for shortness of breath.    Cardiovascular: Negative for chest pain, palpitations and leg swelling.   Gastrointestinal: Negative.  Negative for abdominal distention, abdominal pain, constipation, nausea and vomiting.   Endocrine: Negative.  Negative for polyphagia and polyuria.   Genitourinary: Negative for difficulty urinating, flank pain and menstrual problem.   Musculoskeletal: Negative for arthralgias, myalgias and neck stiffness.   Skin: Negative.    Allergic/Immunologic: Negative.    Neurological: Negative.  Negative for dizziness and headaches.   Hematological: Negative.   "  Psychiatric/Behavioral: Negative.  Negative for agitation and behavioral problems.           Objective:     /70   Pulse 75   Temp 97.2 °F (36.2 °C) (Temporal)   Ht 157.5 cm (62\")   Wt 62.6 kg (138 lb)   SpO2 98%   BMI 25.24 kg/m²     Physical Exam   Constitutional: She is oriented to person, place, and time. She appears well-developed and well-nourished.   HENT:   Head: Normocephalic and atraumatic.   Right Ear: External ear normal.   Left Ear: External ear normal.   Nose: Nose normal.   Mouth/Throat: Oropharynx is clear and moist.   Eyes: Pupils are equal, round, and reactive to light. Conjunctivae and EOM are normal.   Neck: Normal range of motion. Neck supple. No JVD present. No tracheal deviation present. No thyromegaly present.   Cardiovascular: Normal rate, regular rhythm, normal heart sounds and intact distal pulses. Exam reveals no gallop and no friction rub.   No murmur heard.  Pulmonary/Chest: Effort normal and breath sounds normal. She has no wheezes.   Abdominal: Soft. Bowel sounds are normal. She exhibits no distension. There is no tenderness.   Musculoskeletal: Normal range of motion. She exhibits no tenderness.   Neurological: She is alert and oriented to person, place, and time. She has normal reflexes.   Skin: Skin is warm and dry.   Right medial leg: varicose veins   Psychiatric: She has a normal mood and affect. Her behavior is normal. Judgment and thought content normal.              Assessment/Plan:     Brittnee was seen today for annual exam.   1. Healthcare maintenance/Annual physical exam    - CBC No Differential  - Comprehensive metabolic panel: will monitor LFT's  - Mammo Screening Bilateral With CAD; Future  - Pap smear: will schedule for next week on Tuesday with myself.   - Vitamin D 25 hydroxy  - Vitamin B12  - Iron level  - TSH  - flu shot today               Follow-up:     Return in about 1 week (around 12/18/2019) for pap smear.      Goals:perform proper streching and " shoulder exercises   Barriers to goals: compliance with stretching regimen    Health Maintenance   Topic Date Due   • TDAP/TD VACCINES (1 - Tdap) 02/22/1984   • INFLUENZA VACCINE  08/01/2019   • PAP SMEAR  09/13/2019   • ANNUAL PHYSICAL  12/12/2020       Patient does not smoke  does not drink  eat more fruits and vegetables, increase water intake and increase physical activity    RISK SCORE: 3            This document has been electronically signed by Feliberto Rodgers MD on December 11, 2019 1:23 PM      This document has been electronically signed by Feliberto Rodgers MD on December 11, 2019 1:23 PM

## 2019-12-12 ENCOUNTER — LAB (OUTPATIENT)
Dept: LAB | Facility: HOSPITAL | Age: 44
End: 2019-12-12

## 2019-12-12 DIAGNOSIS — Z00.00 ENCOUNTER FOR ANNUAL PHYSICAL EXAM: ICD-10-CM

## 2019-12-12 LAB
25(OH)D3 SERPL-MCNC: 21 NG/ML (ref 30–100)
ALBUMIN SERPL-MCNC: 4.1 G/DL (ref 3.5–5.2)
ALBUMIN/GLOB SERPL: 1.3 G/DL
ALP SERPL-CCNC: 48 U/L (ref 39–117)
ALT SERPL W P-5'-P-CCNC: 33 U/L (ref 1–33)
ANION GAP SERPL CALCULATED.3IONS-SCNC: 8 MMOL/L (ref 5–15)
AST SERPL-CCNC: 20 U/L (ref 1–32)
BILIRUB SERPL-MCNC: 0.5 MG/DL (ref 0.2–1.2)
BUN BLD-MCNC: 14 MG/DL (ref 6–20)
BUN/CREAT SERPL: 26.4 (ref 7–25)
CALCIUM SPEC-SCNC: 8.5 MG/DL (ref 8.6–10.5)
CHLORIDE SERPL-SCNC: 102 MMOL/L (ref 98–107)
CHOLEST SERPL-MCNC: 149 MG/DL (ref 0–200)
CO2 SERPL-SCNC: 29 MMOL/L (ref 22–29)
CREAT BLD-MCNC: 0.53 MG/DL (ref 0.57–1)
DEPRECATED RDW RBC AUTO: 39 FL (ref 37–54)
ERYTHROCYTE [DISTWIDTH] IN BLOOD BY AUTOMATED COUNT: 11.5 % (ref 12.3–15.4)
GFR SERPL CREATININE-BSD FRML MDRD: 124 ML/MIN/1.73
GFR SERPL CREATININE-BSD FRML MDRD: >150 ML/MIN/1.73
GLOBULIN UR ELPH-MCNC: 3.1 GM/DL
GLUCOSE BLD-MCNC: 90 MG/DL (ref 65–99)
HCT VFR BLD AUTO: 38.6 % (ref 34–46.6)
HDLC SERPL-MCNC: 61 MG/DL (ref 40–60)
HGB BLD-MCNC: 13 G/DL (ref 12–15.9)
LDLC SERPL CALC-MCNC: 74 MG/DL (ref 0–100)
LDLC/HDLC SERPL: 1.22 {RATIO}
MCH RBC QN AUTO: 31.3 PG (ref 26.6–33)
MCHC RBC AUTO-ENTMCNC: 33.7 G/DL (ref 31.5–35.7)
MCV RBC AUTO: 93 FL (ref 79–97)
PLATELET # BLD AUTO: 280 10*3/MM3 (ref 140–450)
PMV BLD AUTO: 10 FL (ref 6–12)
POTASSIUM BLD-SCNC: 3.9 MMOL/L (ref 3.5–5.2)
PROT SERPL-MCNC: 7.2 G/DL (ref 6–8.5)
RBC # BLD AUTO: 4.15 10*6/MM3 (ref 3.77–5.28)
SODIUM BLD-SCNC: 139 MMOL/L (ref 136–145)
TRIGL SERPL-MCNC: 69 MG/DL (ref 0–150)
VIT B12 BLD-MCNC: 608 PG/ML (ref 211–946)
VLDLC SERPL-MCNC: 13.8 MG/DL (ref 5–40)
WBC NRBC COR # BLD: 4.56 10*3/MM3 (ref 3.4–10.8)

## 2019-12-12 PROCEDURE — 80053 COMPREHEN METABOLIC PANEL: CPT

## 2019-12-12 PROCEDURE — 80061 LIPID PANEL: CPT

## 2019-12-12 PROCEDURE — 82306 VITAMIN D 25 HYDROXY: CPT

## 2019-12-12 PROCEDURE — 82607 VITAMIN B-12: CPT

## 2019-12-12 PROCEDURE — 85027 COMPLETE CBC AUTOMATED: CPT

## 2019-12-12 PROCEDURE — 36415 COLL VENOUS BLD VENIPUNCTURE: CPT

## 2019-12-31 ENCOUNTER — TRANSCRIBE ORDERS (OUTPATIENT)
Dept: URGENT CARE | Facility: CLINIC | Age: 44
End: 2019-12-31

## 2019-12-31 DIAGNOSIS — K64.9 HEMORRHOIDS WITHOUT COMPLICATION: Primary | ICD-10-CM

## 2020-01-14 ENCOUNTER — OFFICE VISIT (OUTPATIENT)
Dept: GASTROENTEROLOGY | Facility: CLINIC | Age: 45
End: 2020-01-14

## 2020-01-14 VITALS
WEIGHT: 140.2 LBS | SYSTOLIC BLOOD PRESSURE: 97 MMHG | BODY MASS INDEX: 25.8 KG/M2 | HEIGHT: 62 IN | HEART RATE: 76 BPM | DIASTOLIC BLOOD PRESSURE: 62 MMHG

## 2020-01-14 DIAGNOSIS — K62.89 ANAL OR RECTAL PAIN: ICD-10-CM

## 2020-01-14 DIAGNOSIS — R10.31 RIGHT LOWER QUADRANT ABDOMINAL PAIN: Primary | ICD-10-CM

## 2020-01-14 DIAGNOSIS — R19.4 CHANGE IN BOWEL HABITS: ICD-10-CM

## 2020-01-14 PROCEDURE — 99214 OFFICE O/P EST MOD 30 MIN: CPT | Performed by: NURSE PRACTITIONER

## 2020-01-14 RX ORDER — SODIUM CHLORIDE 0.9 % (FLUSH) 0.9 %
3 SYRINGE (ML) INJECTION EVERY 12 HOURS SCHEDULED
Status: CANCELLED | OUTPATIENT
Start: 2020-01-21

## 2020-01-14 RX ORDER — SODIUM, POTASSIUM,MAG SULFATES 17.5-3.13G
1 SOLUTION, RECONSTITUTED, ORAL ORAL EVERY 12 HOURS
Qty: 2 BOTTLE | Refills: 0 | Status: SHIPPED | OUTPATIENT
Start: 2020-01-14 | End: 2020-01-30

## 2020-01-14 RX ORDER — DEXTROSE AND SODIUM CHLORIDE 5; .45 G/100ML; G/100ML
30 INJECTION, SOLUTION INTRAVENOUS CONTINUOUS PRN
Status: CANCELLED | OUTPATIENT
Start: 2020-01-21

## 2020-01-14 RX ORDER — SODIUM CHLORIDE 0.9 % (FLUSH) 0.9 %
10 SYRINGE (ML) INJECTION AS NEEDED
Status: CANCELLED | OUTPATIENT
Start: 2020-01-21

## 2020-01-14 NOTE — H&P (VIEW-ONLY)
Chief Complaint   Patient presents with   • Constipation   • Diarrhea       Subjective    Brittnee Boyd is a 44 y.o. female. she is here today for follow-up.    History of Present Illness  44-year-old female presents to discuss abdominal pain.  She reports she has frequent abdominal pain 2-3 times per day typically in the morning followed by loose bowel movement.  States she knows she has a hemorrhoid which has become very painful recently she was evaluated in urgent care and rectal exam there noted external hemorrhoid she was treated with hydrocortisone cream which has not improved symptoms at all.  States that she only has bowel movements in the morning she typically has urgency to have a bowel movement throughout the day she denies any rectal pain.  States symptoms been ongoing for the last 1 to 2 months she denies issues with constipation.  States stool is about finger size and does not feel like she is adequately emptying.  She was previously seen in this office in  however I am unable to review notes at this time.  Visit was conducted with use of [employer] translation services patient speaks Mandarin Chinese and does not understand English well.       The following portions of the patient's history were reviewed and updated as appropriate:   Past Medical History:   Diagnosis Date   • Abdominal pain, epigastric    • Acute pharyngitis    • Cough    • Diarrhea of presumed infectious origin    • Dysuria    • Epigastric pain    • Helicobacter positive gastritis    • Irregular menstrual cycle    • Irregular periods    • Onychomycosis    • Pain in right shoulder    • Pain in unspecified upper arm     BILATERAL   • Paronychia of finger    • Polyp of cervix    • Rhinitis    • Right flank pain    • Right lower quadrant pain    • Upper respiratory infection    • Urinary tract infectious disease      Past Surgical History:   Procedure Laterality Date   •  SECTION     • ENDOSCOPY  2014    EGD w/ tube 08533  (Normal esophagus. Erosive in stomach. Biopsy taken. Normal duodenum. Biopsy taken.)     Family History   Problem Relation Age of Onset   • Breast cancer Neg Hx    • Ovarian cancer Neg Hx    • Uterine cancer Neg Hx    • Colon cancer Neg Hx      OB History        2    Para   2    Term   2            AB        Living   2       SAB        TAB        Ectopic        Molar        Multiple        Live Births                  Prior to Admission medications    Medication Sig Start Date End Date Taking? Authorizing Provider   hydrocortisone (ANUSOL-HC) 2.5 % rectal cream Apply to rectal area BID. May apply after bowel movement. 19   Kathy Recio APRN   sodium-potassium-magnesium sulfates (SUPREP BOWEL PREP KIT) 17.5-3.13-1.6 GM/177ML solution oral solution Take 1 bottle by mouth Every 12 (Twelve) Hours. 20   Radha Mcdonald APRN     No Known Allergies  Social History     Socioeconomic History   • Marital status:      Spouse name: Not on file   • Number of children: Not on file   • Years of education: Not on file   • Highest education level: Not on file   Tobacco Use   • Smoking status: Never Smoker   • Smokeless tobacco: Never Used   Substance and Sexual Activity   • Alcohol use: No   • Drug use: No   • Sexual activity: Yes     Partners: Male     Birth control/protection: None       Review of Systems  Review of Systems   Constitutional: Positive for fatigue. Negative for activity change, appetite change, chills, diaphoresis, fever and unexpected weight change.   HENT: Negative for sore throat and trouble swallowing.    Respiratory: Negative for shortness of breath.    Gastrointestinal: Positive for abdominal pain. Negative for abdominal distention, anal bleeding, blood in stool, constipation, diarrhea, nausea, rectal pain and vomiting.        Change in bowel habits    Musculoskeletal: Negative for arthralgias.   Skin: Negative for pallor.   Neurological: Negative for light-headedness.       "       BP 97/62 (BP Location: Left arm)   Pulse 76   Ht 157.5 cm (62\")   Wt 63.6 kg (140 lb 3.2 oz)   BMI 25.64 kg/m²     Objective    Physical Exam   Constitutional: She is oriented to person, place, and time. She appears well-developed and well-nourished. She is cooperative. No distress.   HENT:   Head: Normocephalic and atraumatic.   Neck: Normal range of motion. Neck supple. No thyromegaly present.   Cardiovascular: Normal rate, regular rhythm and normal heart sounds.   Pulmonary/Chest: Effort normal and breath sounds normal. She has no wheezes. She has no rhonchi. She has no rales.   Abdominal: Soft. Normal appearance and bowel sounds are normal. She exhibits no distension. There is no hepatosplenomegaly. There is tenderness in the right lower quadrant. There is no rigidity and no guarding. No hernia.   Lymphadenopathy:     She has no cervical adenopathy.   Neurological: She is alert and oriented to person, place, and time.   Skin: Skin is warm, dry and intact. No rash noted. No pallor.   Psychiatric: She has a normal mood and affect. Her speech is normal.     Lab on 12/12/2019   Component Date Value Ref Range Status   • Glucose 12/12/2019 90  65 - 99 mg/dL Final   • BUN 12/12/2019 14  6 - 20 mg/dL Final   • Creatinine 12/12/2019 0.53* 0.57 - 1.00 mg/dL Final   • Sodium 12/12/2019 139  136 - 145 mmol/L Final   • Potassium 12/12/2019 3.9  3.5 - 5.2 mmol/L Final   • Chloride 12/12/2019 102  98 - 107 mmol/L Final   • CO2 12/12/2019 29.0  22.0 - 29.0 mmol/L Final   • Calcium 12/12/2019 8.5* 8.6 - 10.5 mg/dL Final   • Total Protein 12/12/2019 7.2  6.0 - 8.5 g/dL Final   • Albumin 12/12/2019 4.10  3.50 - 5.20 g/dL Final   • ALT (SGPT) 12/12/2019 33  1 - 33 U/L Final   • AST (SGOT) 12/12/2019 20  1 - 32 U/L Final   • Alkaline Phosphatase 12/12/2019 48  39 - 117 U/L Final   • Total Bilirubin 12/12/2019 0.5  0.2 - 1.2 mg/dL Final   • eGFR Non African Amer 12/12/2019 124  >60 mL/min/1.73 Final   • eGFR  African Amer " 12/12/2019 >150  >60 mL/min/1.73 Final   • Globulin 12/12/2019 3.1  gm/dL Final   • A/G Ratio 12/12/2019 1.3  g/dL Final   • BUN/Creatinine Ratio 12/12/2019 26.4* 7.0 - 25.0 Final   • Anion Gap 12/12/2019 8.0  5.0 - 15.0 mmol/L Final   • WBC 12/12/2019 4.56  3.40 - 10.80 10*3/mm3 Final   • RBC 12/12/2019 4.15  3.77 - 5.28 10*6/mm3 Final   • Hemoglobin 12/12/2019 13.0  12.0 - 15.9 g/dL Final   • Hematocrit 12/12/2019 38.6  34.0 - 46.6 % Final   • MCV 12/12/2019 93.0  79.0 - 97.0 fL Final   • MCH 12/12/2019 31.3  26.6 - 33.0 pg Final   • MCHC 12/12/2019 33.7  31.5 - 35.7 g/dL Final   • RDW 12/12/2019 11.5* 12.3 - 15.4 % Final   • RDW-SD 12/12/2019 39.0  37.0 - 54.0 fl Final   • MPV 12/12/2019 10.0  6.0 - 12.0 fL Final   • Platelets 12/12/2019 280  140 - 450 10*3/mm3 Final   • 25 Hydroxy, Vitamin D 12/12/2019 21.0* 30.0 - 100.0 ng/ml Final   • Vitamin B-12 12/12/2019 608  211 - 946 pg/mL Final   • Total Cholesterol 12/12/2019 149  0 - 200 mg/dL Final   • Triglycerides 12/12/2019 69  0 - 150 mg/dL Final   • HDL Cholesterol 12/12/2019 61* 40 - 60 mg/dL Final   • LDL Cholesterol  12/12/2019 74  0 - 100 mg/dL Final   • VLDL Cholesterol 12/12/2019 13.8  5 - 40 mg/dL Final   • LDL/HDL Ratio 12/12/2019 1.22   Final     Assessment/Plan      1. Right lower quadrant abdominal pain    2. Change in bowel habits    3. Anal or rectal pain    .       Orders placed during this encounter include:  Orders Placed This Encounter   Procedures   • Follow Anesthesia Guidelines / Standing Orders     Standing Status:   Future   • Obtain Informed Consent     Standing Status:   Future     Order Specific Question:   Informed Consent Given For     Answer:   COLONOSCOPY       COLONOSCOPY (N/A)    Review and/or summary of lab tests, radiology, procedures, medications. Review and summary of old records and obtaining of history. The risks and benefits of my recommendations, as well as other treatment options were discussed with the patient today.  Questions were answered.    New Medications Ordered This Visit   Medications   • sodium-potassium-magnesium sulfates (SUPREP BOWEL PREP KIT) 17.5-3.13-1.6 GM/177ML solution oral solution     Sig: Take 1 bottle by mouth Every 12 (Twelve) Hours.     Dispense:  2 bottle     Refill:  0       Follow-up: Return in about 4 weeks (around 2/11/2020) for After test.          This document has been electronically signed by CASSY Long on January 17, 2020 11:41 AM             Results for orders placed or performed in visit on 12/12/19   Vitamin D 25 hydroxy   Result Value Ref Range    25 Hydroxy, Vitamin D 21.0 (L) 30.0 - 100.0 ng/ml   CBC No Differential   Result Value Ref Range    WBC 4.56 3.40 - 10.80 10*3/mm3    RBC 4.15 3.77 - 5.28 10*6/mm3    Hemoglobin 13.0 12.0 - 15.9 g/dL    Hematocrit 38.6 34.0 - 46.6 %    MCV 93.0 79.0 - 97.0 fL    MCH 31.3 26.6 - 33.0 pg    MCHC 33.7 31.5 - 35.7 g/dL    RDW 11.5 (L) 12.3 - 15.4 %    RDW-SD 39.0 37.0 - 54.0 fl    MPV 10.0 6.0 - 12.0 fL    Platelets 280 140 - 450 10*3/mm3   Vitamin B12   Result Value Ref Range    Vitamin B-12 608 211 - 946 pg/mL   Lipid panel   Result Value Ref Range    Total Cholesterol 149 0 - 200 mg/dL    Triglycerides 69 0 - 150 mg/dL    HDL Cholesterol 61 (H) 40 - 60 mg/dL    LDL Cholesterol  74 0 - 100 mg/dL    VLDL Cholesterol 13.8 5 - 40 mg/dL    LDL/HDL Ratio 1.22    Comprehensive metabolic panel   Result Value Ref Range    Glucose 90 65 - 99 mg/dL    BUN 14 6 - 20 mg/dL    Creatinine 0.53 (L) 0.57 - 1.00 mg/dL    Sodium 139 136 - 145 mmol/L    Potassium 3.9 3.5 - 5.2 mmol/L    Chloride 102 98 - 107 mmol/L    CO2 29.0 22.0 - 29.0 mmol/L    Calcium 8.5 (L) 8.6 - 10.5 mg/dL    Total Protein 7.2 6.0 - 8.5 g/dL    Albumin 4.10 3.50 - 5.20 g/dL    ALT (SGPT) 33 1 - 33 U/L    AST (SGOT) 20 1 - 32 U/L    Alkaline Phosphatase 48 39 - 117 U/L    Total Bilirubin 0.5 0.2 - 1.2 mg/dL    eGFR Non African Amer 124 >60 mL/min/1.73    eGFR  African Amer >150 >60  mL/min/1.73    Globulin 3.1 gm/dL    A/G Ratio 1.3 g/dL    BUN/Creatinine Ratio 26.4 (H) 7.0 - 25.0    Anion Gap 8.0 5.0 - 15.0 mmol/L   Results for orders placed or performed in visit on 12/10/18   Vitamin D 25 hydroxy   Result Value Ref Range    25 Hydroxy, Vitamin D 37.4 30.0 - 100.0 ng/ml   CBC No Differential   Result Value Ref Range    WBC 5.54 3.20 - 9.80 10*3/mm3    RBC 4.13 3.77 - 5.16 10*6/mm3    Hemoglobin 12.7 12.0 - 15.5 g/dL    Hematocrit 37.5 35.0 - 45.0 %    MCV 90.8 80.0 - 98.0 fL    MCH 30.8 26.5 - 34.0 pg    MCHC 33.9 31.4 - 36.0 g/dL    RDW 12.4 11.5 - 14.5 %    RDW-SD 41.2 36.4 - 46.3 fl    MPV 9.6 8.0 - 12.0 fL    Platelets 272 150 - 450 10*3/mm3   TSH   Result Value Ref Range    TSH 1.320 0.460 - 4.680 mIU/mL   T4, free   Result Value Ref Range    Free T4 0.93 0.78 - 2.19 ng/dL   Iron level   Result Value Ref Range    Iron 57 37 - 170 mcg/dL   Vitamin B12   Result Value Ref Range    Vitamin B-12 644 239 - 931 pg/mL   Comprehensive metabolic panel   Result Value Ref Range    Glucose 100 60 - 100 mg/dL    BUN 17 7 - 21 mg/dL    Creatinine 0.58 0.50 - 1.00 mg/dL    Sodium 136 (L) 137 - 145 mmol/L    Potassium 4.0 3.5 - 5.1 mmol/L    Chloride 101 95 - 110 mmol/L    CO2 28.0 22.0 - 31.0 mmol/L    Calcium 8.3 (L) 8.4 - 10.2 mg/dL    Total Protein 7.0 6.3 - 8.6 g/dL    Albumin 4.20 3.40 - 4.80 g/dL    ALT (SGPT) 44 9 - 52 U/L    AST (SGOT) 56 (H) 14 - 36 U/L    Alkaline Phosphatase 47 38 - 126 U/L    Total Bilirubin 0.3 0.2 - 1.3 mg/dL    eGFR Non  Amer 112 58 - 135 mL/min/1.73    eGFR   Amer 136 (H) 58 - 135 mL/min/1.73    Globulin 2.8 2.3 - 3.5 gm/dL    A/G Ratio 1.5 1.1 - 1.8 g/dL    BUN/Creatinine Ratio 29.3 (H) 7.0 - 25.0    Anion Gap 7.0 5.0 - 15.0 mmol/L   Results for orders placed or performed in visit on 10/31/17   Hepatic Function Panel   Result Value Ref Range    Total Protein 7.3 6.3 - 8.6 g/dL    Albumin 4.10 3.40 - 4.80 g/dL    ALT (SGPT) 48 9 - 52 U/L    AST (SGOT) 59 (H)  14 - 36 U/L    Alkaline Phosphatase 44 38 - 126 U/L    Total Bilirubin 0.7 0.2 - 1.3 mg/dL    Bilirubin, Direct 0.0 0.0 - 0.3 mg/dL    Bilirubin, Indirect 0.5 0.0 - 1.1 mg/dL   Results for orders placed or performed in visit on 10/17/17   CBC No Differential   Result Value Ref Range    WBC 5.79 3.20 - 9.80 10*3/mm3    RBC 4.20 3.77 - 5.16 10*6/mm3    Hemoglobin 12.7 12.0 - 15.5 g/dL    Hematocrit 38.6 35.0 - 45.0 %    MCV 91.9 80.0 - 98.0 fL    MCH 30.2 26.5 - 34.0 pg    MCHC 32.9 31.4 - 36.0 g/dL    RDW 12.4 11.5 - 14.5 %    RDW-SD 42.0 36.4 - 46.3 fl    MPV 10.0 8.0 - 12.0 fL    Platelets 278 150 - 450 10*3/mm3   Lipid panel   Result Value Ref Range    Total Cholesterol 173 0 - 199 mg/dL    Triglycerides 40 20 - 199 mg/dL    HDL Cholesterol 62 60 - 200 mg/dL    LDL Cholesterol  84 1 - 129 mg/dL    LDL/HDL Ratio 1.66 0.00 - 3.22   Comprehensive metabolic panel   Result Value Ref Range    Glucose 81 60 - 100 mg/dL    BUN 13 7 - 21 mg/dL    Creatinine 0.52 0.50 - 1.00 mg/dL    Sodium 139 137 - 145 mmol/L    Potassium 4.7 3.5 - 5.1 mmol/L    Chloride 103 95 - 110 mmol/L    CO2 28.0 22.0 - 31.0 mmol/L    Calcium 8.5 8.4 - 10.2 mg/dL    Total Protein 7.4 6.3 - 8.6 g/dL    Albumin 4.10 3.40 - 4.80 g/dL    ALT (SGPT) 63 (H) 9 - 52 U/L    AST (SGOT) 61 (H) 14 - 36 U/L    Alkaline Phosphatase 42 38 - 126 U/L    Total Bilirubin 0.7 0.2 - 1.3 mg/dL    eGFR Non  Amer 128 58 - 135 mL/min/1.73    eGFR   Amer 155 (H) 58 - 135 mL/min/1.73    Globulin 3.3 2.3 - 3.5 gm/dL    A/G Ratio 1.2 1.1 - 1.8 g/dL    BUN/Creatinine Ratio 25.0 7.0 - 25.0    Anion Gap 8.0 5.0 - 15.0 mmol/L     *Note: Due to a large number of results and/or encounters for the requested time period, some results have not been displayed. A complete set of results can be found in Results Review.

## 2020-01-14 NOTE — PROGRESS NOTES
Chief Complaint   Patient presents with   • Constipation   • Diarrhea       Subjective    Brittnee Boyd is a 44 y.o. female. she is here today for follow-up.    History of Present Illness  44-year-old female presents to discuss abdominal pain.  She reports she has frequent abdominal pain 2-3 times per day typically in the morning followed by loose bowel movement.  States she knows she has a hemorrhoid which has become very painful recently she was evaluated in urgent care and rectal exam there noted external hemorrhoid she was treated with hydrocortisone cream which has not improved symptoms at all.  States that she only has bowel movements in the morning she typically has urgency to have a bowel movement throughout the day she denies any rectal pain.  States symptoms been ongoing for the last 1 to 2 months she denies issues with constipation.  States stool is about finger size and does not feel like she is adequately emptying.  She was previously seen in this office in  however I am unable to review notes at this time.  Visit was conducted with use of [employer] translation services patient speaks Mandarin Chinese and does not understand English well.       The following portions of the patient's history were reviewed and updated as appropriate:   Past Medical History:   Diagnosis Date   • Abdominal pain, epigastric    • Acute pharyngitis    • Cough    • Diarrhea of presumed infectious origin    • Dysuria    • Epigastric pain    • Helicobacter positive gastritis    • Irregular menstrual cycle    • Irregular periods    • Onychomycosis    • Pain in right shoulder    • Pain in unspecified upper arm     BILATERAL   • Paronychia of finger    • Polyp of cervix    • Rhinitis    • Right flank pain    • Right lower quadrant pain    • Upper respiratory infection    • Urinary tract infectious disease      Past Surgical History:   Procedure Laterality Date   •  SECTION     • ENDOSCOPY  2014    EGD w/ tube 12712  (Normal esophagus. Erosive in stomach. Biopsy taken. Normal duodenum. Biopsy taken.)     Family History   Problem Relation Age of Onset   • Breast cancer Neg Hx    • Ovarian cancer Neg Hx    • Uterine cancer Neg Hx    • Colon cancer Neg Hx      OB History        2    Para   2    Term   2            AB        Living   2       SAB        TAB        Ectopic        Molar        Multiple        Live Births                  Prior to Admission medications    Medication Sig Start Date End Date Taking? Authorizing Provider   hydrocortisone (ANUSOL-HC) 2.5 % rectal cream Apply to rectal area BID. May apply after bowel movement. 19   Kathy Recio APRN   sodium-potassium-magnesium sulfates (SUPREP BOWEL PREP KIT) 17.5-3.13-1.6 GM/177ML solution oral solution Take 1 bottle by mouth Every 12 (Twelve) Hours. 20   Radha Mcdonald APRN     No Known Allergies  Social History     Socioeconomic History   • Marital status:      Spouse name: Not on file   • Number of children: Not on file   • Years of education: Not on file   • Highest education level: Not on file   Tobacco Use   • Smoking status: Never Smoker   • Smokeless tobacco: Never Used   Substance and Sexual Activity   • Alcohol use: No   • Drug use: No   • Sexual activity: Yes     Partners: Male     Birth control/protection: None       Review of Systems  Review of Systems   Constitutional: Positive for fatigue. Negative for activity change, appetite change, chills, diaphoresis, fever and unexpected weight change.   HENT: Negative for sore throat and trouble swallowing.    Respiratory: Negative for shortness of breath.    Gastrointestinal: Positive for abdominal pain. Negative for abdominal distention, anal bleeding, blood in stool, constipation, diarrhea, nausea, rectal pain and vomiting.        Change in bowel habits    Musculoskeletal: Negative for arthralgias.   Skin: Negative for pallor.   Neurological: Negative for light-headedness.  "       BP 97/62 (BP Location: Left arm)   Pulse 76   Ht 157.5 cm (62\")   Wt 63.6 kg (140 lb 3.2 oz)   BMI 25.64 kg/m²     Objective    Physical Exam   Constitutional: She is oriented to person, place, and time. She appears well-developed and well-nourished. She is cooperative. No distress.   HENT:   Head: Normocephalic and atraumatic.   Neck: Normal range of motion. Neck supple. No thyromegaly present.   Cardiovascular: Normal rate, regular rhythm and normal heart sounds.   Pulmonary/Chest: Effort normal and breath sounds normal. She has no wheezes. She has no rhonchi. She has no rales.   Abdominal: Soft. Normal appearance and bowel sounds are normal. She exhibits no distension. There is no hepatosplenomegaly. There is tenderness in the right lower quadrant. There is no rigidity and no guarding. No hernia.   Lymphadenopathy:     She has no cervical adenopathy.   Neurological: She is alert and oriented to person, place, and time.   Skin: Skin is warm, dry and intact. No rash noted. No pallor.   Psychiatric: She has a normal mood and affect. Her speech is normal.     Lab on 12/12/2019   Component Date Value Ref Range Status   • Glucose 12/12/2019 90  65 - 99 mg/dL Final   • BUN 12/12/2019 14  6 - 20 mg/dL Final   • Creatinine 12/12/2019 0.53* 0.57 - 1.00 mg/dL Final   • Sodium 12/12/2019 139  136 - 145 mmol/L Final   • Potassium 12/12/2019 3.9  3.5 - 5.2 mmol/L Final   • Chloride 12/12/2019 102  98 - 107 mmol/L Final   • CO2 12/12/2019 29.0  22.0 - 29.0 mmol/L Final   • Calcium 12/12/2019 8.5* 8.6 - 10.5 mg/dL Final   • Total Protein 12/12/2019 7.2  6.0 - 8.5 g/dL Final   • Albumin 12/12/2019 4.10  3.50 - 5.20 g/dL Final   • ALT (SGPT) 12/12/2019 33  1 - 33 U/L Final   • AST (SGOT) 12/12/2019 20  1 - 32 U/L Final   • Alkaline Phosphatase 12/12/2019 48  39 - 117 U/L Final   • Total Bilirubin 12/12/2019 0.5  0.2 - 1.2 mg/dL Final   • eGFR Non African Amer 12/12/2019 124  >60 mL/min/1.73 Final   • eGFR  African Amer " 12/12/2019 >150  >60 mL/min/1.73 Final   • Globulin 12/12/2019 3.1  gm/dL Final   • A/G Ratio 12/12/2019 1.3  g/dL Final   • BUN/Creatinine Ratio 12/12/2019 26.4* 7.0 - 25.0 Final   • Anion Gap 12/12/2019 8.0  5.0 - 15.0 mmol/L Final   • WBC 12/12/2019 4.56  3.40 - 10.80 10*3/mm3 Final   • RBC 12/12/2019 4.15  3.77 - 5.28 10*6/mm3 Final   • Hemoglobin 12/12/2019 13.0  12.0 - 15.9 g/dL Final   • Hematocrit 12/12/2019 38.6  34.0 - 46.6 % Final   • MCV 12/12/2019 93.0  79.0 - 97.0 fL Final   • MCH 12/12/2019 31.3  26.6 - 33.0 pg Final   • MCHC 12/12/2019 33.7  31.5 - 35.7 g/dL Final   • RDW 12/12/2019 11.5* 12.3 - 15.4 % Final   • RDW-SD 12/12/2019 39.0  37.0 - 54.0 fl Final   • MPV 12/12/2019 10.0  6.0 - 12.0 fL Final   • Platelets 12/12/2019 280  140 - 450 10*3/mm3 Final   • 25 Hydroxy, Vitamin D 12/12/2019 21.0* 30.0 - 100.0 ng/ml Final   • Vitamin B-12 12/12/2019 608  211 - 946 pg/mL Final   • Total Cholesterol 12/12/2019 149  0 - 200 mg/dL Final   • Triglycerides 12/12/2019 69  0 - 150 mg/dL Final   • HDL Cholesterol 12/12/2019 61* 40 - 60 mg/dL Final   • LDL Cholesterol  12/12/2019 74  0 - 100 mg/dL Final   • VLDL Cholesterol 12/12/2019 13.8  5 - 40 mg/dL Final   • LDL/HDL Ratio 12/12/2019 1.22   Final     Assessment/Plan      1. Right lower quadrant abdominal pain    2. Change in bowel habits    3. Anal or rectal pain    .       Orders placed during this encounter include:  Orders Placed This Encounter   Procedures   • Follow Anesthesia Guidelines / Standing Orders     Standing Status:   Future   • Obtain Informed Consent     Standing Status:   Future     Order Specific Question:   Informed Consent Given For     Answer:   COLONOSCOPY       COLONOSCOPY (N/A)    Review and/or summary of lab tests, radiology, procedures, medications. Review and summary of old records and obtaining of history. The risks and benefits of my recommendations, as well as other treatment options were discussed with the patient today.  Questions were answered.    New Medications Ordered This Visit   Medications   • sodium-potassium-magnesium sulfates (SUPREP BOWEL PREP KIT) 17.5-3.13-1.6 GM/177ML solution oral solution     Sig: Take 1 bottle by mouth Every 12 (Twelve) Hours.     Dispense:  2 bottle     Refill:  0       Follow-up: Return in about 4 weeks (around 2/11/2020) for After test.          This document has been electronically signed by CASSY Long on January 17, 2020 11:41 AM             Results for orders placed or performed in visit on 12/12/19   Vitamin D 25 hydroxy   Result Value Ref Range    25 Hydroxy, Vitamin D 21.0 (L) 30.0 - 100.0 ng/ml   CBC No Differential   Result Value Ref Range    WBC 4.56 3.40 - 10.80 10*3/mm3    RBC 4.15 3.77 - 5.28 10*6/mm3    Hemoglobin 13.0 12.0 - 15.9 g/dL    Hematocrit 38.6 34.0 - 46.6 %    MCV 93.0 79.0 - 97.0 fL    MCH 31.3 26.6 - 33.0 pg    MCHC 33.7 31.5 - 35.7 g/dL    RDW 11.5 (L) 12.3 - 15.4 %    RDW-SD 39.0 37.0 - 54.0 fl    MPV 10.0 6.0 - 12.0 fL    Platelets 280 140 - 450 10*3/mm3   Vitamin B12   Result Value Ref Range    Vitamin B-12 608 211 - 946 pg/mL   Lipid panel   Result Value Ref Range    Total Cholesterol 149 0 - 200 mg/dL    Triglycerides 69 0 - 150 mg/dL    HDL Cholesterol 61 (H) 40 - 60 mg/dL    LDL Cholesterol  74 0 - 100 mg/dL    VLDL Cholesterol 13.8 5 - 40 mg/dL    LDL/HDL Ratio 1.22    Comprehensive metabolic panel   Result Value Ref Range    Glucose 90 65 - 99 mg/dL    BUN 14 6 - 20 mg/dL    Creatinine 0.53 (L) 0.57 - 1.00 mg/dL    Sodium 139 136 - 145 mmol/L    Potassium 3.9 3.5 - 5.2 mmol/L    Chloride 102 98 - 107 mmol/L    CO2 29.0 22.0 - 29.0 mmol/L    Calcium 8.5 (L) 8.6 - 10.5 mg/dL    Total Protein 7.2 6.0 - 8.5 g/dL    Albumin 4.10 3.50 - 5.20 g/dL    ALT (SGPT) 33 1 - 33 U/L    AST (SGOT) 20 1 - 32 U/L    Alkaline Phosphatase 48 39 - 117 U/L    Total Bilirubin 0.5 0.2 - 1.2 mg/dL    eGFR Non African Amer 124 >60 mL/min/1.73    eGFR  African Amer >150 >60  mL/min/1.73    Globulin 3.1 gm/dL    A/G Ratio 1.3 g/dL    BUN/Creatinine Ratio 26.4 (H) 7.0 - 25.0    Anion Gap 8.0 5.0 - 15.0 mmol/L   Results for orders placed or performed in visit on 12/10/18   Vitamin D 25 hydroxy   Result Value Ref Range    25 Hydroxy, Vitamin D 37.4 30.0 - 100.0 ng/ml   CBC No Differential   Result Value Ref Range    WBC 5.54 3.20 - 9.80 10*3/mm3    RBC 4.13 3.77 - 5.16 10*6/mm3    Hemoglobin 12.7 12.0 - 15.5 g/dL    Hematocrit 37.5 35.0 - 45.0 %    MCV 90.8 80.0 - 98.0 fL    MCH 30.8 26.5 - 34.0 pg    MCHC 33.9 31.4 - 36.0 g/dL    RDW 12.4 11.5 - 14.5 %    RDW-SD 41.2 36.4 - 46.3 fl    MPV 9.6 8.0 - 12.0 fL    Platelets 272 150 - 450 10*3/mm3   TSH   Result Value Ref Range    TSH 1.320 0.460 - 4.680 mIU/mL   T4, free   Result Value Ref Range    Free T4 0.93 0.78 - 2.19 ng/dL   Iron level   Result Value Ref Range    Iron 57 37 - 170 mcg/dL   Vitamin B12   Result Value Ref Range    Vitamin B-12 644 239 - 931 pg/mL   Comprehensive metabolic panel   Result Value Ref Range    Glucose 100 60 - 100 mg/dL    BUN 17 7 - 21 mg/dL    Creatinine 0.58 0.50 - 1.00 mg/dL    Sodium 136 (L) 137 - 145 mmol/L    Potassium 4.0 3.5 - 5.1 mmol/L    Chloride 101 95 - 110 mmol/L    CO2 28.0 22.0 - 31.0 mmol/L    Calcium 8.3 (L) 8.4 - 10.2 mg/dL    Total Protein 7.0 6.3 - 8.6 g/dL    Albumin 4.20 3.40 - 4.80 g/dL    ALT (SGPT) 44 9 - 52 U/L    AST (SGOT) 56 (H) 14 - 36 U/L    Alkaline Phosphatase 47 38 - 126 U/L    Total Bilirubin 0.3 0.2 - 1.3 mg/dL    eGFR Non  Amer 112 58 - 135 mL/min/1.73    eGFR   Amer 136 (H) 58 - 135 mL/min/1.73    Globulin 2.8 2.3 - 3.5 gm/dL    A/G Ratio 1.5 1.1 - 1.8 g/dL    BUN/Creatinine Ratio 29.3 (H) 7.0 - 25.0    Anion Gap 7.0 5.0 - 15.0 mmol/L   Results for orders placed or performed in visit on 10/31/17   Hepatic Function Panel   Result Value Ref Range    Total Protein 7.3 6.3 - 8.6 g/dL    Albumin 4.10 3.40 - 4.80 g/dL    ALT (SGPT) 48 9 - 52 U/L    AST (SGOT) 59 (H)  14 - 36 U/L    Alkaline Phosphatase 44 38 - 126 U/L    Total Bilirubin 0.7 0.2 - 1.3 mg/dL    Bilirubin, Direct 0.0 0.0 - 0.3 mg/dL    Bilirubin, Indirect 0.5 0.0 - 1.1 mg/dL   Results for orders placed or performed in visit on 10/17/17   CBC No Differential   Result Value Ref Range    WBC 5.79 3.20 - 9.80 10*3/mm3    RBC 4.20 3.77 - 5.16 10*6/mm3    Hemoglobin 12.7 12.0 - 15.5 g/dL    Hematocrit 38.6 35.0 - 45.0 %    MCV 91.9 80.0 - 98.0 fL    MCH 30.2 26.5 - 34.0 pg    MCHC 32.9 31.4 - 36.0 g/dL    RDW 12.4 11.5 - 14.5 %    RDW-SD 42.0 36.4 - 46.3 fl    MPV 10.0 8.0 - 12.0 fL    Platelets 278 150 - 450 10*3/mm3   Lipid panel   Result Value Ref Range    Total Cholesterol 173 0 - 199 mg/dL    Triglycerides 40 20 - 199 mg/dL    HDL Cholesterol 62 60 - 200 mg/dL    LDL Cholesterol  84 1 - 129 mg/dL    LDL/HDL Ratio 1.66 0.00 - 3.22   Comprehensive metabolic panel   Result Value Ref Range    Glucose 81 60 - 100 mg/dL    BUN 13 7 - 21 mg/dL    Creatinine 0.52 0.50 - 1.00 mg/dL    Sodium 139 137 - 145 mmol/L    Potassium 4.7 3.5 - 5.1 mmol/L    Chloride 103 95 - 110 mmol/L    CO2 28.0 22.0 - 31.0 mmol/L    Calcium 8.5 8.4 - 10.2 mg/dL    Total Protein 7.4 6.3 - 8.6 g/dL    Albumin 4.10 3.40 - 4.80 g/dL    ALT (SGPT) 63 (H) 9 - 52 U/L    AST (SGOT) 61 (H) 14 - 36 U/L    Alkaline Phosphatase 42 38 - 126 U/L    Total Bilirubin 0.7 0.2 - 1.3 mg/dL    eGFR Non  Amer 128 58 - 135 mL/min/1.73    eGFR   Amer 155 (H) 58 - 135 mL/min/1.73    Globulin 3.3 2.3 - 3.5 gm/dL    A/G Ratio 1.2 1.1 - 1.8 g/dL    BUN/Creatinine Ratio 25.0 7.0 - 25.0    Anion Gap 8.0 5.0 - 15.0 mmol/L     *Note: Due to a large number of results and/or encounters for the requested time period, some results have not been displayed. A complete set of results can be found in Results Review.

## 2020-01-16 ENCOUNTER — DOCUMENTATION (OUTPATIENT)
Dept: GASTROENTEROLOGY | Facility: CLINIC | Age: 45
End: 2020-01-16

## 2020-01-21 ENCOUNTER — HOSPITAL ENCOUNTER (OUTPATIENT)
Facility: HOSPITAL | Age: 45
Setting detail: HOSPITAL OUTPATIENT SURGERY
Discharge: HOME | End: 2020-01-21
Attending: INTERNAL MEDICINE | Admitting: INTERNAL MEDICINE

## 2020-01-21 ENCOUNTER — ANESTHESIA EVENT (OUTPATIENT)
Dept: GASTROENTEROLOGY | Facility: HOSPITAL | Age: 45
End: 2020-01-21

## 2020-01-21 ENCOUNTER — ANESTHESIA (OUTPATIENT)
Dept: GASTROENTEROLOGY | Facility: HOSPITAL | Age: 45
End: 2020-01-21

## 2020-01-21 VITALS
WEIGHT: 135.13 LBS | DIASTOLIC BLOOD PRESSURE: 60 MMHG | TEMPERATURE: 97.9 F | SYSTOLIC BLOOD PRESSURE: 107 MMHG | BODY MASS INDEX: 24.87 KG/M2 | HEART RATE: 94 BPM | OXYGEN SATURATION: 100 % | HEIGHT: 62 IN | RESPIRATION RATE: 16 BRPM

## 2020-01-21 DIAGNOSIS — K62.89 ANAL OR RECTAL PAIN: ICD-10-CM

## 2020-01-21 DIAGNOSIS — R19.4 CHANGE IN BOWEL HABITS: ICD-10-CM

## 2020-01-21 DIAGNOSIS — R10.31 RIGHT LOWER QUADRANT ABDOMINAL PAIN: ICD-10-CM

## 2020-01-21 PROCEDURE — 25010000002 PROPOFOL 10 MG/ML EMULSION: Performed by: NURSE ANESTHETIST, CERTIFIED REGISTERED

## 2020-01-21 PROCEDURE — 45378 DIAGNOSTIC COLONOSCOPY: CPT | Performed by: INTERNAL MEDICINE

## 2020-01-21 RX ORDER — PROPOFOL 10 MG/ML
VIAL (ML) INTRAVENOUS AS NEEDED
Status: DISCONTINUED | OUTPATIENT
Start: 2020-01-21 | End: 2020-01-21 | Stop reason: SURG

## 2020-01-21 RX ORDER — DEXTROSE AND SODIUM CHLORIDE 5; .45 G/100ML; G/100ML
30 INJECTION, SOLUTION INTRAVENOUS CONTINUOUS PRN
Status: DISCONTINUED | OUTPATIENT
Start: 2020-01-21 | End: 2020-01-21 | Stop reason: HOSPADM

## 2020-01-21 RX ORDER — LIDOCAINE HYDROCHLORIDE 20 MG/ML
INJECTION, SOLUTION EPIDURAL; INFILTRATION; INTRACAUDAL; PERINEURAL AS NEEDED
Status: DISCONTINUED | OUTPATIENT
Start: 2020-01-21 | End: 2020-01-21 | Stop reason: SURG

## 2020-01-21 RX ADMIN — PROPOFOL 20 MG: 10 INJECTION, EMULSION INTRAVENOUS at 15:57

## 2020-01-21 RX ADMIN — PROPOFOL 20 MG: 10 INJECTION, EMULSION INTRAVENOUS at 15:59

## 2020-01-21 RX ADMIN — PROPOFOL 20 MG: 10 INJECTION, EMULSION INTRAVENOUS at 15:55

## 2020-01-21 RX ADMIN — PROPOFOL 40 MG: 10 INJECTION, EMULSION INTRAVENOUS at 16:00

## 2020-01-21 RX ADMIN — PROPOFOL 30 MG: 10 INJECTION, EMULSION INTRAVENOUS at 16:04

## 2020-01-21 RX ADMIN — PROPOFOL 20 MG: 10 INJECTION, EMULSION INTRAVENOUS at 16:01

## 2020-01-21 RX ADMIN — LIDOCAINE HYDROCHLORIDE 50 MG: 20 INJECTION, SOLUTION EPIDURAL; INFILTRATION; INTRACAUDAL; PERINEURAL at 15:54

## 2020-01-21 RX ADMIN — PROPOFOL 20 MG: 10 INJECTION, EMULSION INTRAVENOUS at 16:05

## 2020-01-21 RX ADMIN — PROPOFOL 20 MG: 10 INJECTION, EMULSION INTRAVENOUS at 16:03

## 2020-01-21 RX ADMIN — DEXTROSE AND SODIUM CHLORIDE 30 ML/HR: 5; 450 INJECTION, SOLUTION INTRAVENOUS at 15:37

## 2020-01-21 RX ADMIN — PROPOFOL 60 MG: 10 INJECTION, EMULSION INTRAVENOUS at 15:54

## 2020-01-21 NOTE — ANESTHESIA PREPROCEDURE EVALUATION
Anesthesia Evaluation     Patient summary reviewed and Nursing notes reviewed   NPO Solid Status: > 8 hours  NPO Liquid Status: > 8 hours           Airway   Mallampati: II  TM distance: >3 FB  Neck ROM: full  No difficulty expected  Dental - normal exam     Pulmonary - negative pulmonary ROS and normal exam   Cardiovascular - negative cardio ROS and normal exam        Neuro/Psych- negative ROS  GI/Hepatic/Renal/Endo - negative ROS     Musculoskeletal     Abdominal  - normal exam   Substance History - negative use     OB/GYN negative ob/gyn ROS         Other   arthritis,                      Anesthesia Plan    ASA 2     MAC     intravenous induction     Anesthetic plan, all risks, benefits, and alternatives have been provided, discussed and informed consent has been obtained with: patient.

## 2020-01-21 NOTE — ANESTHESIA POSTPROCEDURE EVALUATION
Patient: Brittnee Boyd    Procedure Summary     Date:  01/21/20 Room / Location:  [facility] ENDOSCOPY 1 / [facility] ENDOSCOPY    Anesthesia Start:  1551 Anesthesia Stop:  1610    Procedure:  COLONOSCOPY (N/A ) Diagnosis:       Right lower quadrant abdominal pain      Change in bowel habits      Anal or rectal pain      (Right lower quadrant abdominal pain [R10.31])      (Change in bowel habits [R19.4])      (Anal or rectal pain [K62.89])    Surgeon:  Trey Lara MD Provider:  Brooklyn De Dios CRNA    Anesthesia Type:  MAC ASA Status:  2          Anesthesia Type: MAC    Vitals  No vitals data found for the desired time range.          Post Anesthesia Care and Evaluation    Patient location during evaluation: bedside  Patient participation: waiting for patient participation  Level of consciousness: sleepy but conscious  Pain score: 0  Pain management: adequate  Airway patency: patent  Anesthetic complications: No anesthetic complications  PONV Status: none  Cardiovascular status: acceptable  Respiratory status: acceptable  Hydration status: acceptable

## 2020-01-28 ENCOUNTER — APPOINTMENT (OUTPATIENT)
Dept: LAB | Facility: HOSPITAL | Age: 45
End: 2020-01-28

## 2020-01-28 ENCOUNTER — PROCEDURE VISIT (OUTPATIENT)
Dept: FAMILY MEDICINE CLINIC | Facility: CLINIC | Age: 45
End: 2020-01-28

## 2020-01-28 VITALS
HEIGHT: 62 IN | TEMPERATURE: 96.8 F | SYSTOLIC BLOOD PRESSURE: 100 MMHG | HEART RATE: 70 BPM | OXYGEN SATURATION: 98 % | DIASTOLIC BLOOD PRESSURE: 86 MMHG | WEIGHT: 140.44 LBS | BODY MASS INDEX: 25.84 KG/M2

## 2020-01-28 DIAGNOSIS — Z01.419 PAP SMEAR, AS PART OF ROUTINE GYNECOLOGICAL EXAMINATION: Primary | ICD-10-CM

## 2020-01-28 LAB
CANDIDA ALBICANS: NEGATIVE
GARDNERELLA VAGINALIS: POSITIVE
T VAGINALIS DNA VAG QL PROBE+SIG AMP: NEGATIVE

## 2020-01-28 PROCEDURE — 87480 CANDIDA DNA DIR PROBE: CPT | Performed by: STUDENT IN AN ORGANIZED HEALTH CARE EDUCATION/TRAINING PROGRAM

## 2020-01-28 PROCEDURE — 87660 TRICHOMONAS VAGIN DIR PROBE: CPT | Performed by: STUDENT IN AN ORGANIZED HEALTH CARE EDUCATION/TRAINING PROGRAM

## 2020-01-28 PROCEDURE — G0123 SCREEN CERV/VAG THIN LAYER: HCPCS | Performed by: STUDENT IN AN ORGANIZED HEALTH CARE EDUCATION/TRAINING PROGRAM

## 2020-01-28 PROCEDURE — 87491 CHLMYD TRACH DNA AMP PROBE: CPT | Performed by: STUDENT IN AN ORGANIZED HEALTH CARE EDUCATION/TRAINING PROGRAM

## 2020-01-28 PROCEDURE — 87591 N.GONORRHOEAE DNA AMP PROB: CPT | Performed by: STUDENT IN AN ORGANIZED HEALTH CARE EDUCATION/TRAINING PROGRAM

## 2020-01-28 PROCEDURE — 87510 GARDNER VAG DNA DIR PROBE: CPT | Performed by: STUDENT IN AN ORGANIZED HEALTH CARE EDUCATION/TRAINING PROGRAM

## 2020-01-28 PROCEDURE — 87661 TRICHOMONAS VAGINALIS AMPLIF: CPT | Performed by: STUDENT IN AN ORGANIZED HEALTH CARE EDUCATION/TRAINING PROGRAM

## 2020-01-29 LAB
C TRACH RRNA CVX QL NAA+PROBE: NEGATIVE
N GONORRHOEA RRNA SPEC QL NAA+PROBE: NEGATIVE
TRICHOMONAS VAGINALIS PCR: NEGATIVE

## 2020-01-30 ENCOUNTER — OFFICE VISIT (OUTPATIENT)
Dept: GASTROENTEROLOGY | Facility: CLINIC | Age: 45
End: 2020-01-30

## 2020-01-30 VITALS
SYSTOLIC BLOOD PRESSURE: 118 MMHG | HEIGHT: 62 IN | WEIGHT: 142.6 LBS | BODY MASS INDEX: 26.24 KG/M2 | DIASTOLIC BLOOD PRESSURE: 68 MMHG | HEART RATE: 72 BPM

## 2020-01-30 DIAGNOSIS — K62.89 ANAL OR RECTAL PAIN: ICD-10-CM

## 2020-01-30 DIAGNOSIS — R10.811 RIGHT UPPER QUADRANT ABDOMINAL TENDERNESS, REBOUND TENDERNESS PRESENCE NOT SPECIFIED: ICD-10-CM

## 2020-01-30 DIAGNOSIS — K64.9 HEMORRHOIDS, UNSPECIFIED HEMORRHOID TYPE: Primary | ICD-10-CM

## 2020-01-30 LAB
GEN CATEG CVX/VAG CYTO-IMP: NORMAL
LAB AP CASE REPORT: NORMAL
LAB AP GYN ADDITIONAL INFORMATION: NORMAL
LAB AP GYN OTHER FINDINGS: NORMAL
PATH INTERP SPEC-IMP: NORMAL
STAT OF ADQ CVX/VAG CYTO-IMP: NORMAL

## 2020-01-30 PROCEDURE — 99213 OFFICE O/P EST LOW 20 MIN: CPT | Performed by: NURSE PRACTITIONER

## 2020-01-30 RX ORDER — HYDROCORTISONE ACETATE 25 MG/1
25 SUPPOSITORY RECTAL NIGHTLY PRN
Qty: 30 SUPPOSITORY | Refills: 1 | Status: SHIPPED | OUTPATIENT
Start: 2020-01-30 | End: 2020-03-03 | Stop reason: SDUPTHER

## 2020-01-30 NOTE — PROGRESS NOTES
Chief Complaint   Patient presents with   • Abdominal Pain   • Constipation   • Diarrhea       Subjective    Brittnee Boyd is a 44 y.o. female. she is here today for follow-up.    History of Present Illness  44-year-old female presents for follow-up after colonoscopy.  Reports when she eats greasy or spicy food symptoms seem to worsen and she will note blood in stool.  States Anusol cream has only mildly improved her symptoms reports she still has frequent rectal pain and intermittent blood within her stool.  States she has tried to increase dietary fiber consumption and eats lots of fruits and vegetables throughout the day.  Colonoscopy 2020 noted nonbleeding internal hemorrhoids during retroflexion hemorrhoids described as small.  Prep was adequate no specimens were collected.  High-fiber diet recommended along with repeat colonoscopy in 10 years for screening.  Vist conducted with patients personal .  translation services were offered here and patient declined.      The following portions of the patient's history were reviewed and updated as appropriate:   Past Medical History:   Diagnosis Date   • Abdominal pain, epigastric    • Acute pharyngitis    • Cough    • Diarrhea of presumed infectious origin    • Dysuria    • Epigastric pain    • Helicobacter positive gastritis    • Irregular menstrual cycle    • Irregular periods    • Onychomycosis    • Pain in right shoulder    • Pain in unspecified upper arm     BILATERAL   • Paronychia of finger    • Polyp of cervix    • Rhinitis    • Right flank pain    • Right lower quadrant pain    • Upper respiratory infection    • Urinary tract infectious disease      Past Surgical History:   Procedure Laterality Date   •  SECTION     • COLONOSCOPY N/A 2020    Procedure: COLONOSCOPY;  Surgeon: Trey Lara MD;  Location: [facility] ENDOSCOPY;  Service: Gastroenterology   • ENDOSCOPY  2014    EGD w/ tube 78533 (Normal esophagus. Erosive in  stomach. Biopsy taken. Normal duodenum. Biopsy taken.)     Family History   Problem Relation Age of Onset   • Breast cancer Neg Hx    • Ovarian cancer Neg Hx    • Uterine cancer Neg Hx    • Colon cancer Neg Hx      OB History        2    Para   2    Term   2            AB        Living   2       SAB        TAB        Ectopic        Molar        Multiple        Live Births                  Prior to Admission medications    Medication Sig Start Date End Date Taking? Authorizing Provider   hydrocortisone (ANUSOL-HC) 25 MG suppository Insert 1 suppository into the rectum At Night As Needed for Hemorrhoids (rectal discomfort/bleeding). 20   Radha Mcdonald APRN   hydrocortisone (ANUSOL-HC) 2.5 % rectal cream Apply to rectal area BID. May apply after bowel movement. 19  Kathy Recio APRN   sodium-potassium-magnesium sulfates (SUPREP BOWEL PREP KIT) 17.5-3.13-1.6 GM/177ML solution oral solution Take 1 bottle by mouth Every 12 (Twelve) Hours. 20  Radha Mcdonald APRN     No Known Allergies  Social History     Socioeconomic History   • Marital status:      Spouse name: Not on file   • Number of children: Not on file   • Years of education: Not on file   • Highest education level: Not on file   Tobacco Use   • Smoking status: Never Smoker   • Smokeless tobacco: Never Used   Substance and Sexual Activity   • Alcohol use: No   • Drug use: No   • Sexual activity: Yes     Partners: Male     Birth control/protection: None       Review of Systems  Review of Systems   Constitutional: Negative for activity change, appetite change, chills, diaphoresis, fatigue, fever and unexpected weight change.   HENT: Negative for sore throat and trouble swallowing.    Respiratory: Negative for shortness of breath.    Gastrointestinal: Positive for blood in stool (rare has occured twice ) and rectal pain. Negative for abdominal distention, abdominal pain, anal bleeding, constipation, diarrhea,  "nausea and vomiting.   Musculoskeletal: Negative for arthralgias.   Skin: Negative for pallor.   Neurological: Negative for light-headedness.        /68 (BP Location: Left arm)   Pulse 72   Ht 157.5 cm (62\")   Wt 64.7 kg (142 lb 9.6 oz)   BMI 26.08 kg/m²     Objective    Physical Exam   Constitutional: She is oriented to person, place, and time. She appears well-developed and well-nourished. She is cooperative. No distress.   HENT:   Head: Normocephalic and atraumatic.   Neck: Normal range of motion. Neck supple. No thyromegaly present.   Cardiovascular: Normal rate, regular rhythm and normal heart sounds.   Pulmonary/Chest: Effort normal and breath sounds normal. She has no wheezes. She has no rhonchi. She has no rales.   Abdominal: Soft. Normal appearance and bowel sounds are normal. She exhibits no distension. There is no hepatosplenomegaly. There is tenderness in the right upper quadrant. There is no rigidity and no guarding. No hernia.   Lymphadenopathy:     She has no cervical adenopathy.   Neurological: She is alert and oriented to person, place, and time.   Skin: Skin is warm, dry and intact. No rash noted. No pallor.   Psychiatric: She has a normal mood and affect. Her speech is normal.     Procedure visit on 01/28/2020   Component Date Value Ref Range Status   • Chlamydia DNA by PCR 01/28/2020 Negative  Negative Final   • Neisseria gonorrhoeae by PCR 01/28/2020 Negative  Negative Final   • Trichomonas vaginalis PCR 01/28/2020 Negative   Final   • LATONYA ALBICANS 01/28/2020 Negative   Final   • GARDNERELLA VAGINALIS 01/28/2020 Positive   Final   • TRICHOMONAS VAGINALIS 01/28/2020 Negative   Final   • Case Report 01/28/2020    Final                    Value:Gynecologic Cytology Report                       Case: [id number]                                  Authorizing Provider:  Feliberto Rodgers MD            Collected:           01/28/2020 03:38 PM          Ordering Location:     [facility]" MEDICAL     Received:            01/29/2020 07:30 AM                                 [facility]                                                        First Screen:          Kenisha Harper                                                              Specimen:    Liquid-Based Pap, Screening, Cervix                                                       • Interpretation 01/28/2020 Negative for intraepithelial lesion or malignancy    Final   • General Categorization 01/28/2020 Within normal limits   Final   • Other Findings 01/28/2020 Shift in ajay suggestive of bacterial vaginosis   Final   • Specimen Adequacy 01/28/2020 Satisfactory for evaluation   Final   • Additional Information 01/28/2020    Final                    Value:This result contains rich text formatting which cannot be displayed here.     Assessment/Plan      1. Hemorrhoids, unspecified hemorrhoid type    2. Anal or rectal pain    3. Right upper quadrant abdominal tenderness, rebound tenderness presence not specified    .   Discontinue Anusol cream as it has not been efficacious we will start patient on Anusol suppository.  Follow-up for recheck if no improvement will schedule with Dr. Lara for possible  hemorrhoidal banding if possible.  Ultrasound right upper quadrant due to tenderness on exam.    Orders placed during this encounter include:  Orders Placed This Encounter   Procedures   • US Gallbladder     Standing Status:   Future     Standing Expiration Date:   1/29/2021     Order Specific Question:   Reason for Exam:     Answer:   Ruq pain       * Surgery not found *    Review and/or summary of lab tests, radiology, procedures, medications. Review and summary of old records and obtaining of history. The risks and benefits of my recommendations, as well as other treatment options were discussed with the patient today. Questions were answered.    New Medications Ordered This Visit   Medications   • hydrocortisone (ANUSOL-HC) 25 MG  suppository     Sig: Insert 1 suppository into the rectum At Night As Needed for Hemorrhoids (rectal discomfort/bleeding).     Dispense:  30 suppository     Refill:  1       Follow-up: Return in about 4 weeks (around 2/27/2020) for After test.          This document has been electronically signed by CASSY Long on January 30, 2020 1:11 PM             Results for orders placed or performed in visit on 01/28/20   Chlamydia trachomatis, Neisseria gonorrhoeae, Trichomonas vaginalis, PCR - Swab, Vagina   Result Value Ref Range    Chlamydia DNA by PCR Negative Negative    Neisseria gonorrhoeae by PCR Negative Negative    Trichomonas vaginalis PCR Negative    Gardnerella vaginalis, Trichomonas vaginalis, Candida albicans, DNA - Swab, Vagina   Result Value Ref Range    CANDIDA ALBICANS Negative     GARDNERELLA VAGINALIS Positive     TRICHOMONAS VAGINALIS Negative    Liquid-based Pap Smear, Screening   Result Value Ref Range    Case Report       Gynecologic Cytology Report                       Case: [id number]                                  Authorizing Provider:  Feliberto Rodgers MD            Collected:           01/28/2020 03:38 PM          Ordering Location:     [facility]     Received:            01/29/2020 07:30 AM                                 [facility]                                                        First Screen:          Kenisha Harper                                                              Specimen:    Liquid-Based Pap, Screening, Cervix                                                        Interpretation Negative for intraepithelial lesion or malignancy      General Categorization Within normal limits     Other Findings Shift in ajay suggestive of bacterial vaginosis     Specimen Adequacy Satisfactory for evaluation     Additional Information       Disclaimer: Cervical cytology is a screening test primarily for squamous cancer and its precursors and has associated  false-negative and false-positive results.  Technologies such as liquid-based preparations may decrease but will not eliminate all false-negative results.  Follow-up of unexplained clinical signs and symptoms is recommended to minimize false-negative results. (The [employer] System for Reporting Cervical Cytology: [place], 2015).     Results for orders placed or performed in visit on 12/12/19   Vitamin D 25 hydroxy   Result Value Ref Range    25 Hydroxy, Vitamin D 21.0 (L) 30.0 - 100.0 ng/ml   CBC No Differential   Result Value Ref Range    WBC 4.56 3.40 - 10.80 10*3/mm3    RBC 4.15 3.77 - 5.28 10*6/mm3    Hemoglobin 13.0 12.0 - 15.9 g/dL    Hematocrit 38.6 34.0 - 46.6 %    MCV 93.0 79.0 - 97.0 fL    MCH 31.3 26.6 - 33.0 pg    MCHC 33.7 31.5 - 35.7 g/dL    RDW 11.5 (L) 12.3 - 15.4 %    RDW-SD 39.0 37.0 - 54.0 fl    MPV 10.0 6.0 - 12.0 fL    Platelets 280 140 - 450 10*3/mm3   Vitamin B12   Result Value Ref Range    Vitamin B-12 608 211 - 946 pg/mL   Lipid panel   Result Value Ref Range    Total Cholesterol 149 0 - 200 mg/dL    Triglycerides 69 0 - 150 mg/dL    HDL Cholesterol 61 (H) 40 - 60 mg/dL    LDL Cholesterol  74 0 - 100 mg/dL    VLDL Cholesterol 13.8 5 - 40 mg/dL    LDL/HDL Ratio 1.22    Comprehensive metabolic panel   Result Value Ref Range    Glucose 90 65 - 99 mg/dL    BUN 14 6 - 20 mg/dL    Creatinine 0.53 (L) 0.57 - 1.00 mg/dL    Sodium 139 136 - 145 mmol/L    Potassium 3.9 3.5 - 5.2 mmol/L    Chloride 102 98 - 107 mmol/L    CO2 29.0 22.0 - 29.0 mmol/L    Calcium 8.5 (L) 8.6 - 10.5 mg/dL    Total Protein 7.2 6.0 - 8.5 g/dL    Albumin 4.10 3.50 - 5.20 g/dL    ALT (SGPT) 33 1 - 33 U/L    AST (SGOT) 20 1 - 32 U/L    Alkaline Phosphatase 48 39 - 117 U/L    Total Bilirubin 0.5 0.2 - 1.2 mg/dL    eGFR Non African Amer 124 >60 mL/min/1.73    eGFR  African Amer >150 >60 mL/min/1.73    Globulin 3.1 gm/dL    A/G Ratio 1.3 g/dL    BUN/Creatinine Ratio 26.4 (H) 7.0 - 25.0    Anion Gap 8.0 5.0 - 15.0 mmol/L   Results  for orders placed or performed in visit on 12/10/18   Vitamin D 25 hydroxy   Result Value Ref Range    25 Hydroxy, Vitamin D 37.4 30.0 - 100.0 ng/ml   CBC No Differential   Result Value Ref Range    WBC 5.54 3.20 - 9.80 10*3/mm3    RBC 4.13 3.77 - 5.16 10*6/mm3    Hemoglobin 12.7 12.0 - 15.5 g/dL    Hematocrit 37.5 35.0 - 45.0 %    MCV 90.8 80.0 - 98.0 fL    MCH 30.8 26.5 - 34.0 pg    MCHC 33.9 31.4 - 36.0 g/dL    RDW 12.4 11.5 - 14.5 %    RDW-SD 41.2 36.4 - 46.3 fl    MPV 9.6 8.0 - 12.0 fL    Platelets 272 150 - 450 10*3/mm3   TSH   Result Value Ref Range    TSH 1.320 0.460 - 4.680 mIU/mL   T4, free   Result Value Ref Range    Free T4 0.93 0.78 - 2.19 ng/dL   Iron level   Result Value Ref Range    Iron 57 37 - 170 mcg/dL   Vitamin B12   Result Value Ref Range    Vitamin B-12 644 239 - 931 pg/mL   Comprehensive metabolic panel   Result Value Ref Range    Glucose 100 60 - 100 mg/dL    BUN 17 7 - 21 mg/dL    Creatinine 0.58 0.50 - 1.00 mg/dL    Sodium 136 (L) 137 - 145 mmol/L    Potassium 4.0 3.5 - 5.1 mmol/L    Chloride 101 95 - 110 mmol/L    CO2 28.0 22.0 - 31.0 mmol/L    Calcium 8.3 (L) 8.4 - 10.2 mg/dL    Total Protein 7.0 6.3 - 8.6 g/dL    Albumin 4.20 3.40 - 4.80 g/dL    ALT (SGPT) 44 9 - 52 U/L    AST (SGOT) 56 (H) 14 - 36 U/L    Alkaline Phosphatase 47 38 - 126 U/L    Total Bilirubin 0.3 0.2 - 1.3 mg/dL    eGFR Non  Amer 112 58 - 135 mL/min/1.73    eGFR   Amer 136 (H) 58 - 135 mL/min/1.73    Globulin 2.8 2.3 - 3.5 gm/dL    A/G Ratio 1.5 1.1 - 1.8 g/dL    BUN/Creatinine Ratio 29.3 (H) 7.0 - 25.0    Anion Gap 7.0 5.0 - 15.0 mmol/L   Results for orders placed or performed in visit on 10/31/17   Hepatic Function Panel   Result Value Ref Range    Total Protein 7.3 6.3 - 8.6 g/dL    Albumin 4.10 3.40 - 4.80 g/dL    ALT (SGPT) 48 9 - 52 U/L    AST (SGOT) 59 (H) 14 - 36 U/L    Alkaline Phosphatase 44 38 - 126 U/L    Total Bilirubin 0.7 0.2 - 1.3 mg/dL    Bilirubin, Direct 0.0 0.0 - 0.3 mg/dL     Bilirubin, Indirect 0.5 0.0 - 1.1 mg/dL   Results for orders placed or performed in visit on 10/17/17   CBC No Differential   Result Value Ref Range    WBC 5.79 3.20 - 9.80 10*3/mm3    RBC 4.20 3.77 - 5.16 10*6/mm3    Hemoglobin 12.7 12.0 - 15.5 g/dL    Hematocrit 38.6 35.0 - 45.0 %    MCV 91.9 80.0 - 98.0 fL    MCH 30.2 26.5 - 34.0 pg    MCHC 32.9 31.4 - 36.0 g/dL    RDW 12.4 11.5 - 14.5 %    RDW-SD 42.0 36.4 - 46.3 fl    MPV 10.0 8.0 - 12.0 fL    Platelets 278 150 - 450 10*3/mm3     *Note: Due to a large number of results and/or encounters for the requested time period, some results have not been displayed. A complete set of results can be found in Results Review.

## 2020-01-30 NOTE — PATIENT INSTRUCTIONS

## 2020-02-05 NOTE — PROGRESS NOTES
[facility]  Pap smear     HPI  Brittnee Boyd is a 44 y.o.   female who presents for her annual well woman exam.  The patient has no complaints.  Denies any vaginal itching, burning, irritation, or discharge.  Denies any abnormal uterine bleeding. Continues to be sexually active.  Denies any sexual dysfunction concerns.  Denies any urinary symptoms including incontinence, dysuria, frequency, urgency, nocturia.    HEALTH MAINTENANCE  Mammogram: coming up on 20  Last Completed Mammogram     Patient has no health maintenance due at this time        Colonoscopy: completed   Last Completed Colonoscopy       Status Date      COLONOSCOPY Done 2020 COLONOSCOPY          Pap smear: today  Last Completed Pap Smear       Status Date      PAP SMEAR Done 2020 LIQUID-BASED PAP SMEAR, SCREENING     Patient has more history with this topic...          GYN HISTORY     Menarche: unsure  Menses:regular   History of STIs: none   Last pap smear: no previous history   Abnormal pap smear history: none   Contraception: none     OB HISTORY  OB History    Para Term  AB Living   2 2 2     2   SAB TAB Ectopic Molar Multiple Live Births                    # Outcome Date GA Lbr Benjamin/2nd Weight Sex Delivery Anes PTL Lv   2 Term      CS-LTranv      1 Term      Vag-Spont        PAST MEDICAL HISTORY  Past Medical History:   Diagnosis Date   • Abdominal pain, epigastric    • Acute pharyngitis    • Cough    • Diarrhea of presumed infectious origin    • Dysuria    • Epigastric pain    • Helicobacter positive gastritis    • Irregular menstrual cycle    • Irregular periods    • Onychomycosis    • Pain in right shoulder    • Pain in unspecified upper arm     BILATERAL   • Paronychia of finger    • Polyp of cervix    • Rhinitis    • Right flank pain    • Right lower quadrant pain    • Upper respiratory infection    • Urinary tract infectious disease      PAST SURGICAL HISTORY  Past Surgical History:  "  Procedure Laterality Date   •  SECTION     • COLONOSCOPY N/A 2020    Procedure: COLONOSCOPY;  Surgeon: Trey Lara MD;  Location: [facility] ENDOSCOPY;  Service: Gastroenterology   • ENDOSCOPY  2014    EGD w/ tube 36977 (Normal esophagus. Erosive in stomach. Biopsy taken. Normal duodenum. Biopsy taken.)     FAMILY HISTORY  Family History   Problem Relation Age of Onset   • Breast cancer Neg Hx    • Ovarian cancer Neg Hx    • Uterine cancer Neg Hx    • Colon cancer Neg Hx      SOCIAL HISTORY  Social History     Socioeconomic History   • Marital status:      Spouse name: Not on file   • Number of children: Not on file   • Years of education: Not on file   • Highest education level: Not on file   Tobacco Use   • Smoking status: Never Smoker   • Smokeless tobacco: Never Used   Substance and Sexual Activity   • Alcohol use: No   • Drug use: No   • Sexual activity: Yes     Partners: Male     Birth control/protection: None     HOME MEDICATIONS  Prior to Admission medications    Medication Sig Start Date End Date Taking? Authorizing Provider   hydrocortisone (ANUSOL-HC) 25 MG suppository Insert 1 suppository into the rectum At Night As Needed for Hemorrhoids (rectal discomfort/bleeding). 20   Radha Mcdonald APRN     ALLERGIES  No Known Allergies    ROS  Review of Systems   Genitourinary: Negative for amenorrhea, decreased libido, decreased urine volume, difficulty urinating, dyspareunia, dysuria, flank pain, frequency, genital sores, hematuria, menstrual problem, pelvic pain, pelvic pressure, urgency, urinary incontinence, vaginal bleeding, vaginal discharge and vaginal pain.         PE  /86   Pulse 70   Temp 96.8 °F (36 °C) (Temporal)   Ht 157.5 cm (62\")   Wt 63.7 kg (140 lb 7 oz)   SpO2 98%   BMI 25.69 kg/m²        PELVIC EXAM:  External Genitalia/Vulva: Anatomy is normal, no significant redness of labia, no discharge on vulvar tissues, [place]'s and [place]th[place]'s glands are " normal, no ulcers, no condylomatous lesions    Urethra: Normal, no lesions   Vagina: Vaginal tissues are not inflamed, normal color and texture, no significant discharge present  Cervix: Normal in appearance without lesions or purulent discharge, no cervical motion tenderness    Uterus: Normal size, shape, and consistency.  Adnexa: Normal size and shape bilaterally, no palpable mass bilaterally and non-tender bilaterally    Rectal Exam: Normal, no masses or polyps, confirms bimanual exam, perianal normal, no lesions; MIGUELITO deferred.    IMPRESSION  Brittnee Boyd is a 44 y.o.  presenting for pap smear .    PLAN    1. Pap smear, as part of routine gynecological examination  Normal exam.   Will obtain routine labs.   - Gardnerella vaginalis, Trichomonas vaginalis, Candida albicans, DNA - Swab, Vagina; Future  - Chlamydia trachomatis, Neisseria gonorrhoeae, Trichomonas vaginalis, PCR - Swab, Vagina; Future  - Chlamydia trachomatis, Neisseria gonorrhoeae, Trichomonas vaginalis, PCR - Swab, Vagina  - Gardnerella vaginalis, Trichomonas vaginalis, Candida albicans, DNA - Swab, Vagina  - Liquid-based Pap Smear, Screening; Future  - Liquid-based Pap Smear, Screening      Signature  Feliberto Rodgers MD  [facility]          This document has been electronically signed by Feliberto Rodgers MD on 2020 3:18 PM      This document has been electronically signed by Feliberto Rodgers MD on 2020 3:18 PM          CC: Feliberto Rodgers MD

## 2020-02-11 ENCOUNTER — HOSPITAL ENCOUNTER (OUTPATIENT)
Dept: ULTRASOUND IMAGING | Facility: HOSPITAL | Age: 45
Discharge: HOME | End: 2020-02-11
Admitting: NURSE PRACTITIONER

## 2020-02-11 DIAGNOSIS — R10.811 RIGHT UPPER QUADRANT ABDOMINAL TENDERNESS, REBOUND TENDERNESS PRESENCE NOT SPECIFIED: ICD-10-CM

## 2020-02-11 PROCEDURE — 76705 ECHO EXAM OF ABDOMEN: CPT

## 2020-02-11 NOTE — PROGRESS NOTES
I have seen the patient.  I have reviewed the notes, assessments, and/or procedures performed by Feliberto Rodgers MD during office visit I concur with her/his documentation and assessment and plan for Diojuanraysa Boyd.            This document has been electronically signed by Antony Drew MD on February 11, 2020 11:25 AM

## 2020-02-18 ENCOUNTER — OFFICE VISIT (OUTPATIENT)
Dept: GASTROENTEROLOGY | Facility: CLINIC | Age: 45
End: 2020-02-18

## 2020-02-18 VITALS
SYSTOLIC BLOOD PRESSURE: 122 MMHG | HEIGHT: 62 IN | BODY MASS INDEX: 25.69 KG/M2 | HEART RATE: 89 BPM | WEIGHT: 139.6 LBS | DIASTOLIC BLOOD PRESSURE: 82 MMHG

## 2020-02-18 DIAGNOSIS — K64.9 HEMORRHOIDS, UNSPECIFIED HEMORRHOID TYPE: ICD-10-CM

## 2020-02-18 DIAGNOSIS — K62.89 ANAL OR RECTAL PAIN: ICD-10-CM

## 2020-02-18 DIAGNOSIS — R10.13 EPIGASTRIC PAIN: Primary | ICD-10-CM

## 2020-02-18 PROCEDURE — 99214 OFFICE O/P EST MOD 30 MIN: CPT | Performed by: NURSE PRACTITIONER

## 2020-02-18 RX ORDER — OMEPRAZOLE 40 MG/1
40 CAPSULE, DELAYED RELEASE ORAL DAILY
Qty: 30 CAPSULE | Refills: 11 | Status: SHIPPED | OUTPATIENT
Start: 2020-02-18 | End: 2020-06-14

## 2020-02-18 NOTE — PROGRESS NOTES
Chief Complaint   Patient presents with   • Constipation   • Diarrhea       Subjective    Brittnee Boyd is a 44 y.o. female. she is here today for follow-up.    History of Present Illness  44-year-old female presents to discuss ultrasound results along with continual rectal pain.  She tried Anusol suppositories for about a week and could tell no difference in her rectal pain but also she has discontinued.  Reports her bowel movements are typically 1-3 times per day depending on her intake she tries to avoid spicy greasy foods but still reports intermittent bloating, nausea at times and epigastric along with rectal pain.  Ultrasound was completed 2020 gallbladder appeared normal small cyst was noted right kidney.  Colonoscopy was normal but did note small hemorrhoids.  Visit was conducted with the use of IT[employer] translation services on office iPad.     The following portions of the patient's history were reviewed and updated as appropriate:   Past Medical History:   Diagnosis Date   • Abdominal pain, epigastric    • Acute pharyngitis    • Cough    • Diarrhea of presumed infectious origin    • Dysuria    • Epigastric pain    • Helicobacter positive gastritis    • Irregular menstrual cycle    • Irregular periods    • Onychomycosis    • Pain in right shoulder    • Pain in unspecified upper arm     BILATERAL   • Paronychia of finger    • Polyp of cervix    • Rhinitis    • Right flank pain    • Right lower quadrant pain    • Upper respiratory infection    • Urinary tract infectious disease      Past Surgical History:   Procedure Laterality Date   •  SECTION     • COLONOSCOPY N/A 2020    Procedure: COLONOSCOPY;  Surgeon: Trey Lara MD;  Location: [facility] ENDOSCOPY;  Service: Gastroenterology   • ENDOSCOPY  2014    EGD w/ tube 61322 (Normal esophagus. Erosive in stomach. Biopsy taken. Normal duodenum. Biopsy taken.)     Family History   Problem Relation Age of Onset   • Breast cancer Neg Hx    •  "Ovarian cancer Neg Hx    • Uterine cancer Neg Hx    • Colon cancer Neg Hx      OB History        2    Para   2    Term   2            AB        Living   2       SAB        TAB        Ectopic        Molar        Multiple        Live Births                  Prior to Admission medications    Medication Sig Start Date End Date Taking? Authorizing Provider   hydrocortisone (ANUSOL-HC) 25 MG suppository Insert 1 suppository into the rectum At Night As Needed for Hemorrhoids (rectal discomfort/bleeding). 20   Radha Mcdonald APRN     No Known Allergies  Social History     Socioeconomic History   • Marital status:      Spouse name: Not on file   • Number of children: Not on file   • Years of education: Not on file   • Highest education level: Not on file   Tobacco Use   • Smoking status: Never Smoker   • Smokeless tobacco: Never Used   Substance and Sexual Activity   • Alcohol use: No   • Drug use: No   • Sexual activity: Yes     Partners: Male     Birth control/protection: None       Review of Systems  Review of Systems   Constitutional: Positive for fatigue. Negative for activity change, appetite change, chills, diaphoresis, fever and unexpected weight change.   HENT: Negative for sore throat and trouble swallowing.    Respiratory: Negative for shortness of breath.    Gastrointestinal: Positive for abdominal distention (gassy ), nausea and rectal pain. Negative for abdominal pain, anal bleeding, blood in stool, constipation, diarrhea and vomiting.   Musculoskeletal: Negative for arthralgias.   Skin: Negative for pallor.   Neurological: Negative for light-headedness.        /82 (BP Location: Right arm)   Pulse 89   Ht 157.5 cm (62\")   Wt 63.3 kg (139 lb 9.6 oz)   BMI 25.53 kg/m²     Objective    Physical Exam   Constitutional: She is oriented to person, place, and time. She appears well-developed and well-nourished. She is cooperative. No distress.   HENT:   Head: Normocephalic and " atraumatic.   Neck: Normal range of motion. Neck supple. No thyromegaly present.   Cardiovascular: Normal rate, regular rhythm and normal heart sounds.   Pulmonary/Chest: Effort normal and breath sounds normal. She has no wheezes. She has no rhonchi. She has no rales.   Abdominal: Soft. Normal appearance and bowel sounds are normal. She exhibits no distension. There is no hepatosplenomegaly. There is tenderness in the epigastric area. There is no rigidity and no guarding. No hernia.   Lymphadenopathy:     She has no cervical adenopathy.   Neurological: She is alert and oriented to person, place, and time.   Skin: Skin is warm, dry and intact. No rash noted. No pallor.   Psychiatric: She has a normal mood and affect. Her speech is normal.     Procedure visit on 01/28/2020   Component Date Value Ref Range Status   • Chlamydia DNA by PCR 01/28/2020 Negative  Negative Final   • Neisseria gonorrhoeae by PCR 01/28/2020 Negative  Negative Final   • Trichomonas vaginalis PCR 01/28/2020 Negative   Final   • LATONYA ALBICANS 01/28/2020 Negative   Final   • GARDNERELLA VAGINALIS 01/28/2020 Positive   Final   • TRICHOMONAS VAGINALIS 01/28/2020 Negative   Final   • Case Report 01/28/2020    Final                    Value:Gynecologic Cytology Report                       Case: [id number]                                  Authorizing Provider:  Feliberto Rodgers MD            Collected:           01/28/2020 03:38 PM          Ordering Location:     [facility]     Received:            01/29/2020 07:30 AM                                 [facility]                                                        First Screen:          Kenisha Harper                                                              Specimen:    Liquid-Based Pap, Screening, Cervix                                                       • Interpretation 01/28/2020 Negative for intraepithelial lesion or malignancy    Final   • General Categorization  01/28/2020 Within normal limits   Final   • Other Findings 01/28/2020 Shift in ajay suggestive of bacterial vaginosis   Final   • Specimen Adequacy 01/28/2020 Satisfactory for evaluation   Final   • Additional Information 01/28/2020    Final                    Value:This result contains rich text formatting which cannot be displayed here.     Assessment/Plan      1. Epigastric pain    2. Hemorrhoids, unspecified hemorrhoid type    3. Anal or rectal pain    .   Will start patient on PPI daily due to epigastric pain, nausea bloating suspect that is due to indigestion/reflux recommend she avoid gastric irritants.  Discussed importance of fiber daily to help bulk stools.  Will obtain CT abdomen pelvis due to continual rectal pain.    Orders placed during this encounter include:  Orders Placed This Encounter   Procedures   • CT Abdomen Pelvis With Contrast     Standing Status:   Future     Standing Expiration Date:   2/18/2021     Order Specific Question:   Will Oral Contrast be needed for this procedure?     Answer:   Yes     Order Specific Question:   Patient Pregnant     Answer:   No       * Surgery not found *    Review and/or summary of lab tests, radiology, procedures, medications. Review and summary of old records and obtaining of history. The risks and benefits of my recommendations, as well as other treatment options were discussed with the patient today. Questions were answered.    New Medications Ordered This Visit   Medications   • omeprazole (priLOSEC) 40 MG capsule     Sig: Take 1 capsule by mouth Daily.     Dispense:  30 capsule     Refill:  11       Follow-up: Return in about 4 weeks (around 3/17/2020) for After test.          This document has been electronically signed by CASSY Long on February 18, 2020 9:41 AM             Results for orders placed or performed in visit on 01/28/20   Chlamydia trachomatis, Neisseria gonorrhoeae, Trichomonas vaginalis, PCR - Swab, Vagina   Result Value Ref Range     Chlamydia DNA by PCR Negative Negative    Neisseria gonorrhoeae by PCR Negative Negative    Trichomonas vaginalis PCR Negative    Gardnerella vaginalis, Trichomonas vaginalis, Candida albicans, DNA - Swab, Vagina   Result Value Ref Range    CANDIDA ALBICANS Negative     GARDNERELLA VAGINALIS Positive     TRICHOMONAS VAGINALIS Negative    Liquid-based Pap Smear, Screening   Result Value Ref Range    Case Report       Gynecologic Cytology Report                       Case: [id number]                                  Authorizing Provider:  Feliberto Rodgers MD            Collected:           01/28/2020 03:38 PM          Ordering Location:     [facility]     Received:            01/29/2020 07:30 AM                                 [facility]                                                        First Screen:          Kenisha Harper                                                              Specimen:    Liquid-Based Pap, Screening, Cervix                                                        Interpretation Negative for intraepithelial lesion or malignancy      General Categorization Within normal limits     Other Findings Shift in ajay suggestive of bacterial vaginosis     Specimen Adequacy Satisfactory for evaluation     Additional Information       Disclaimer: Cervical cytology is a screening test primarily for squamous cancer and its precursors and has associated false-negative and false-positive results.  Technologies such as liquid-based preparations may decrease but will not eliminate all false-negative results.  Follow-up of unexplained clinical signs and symptoms is recommended to minimize false-negative results. (The [place] System for Reporting Cervical Cytology: [place], 2015).     Results for orders placed or performed in visit on 12/12/19   Vitamin D 25 hydroxy   Result Value Ref Range    25 Hydroxy, Vitamin D 21.0 (L) 30.0 - 100.0 ng/ml   CBC No Differential   Result Value Ref  Range    WBC 4.56 3.40 - 10.80 10*3/mm3    RBC 4.15 3.77 - 5.28 10*6/mm3    Hemoglobin 13.0 12.0 - 15.9 g/dL    Hematocrit 38.6 34.0 - 46.6 %    MCV 93.0 79.0 - 97.0 fL    MCH 31.3 26.6 - 33.0 pg    MCHC 33.7 31.5 - 35.7 g/dL    RDW 11.5 (L) 12.3 - 15.4 %    RDW-SD 39.0 37.0 - 54.0 fl    MPV 10.0 6.0 - 12.0 fL    Platelets 280 140 - 450 10*3/mm3   Vitamin B12   Result Value Ref Range    Vitamin B-12 608 211 - 946 pg/mL   Lipid panel   Result Value Ref Range    Total Cholesterol 149 0 - 200 mg/dL    Triglycerides 69 0 - 150 mg/dL    HDL Cholesterol 61 (H) 40 - 60 mg/dL    LDL Cholesterol  74 0 - 100 mg/dL    VLDL Cholesterol 13.8 5 - 40 mg/dL    LDL/HDL Ratio 1.22    Comprehensive metabolic panel   Result Value Ref Range    Glucose 90 65 - 99 mg/dL    BUN 14 6 - 20 mg/dL    Creatinine 0.53 (L) 0.57 - 1.00 mg/dL    Sodium 139 136 - 145 mmol/L    Potassium 3.9 3.5 - 5.2 mmol/L    Chloride 102 98 - 107 mmol/L    CO2 29.0 22.0 - 29.0 mmol/L    Calcium 8.5 (L) 8.6 - 10.5 mg/dL    Total Protein 7.2 6.0 - 8.5 g/dL    Albumin 4.10 3.50 - 5.20 g/dL    ALT (SGPT) 33 1 - 33 U/L    AST (SGOT) 20 1 - 32 U/L    Alkaline Phosphatase 48 39 - 117 U/L    Total Bilirubin 0.5 0.2 - 1.2 mg/dL    eGFR Non African Amer 124 >60 mL/min/1.73    eGFR  African Amer >150 >60 mL/min/1.73    Globulin 3.1 gm/dL    A/G Ratio 1.3 g/dL    BUN/Creatinine Ratio 26.4 (H) 7.0 - 25.0    Anion Gap 8.0 5.0 - 15.0 mmol/L   Results for orders placed or performed in visit on 12/10/18   Vitamin D 25 hydroxy   Result Value Ref Range    25 Hydroxy, Vitamin D 37.4 30.0 - 100.0 ng/ml   CBC No Differential   Result Value Ref Range    WBC 5.54 3.20 - 9.80 10*3/mm3    RBC 4.13 3.77 - 5.16 10*6/mm3    Hemoglobin 12.7 12.0 - 15.5 g/dL    Hematocrit 37.5 35.0 - 45.0 %    MCV 90.8 80.0 - 98.0 fL    MCH 30.8 26.5 - 34.0 pg    MCHC 33.9 31.4 - 36.0 g/dL    RDW 12.4 11.5 - 14.5 %    RDW-SD 41.2 36.4 - 46.3 fl    MPV 9.6 8.0 - 12.0 fL    Platelets 272 150 - 450 10*3/mm3   TSH    Result Value Ref Range    TSH 1.320 0.460 - 4.680 mIU/mL   T4, free   Result Value Ref Range    Free T4 0.93 0.78 - 2.19 ng/dL   Iron level   Result Value Ref Range    Iron 57 37 - 170 mcg/dL   Vitamin B12   Result Value Ref Range    Vitamin B-12 644 239 - 931 pg/mL   Comprehensive metabolic panel   Result Value Ref Range    Glucose 100 60 - 100 mg/dL    BUN 17 7 - 21 mg/dL    Creatinine 0.58 0.50 - 1.00 mg/dL    Sodium 136 (L) 137 - 145 mmol/L    Potassium 4.0 3.5 - 5.1 mmol/L    Chloride 101 95 - 110 mmol/L    CO2 28.0 22.0 - 31.0 mmol/L    Calcium 8.3 (L) 8.4 - 10.2 mg/dL    Total Protein 7.0 6.3 - 8.6 g/dL    Albumin 4.20 3.40 - 4.80 g/dL    ALT (SGPT) 44 9 - 52 U/L    AST (SGOT) 56 (H) 14 - 36 U/L    Alkaline Phosphatase 47 38 - 126 U/L    Total Bilirubin 0.3 0.2 - 1.3 mg/dL    eGFR Non  Amer 112 58 - 135 mL/min/1.73    eGFR   Amer 136 (H) 58 - 135 mL/min/1.73    Globulin 2.8 2.3 - 3.5 gm/dL    A/G Ratio 1.5 1.1 - 1.8 g/dL    BUN/Creatinine Ratio 29.3 (H) 7.0 - 25.0    Anion Gap 7.0 5.0 - 15.0 mmol/L   Results for orders placed or performed in visit on 10/31/17   Hepatic Function Panel   Result Value Ref Range    Total Protein 7.3 6.3 - 8.6 g/dL    Albumin 4.10 3.40 - 4.80 g/dL    ALT (SGPT) 48 9 - 52 U/L    AST (SGOT) 59 (H) 14 - 36 U/L    Alkaline Phosphatase 44 38 - 126 U/L    Total Bilirubin 0.7 0.2 - 1.3 mg/dL    Bilirubin, Direct 0.0 0.0 - 0.3 mg/dL    Bilirubin, Indirect 0.5 0.0 - 1.1 mg/dL   Results for orders placed or performed in visit on 10/17/17   CBC No Differential   Result Value Ref Range    WBC 5.79 3.20 - 9.80 10*3/mm3    RBC 4.20 3.77 - 5.16 10*6/mm3    Hemoglobin 12.7 12.0 - 15.5 g/dL    Hematocrit 38.6 35.0 - 45.0 %    MCV 91.9 80.0 - 98.0 fL    MCH 30.2 26.5 - 34.0 pg    MCHC 32.9 31.4 - 36.0 g/dL    RDW 12.4 11.5 - 14.5 %    RDW-SD 42.0 36.4 - 46.3 fl    MPV 10.0 8.0 - 12.0 fL    Platelets 278 150 - 450 10*3/mm3     *Note: Due to a large number of results and/or encounters  for the requested time period, some results have not been displayed. A complete set of results can be found in Results Review.

## 2020-02-24 ENCOUNTER — TRANSCRIBE ORDERS (OUTPATIENT)
Dept: CT IMAGING | Facility: HOSPITAL | Age: 45
End: 2020-02-24

## 2020-02-24 DIAGNOSIS — R10.13 ABDOMINAL PAIN, EPIGASTRIC: Primary | ICD-10-CM

## 2020-02-26 ENCOUNTER — HOSPITAL ENCOUNTER (OUTPATIENT)
Dept: CT IMAGING | Facility: HOSPITAL | Age: 45
Discharge: HOME | End: 2020-02-26
Admitting: NURSE PRACTITIONER

## 2020-02-26 ENCOUNTER — LAB (OUTPATIENT)
Dept: LAB | Facility: HOSPITAL | Age: 45
End: 2020-02-26

## 2020-02-26 DIAGNOSIS — K64.9 HEMORRHOIDS, UNSPECIFIED HEMORRHOID TYPE: ICD-10-CM

## 2020-02-26 DIAGNOSIS — R10.13 EPIGASTRIC PAIN: ICD-10-CM

## 2020-02-26 DIAGNOSIS — R10.13 ABDOMINAL PAIN, EPIGASTRIC: ICD-10-CM

## 2020-02-26 DIAGNOSIS — K62.89 ANAL OR RECTAL PAIN: ICD-10-CM

## 2020-02-26 LAB — B-HCG UR QL: NEGATIVE

## 2020-02-26 PROCEDURE — 81025 URINE PREGNANCY TEST: CPT

## 2020-02-26 PROCEDURE — 25010000002 IOPAMIDOL 61 % SOLUTION: Performed by: NURSE PRACTITIONER

## 2020-02-26 PROCEDURE — 74177 CT ABD & PELVIS W/CONTRAST: CPT

## 2020-02-26 RX ADMIN — IOPAMIDOL 76 ML: 612 INJECTION, SOLUTION INTRAVENOUS at 13:00

## 2020-03-03 ENCOUNTER — OFFICE VISIT (OUTPATIENT)
Dept: GASTROENTEROLOGY | Facility: CLINIC | Age: 45
End: 2020-03-03

## 2020-03-03 VITALS
HEIGHT: 62 IN | SYSTOLIC BLOOD PRESSURE: 124 MMHG | BODY MASS INDEX: 26.24 KG/M2 | WEIGHT: 142.6 LBS | HEART RATE: 83 BPM | DIASTOLIC BLOOD PRESSURE: 81 MMHG

## 2020-03-03 DIAGNOSIS — K62.89 ANAL OR RECTAL PAIN: ICD-10-CM

## 2020-03-03 DIAGNOSIS — K64.9 HEMORRHOIDS, UNSPECIFIED HEMORRHOID TYPE: Primary | ICD-10-CM

## 2020-03-03 PROCEDURE — 99214 OFFICE O/P EST MOD 30 MIN: CPT | Performed by: NURSE PRACTITIONER

## 2020-03-03 RX ORDER — HYDROCORTISONE ACETATE 25 MG/1
25 SUPPOSITORY RECTAL NIGHTLY PRN
Qty: 30 SUPPOSITORY | Refills: 1 | Status: SHIPPED | OUTPATIENT
Start: 2020-03-03 | End: 2020-06-14

## 2020-03-03 RX ORDER — DEXTROSE AND SODIUM CHLORIDE 5; .45 G/100ML; G/100ML
30 INJECTION, SOLUTION INTRAVENOUS CONTINUOUS PRN
Status: CANCELLED | OUTPATIENT
Start: 2020-03-17

## 2020-03-03 NOTE — PROGRESS NOTES
Chief Complaint   Patient presents with   • Constipation   • Diarrhea       Subjective    Brittnee Boyd is a 45 y.o. female. she is here today for follow-up.    History of Present Illness  45-year-old female presents to discuss CT results done due to persistent rectal pain.  She underwent colonoscopy which noted small hemorrhoids.  She had ultrasound of the gallbladder appeared normal there was a small Anabelle noted on her right kidney.  She denies any reflux.  Denies any constipation reports her bowel movements are regular and denies any blood in stool.  Just reports continual rectal pain despite using Lysol.  She would like a refill of Anusol.  States pain occurs with sitting and leaning in certain positions it does not always occur with bowel movements.  CT noted small amount of pelvic free fluid.  Diffuse fatty change of liver.  Likely benign renal cyst laterally.  4.1 mm nonobstructing renal calyceal stone on the left.  No  evidence of obstructive uropathy on either side no evidence of bowel obstruction or perienteric inflammation.  Case reviewed with Dr. Lara who recommends flexible sigmoidoscopy and possible banding of hemorrhoids if possible.  Continue to use Anusol, keep bowel movements regular with increase dietary fiber.     The following portions of the patient's history were reviewed and updated as appropriate:   Past Medical History:   Diagnosis Date   • Abdominal pain, epigastric    • Acute pharyngitis    • Cough    • Diarrhea of presumed infectious origin    • Dysuria    • Epigastric pain    • Helicobacter positive gastritis    • Irregular menstrual cycle    • Irregular periods    • Onychomycosis    • Pain in right shoulder    • Pain in unspecified upper arm     BILATERAL   • Paronychia of finger    • Polyp of cervix    • Rhinitis    • Right flank pain    • Right lower quadrant pain    • Upper respiratory infection    • Urinary tract infectious disease      Past Surgical History:   Procedure Laterality  Date   •  SECTION     • COLONOSCOPY N/A 2020    Procedure: COLONOSCOPY;  Surgeon: Trey Lara MD;  Location: [facility] ENDOSCOPY;  Service: Gastroenterology   • ENDOSCOPY  2014    EGD w/ tube 47770 (Normal esophagus. Erosive in stomach. Biopsy taken. Normal duodenum. Biopsy taken.)     Family History   Problem Relation Age of Onset   • Breast cancer Neg Hx    • Ovarian cancer Neg Hx    • Uterine cancer Neg Hx    • Colon cancer Neg Hx      OB History        2    Para   2    Term   2            AB        Living   2       SAB        TAB        Ectopic        Molar        Multiple        Live Births                  Prior to Admission medications    Medication Sig Start Date End Date Taking? Authorizing Provider   hydrocortisone (ANUSOL-HC) 25 MG suppository Insert 1 suppository into the rectum At Night As Needed for Hemorrhoids (rectal discomfort/bleeding). 20   Radha Mcdonald APRN   omeprazole (priLOSEC) 40 MG capsule Take 1 capsule by mouth Daily. 20   Radha Mcdonald APRN     No Known Allergies  Social History     Socioeconomic History   • Marital status:      Spouse name: Not on file   • Number of children: Not on file   • Years of education: Not on file   • Highest education level: Not on file   Tobacco Use   • Smoking status: Never Smoker   • Smokeless tobacco: Never Used   Substance and Sexual Activity   • Alcohol use: No   • Drug use: No   • Sexual activity: Yes     Partners: Male     Birth control/protection: None       Review of Systems  Review of Systems   Constitutional: Negative for activity change, appetite change, chills, diaphoresis, fatigue, fever and unexpected weight change.   HENT: Negative for sore throat and trouble swallowing.    Respiratory: Negative for shortness of breath.    Gastrointestinal: Positive for rectal pain. Negative for abdominal distention, abdominal pain, anal bleeding, blood in stool, constipation, diarrhea, nausea and vomiting.  "  Musculoskeletal: Negative for arthralgias.   Skin: Negative for pallor.   Neurological: Negative for light-headedness.        /81 (BP Location: Left arm)   Pulse 83   Ht 157.5 cm (62\")   Wt 64.7 kg (142 lb 9.6 oz)   BMI 26.08 kg/m²     Objective    Physical Exam   Constitutional: She is oriented to person, place, and time. She appears well-developed and well-nourished. She is cooperative. No distress.   HENT:   Head: Normocephalic and atraumatic.   Neck: Normal range of motion. Neck supple. No thyromegaly present.   Cardiovascular: Normal rate, regular rhythm and normal heart sounds.   Pulmonary/Chest: Effort normal and breath sounds normal. She has no wheezes. She has no rhonchi. She has no rales.   Abdominal: Soft. Normal appearance and bowel sounds are normal. She exhibits no distension. There is no hepatosplenomegaly. There is no tenderness. There is no rigidity and no guarding. No hernia.   Lymphadenopathy:     She has no cervical adenopathy.   Neurological: She is alert and oriented to person, place, and time.   Skin: Skin is warm, dry and intact. No rash noted. No pallor.   Psychiatric: She has a normal mood and affect. Her speech is normal.     Lab on 02/26/2020   Component Date Value Ref Range Status   • HCG, Urine QL 02/26/2020 Negative  Negative Final     Assessment/Plan      1. Hemorrhoids, unspecified hemorrhoid type    2. Anal or rectal pain    .   Schedule patient for flexible sigmoidoscopy with possible hemorrhoidal banding.  Follow-up after test return office sooner if needed    Orders placed during this encounter include:  Orders Placed This Encounter   Procedures   • Follow Anesthesia Guidelines / Standing Orders     Standing Status:   Future   • Obtain Informed Consent     Standing Status:   Future     Order Specific Question:   Informed Consent Given For     Answer:   SIGMOIDOSCOPY FLEXIBLE with possible hemorrhoid banding       SIGMOIDOSCOPY FLEXIBLE with possible hemorrhoid banding " March 17 (N/A)    Review and/or summary of lab tests, radiology, procedures, medications. Review and summary of old records and obtaining of history. The risks and benefits of my recommendations, as well as other treatment options were discussed with the patient today. Questions were answered.    New Medications Ordered This Visit   Medications   • hydrocortisone (ANUSOL-HC) 25 MG suppository     Sig: Insert 1 suppository into the rectum At Night As Needed for Hemorrhoids (rectal discomfort/bleeding).     Dispense:  30 suppository     Refill:  1       Follow-up: Return in about 4 weeks (around 3/31/2020) for After test.          This document has been electronically signed by CASSY Long on March 4, 2020 2:01 PM             Results for orders placed or performed in visit on 02/26/20   Pregnancy, Urine - Urine, Clean Catch   Result Value Ref Range    HCG, Urine QL Negative Negative   Results for orders placed or performed in visit on 01/28/20   Chlamydia trachomatis, Neisseria gonorrhoeae, Trichomonas vaginalis, PCR - Swab, Vagina   Result Value Ref Range    Chlamydia DNA by PCR Negative Negative    Neisseria gonorrhoeae by PCR Negative Negative    Trichomonas vaginalis PCR Negative    Gardnerella vaginalis, Trichomonas vaginalis, Candida albicans, DNA - Swab, Vagina   Result Value Ref Range    CANDIDA ALBICANS Negative     GARDNERELLA VAGINALIS Positive     TRICHOMONAS VAGINALIS Negative    Liquid-based Pap Smear, Screening   Result Value Ref Range    Case Report       Gynecologic Cytology Report                       Case: [id number]                                  Authorizing Provider:  Feliberto Rodgers MD            Collected:           01/28/2020 03:38 PM          Ordering Location:     [facility]     Received:            01/29/2020 07:30 AM                                 [facility]                                                        First Screen:          Kenisha Harper                                                               Specimen:    Liquid-Based Pap, Screening, Cervix                                                        Interpretation Negative for intraepithelial lesion or malignancy      General Categorization Within normal limits     Other Findings Shift in ajay suggestive of bacterial vaginosis     Specimen Adequacy Satisfactory for evaluation     Additional Information       Disclaimer: Cervical cytology is a screening test primarily for squamous cancer and its precursors and has associated false-negative and false-positive results.  Technologies such as liquid-based preparations may decrease but will not eliminate all false-negative results.  Follow-up of unexplained clinical signs and symptoms is recommended to minimize false-negative results. (The [place] System for Reporting Cervical Cytology: [place], 2015).     Results for orders placed or performed in visit on 12/12/19   Vitamin D 25 hydroxy   Result Value Ref Range    25 Hydroxy, Vitamin D 21.0 (L) 30.0 - 100.0 ng/ml   CBC No Differential   Result Value Ref Range    WBC 4.56 3.40 - 10.80 10*3/mm3    RBC 4.15 3.77 - 5.28 10*6/mm3    Hemoglobin 13.0 12.0 - 15.9 g/dL    Hematocrit 38.6 34.0 - 46.6 %    MCV 93.0 79.0 - 97.0 fL    MCH 31.3 26.6 - 33.0 pg    MCHC 33.7 31.5 - 35.7 g/dL    RDW 11.5 (L) 12.3 - 15.4 %    RDW-SD 39.0 37.0 - 54.0 fl    MPV 10.0 6.0 - 12.0 fL    Platelets 280 140 - 450 10*3/mm3   Vitamin B12   Result Value Ref Range    Vitamin B-12 608 211 - 946 pg/mL   Lipid panel   Result Value Ref Range    Total Cholesterol 149 0 - 200 mg/dL    Triglycerides 69 0 - 150 mg/dL    HDL Cholesterol 61 (H) 40 - 60 mg/dL    LDL Cholesterol  74 0 - 100 mg/dL    VLDL Cholesterol 13.8 5 - 40 mg/dL    LDL/HDL Ratio 1.22    Comprehensive metabolic panel   Result Value Ref Range    Glucose 90 65 - 99 mg/dL    BUN 14 6 - 20 mg/dL    Creatinine 0.53 (L) 0.57 - 1.00 mg/dL    Sodium 139 136 - 145 mmol/L    Potassium 3.9 3.5  - 5.2 mmol/L    Chloride 102 98 - 107 mmol/L    CO2 29.0 22.0 - 29.0 mmol/L    Calcium 8.5 (L) 8.6 - 10.5 mg/dL    Total Protein 7.2 6.0 - 8.5 g/dL    Albumin 4.10 3.50 - 5.20 g/dL    ALT (SGPT) 33 1 - 33 U/L    AST (SGOT) 20 1 - 32 U/L    Alkaline Phosphatase 48 39 - 117 U/L    Total Bilirubin 0.5 0.2 - 1.2 mg/dL    eGFR Non African Amer 124 >60 mL/min/1.73    eGFR  African Amer >150 >60 mL/min/1.73    Globulin 3.1 gm/dL    A/G Ratio 1.3 g/dL    BUN/Creatinine Ratio 26.4 (H) 7.0 - 25.0    Anion Gap 8.0 5.0 - 15.0 mmol/L   Results for orders placed or performed in visit on 12/10/18   Vitamin D 25 hydroxy   Result Value Ref Range    25 Hydroxy, Vitamin D 37.4 30.0 - 100.0 ng/ml   CBC No Differential   Result Value Ref Range    WBC 5.54 3.20 - 9.80 10*3/mm3    RBC 4.13 3.77 - 5.16 10*6/mm3    Hemoglobin 12.7 12.0 - 15.5 g/dL    Hematocrit 37.5 35.0 - 45.0 %    MCV 90.8 80.0 - 98.0 fL    MCH 30.8 26.5 - 34.0 pg    MCHC 33.9 31.4 - 36.0 g/dL    RDW 12.4 11.5 - 14.5 %    RDW-SD 41.2 36.4 - 46.3 fl    MPV 9.6 8.0 - 12.0 fL    Platelets 272 150 - 450 10*3/mm3   TSH   Result Value Ref Range    TSH 1.320 0.460 - 4.680 mIU/mL   T4, free   Result Value Ref Range    Free T4 0.93 0.78 - 2.19 ng/dL   Iron level   Result Value Ref Range    Iron 57 37 - 170 mcg/dL   Vitamin B12   Result Value Ref Range    Vitamin B-12 644 239 - 931 pg/mL   Comprehensive metabolic panel   Result Value Ref Range    Glucose 100 60 - 100 mg/dL    BUN 17 7 - 21 mg/dL    Creatinine 0.58 0.50 - 1.00 mg/dL    Sodium 136 (L) 137 - 145 mmol/L    Potassium 4.0 3.5 - 5.1 mmol/L    Chloride 101 95 - 110 mmol/L    CO2 28.0 22.0 - 31.0 mmol/L    Calcium 8.3 (L) 8.4 - 10.2 mg/dL    Total Protein 7.0 6.3 - 8.6 g/dL    Albumin 4.20 3.40 - 4.80 g/dL    ALT (SGPT) 44 9 - 52 U/L    AST (SGOT) 56 (H) 14 - 36 U/L    Alkaline Phosphatase 47 38 - 126 U/L    Total Bilirubin 0.3 0.2 - 1.3 mg/dL    eGFR Non  Amer 112 58 - 135 mL/min/1.73    eGFR  African Amer 136 (H)  58 - 135 mL/min/1.73    Globulin 2.8 2.3 - 3.5 gm/dL    A/G Ratio 1.5 1.1 - 1.8 g/dL    BUN/Creatinine Ratio 29.3 (H) 7.0 - 25.0    Anion Gap 7.0 5.0 - 15.0 mmol/L   Results for orders placed or performed in visit on 10/31/17   Hepatic Function Panel   Result Value Ref Range    Total Protein 7.3 6.3 - 8.6 g/dL    Albumin 4.10 3.40 - 4.80 g/dL    ALT (SGPT) 48 9 - 52 U/L    AST (SGOT) 59 (H) 14 - 36 U/L    Alkaline Phosphatase 44 38 - 126 U/L    Total Bilirubin 0.7 0.2 - 1.3 mg/dL    Bilirubin, Direct 0.0 0.0 - 0.3 mg/dL    Bilirubin, Indirect 0.5 0.0 - 1.1 mg/dL   Results for orders placed or performed in visit on 10/17/17   CBC No Differential   Result Value Ref Range    WBC 5.79 3.20 - 9.80 10*3/mm3    RBC 4.20 3.77 - 5.16 10*6/mm3    Hemoglobin 12.7 12.0 - 15.5 g/dL    Hematocrit 38.6 35.0 - 45.0 %    MCV 91.9 80.0 - 98.0 fL    MCH 30.2 26.5 - 34.0 pg    MCHC 32.9 31.4 - 36.0 g/dL    RDW 12.4 11.5 - 14.5 %    RDW-SD 42.0 36.4 - 46.3 fl    MPV 10.0 8.0 - 12.0 fL    Platelets 278 150 - 450 10*3/mm3     *Note: Due to a large number of results and/or encounters for the requested time period, some results have not been displayed. A complete set of results can be found in Results Review.

## 2020-03-03 NOTE — H&P (VIEW-ONLY)
Chief Complaint   Patient presents with   • Constipation   • Diarrhea       Subjective    Brittnee Boyd is a 45 y.o. female. she is here today for follow-up.    History of Present Illness  45-year-old female presents to discuss CT results done due to persistent rectal pain.  She underwent colonoscopy which noted small hemorrhoids.  She had ultrasound of the gallbladder appeared normal there was a small Anabelle noted on her right kidney.  She denies any reflux.  Denies any constipation reports her bowel movements are regular and denies any blood in stool.  Just reports continual rectal pain despite using Lysol.  She would like a refill of Anusol.  States pain occurs with sitting and leaning in certain positions it does not always occur with bowel movements.  CT noted small amount of pelvic free fluid.  Diffuse fatty change of liver.  Likely benign renal cyst laterally.  4.1 mm nonobstructing renal calyceal stone on the left.  No  evidence of obstructive uropathy on either side no evidence of bowel obstruction or perienteric inflammation.  Case reviewed with Dr. Lara who recommends flexible sigmoidoscopy and possible banding of hemorrhoids if possible.  Continue to use Anusol, keep bowel movements regular with increase dietary fiber.     The following portions of the patient's history were reviewed and updated as appropriate:   Past Medical History:   Diagnosis Date   • Abdominal pain, epigastric    • Acute pharyngitis    • Cough    • Diarrhea of presumed infectious origin    • Dysuria    • Epigastric pain    • Helicobacter positive gastritis    • Irregular menstrual cycle    • Irregular periods    • Onychomycosis    • Pain in right shoulder    • Pain in unspecified upper arm     BILATERAL   • Paronychia of finger    • Polyp of cervix    • Rhinitis    • Right flank pain    • Right lower quadrant pain    • Upper respiratory infection    • Urinary tract infectious disease      Past Surgical History:   Procedure Laterality  Date   •  SECTION     • COLONOSCOPY N/A 2020    Procedure: COLONOSCOPY;  Surgeon: Trey Lara MD;  Location: [facility] ENDOSCOPY;  Service: Gastroenterology   • ENDOSCOPY  2014    EGD w/ tube 56613 (Normal esophagus. Erosive in stomach. Biopsy taken. Normal duodenum. Biopsy taken.)     Family History   Problem Relation Age of Onset   • Breast cancer Neg Hx    • Ovarian cancer Neg Hx    • Uterine cancer Neg Hx    • Colon cancer Neg Hx      OB History        2    Para   2    Term   2            AB        Living   2       SAB        TAB        Ectopic        Molar        Multiple        Live Births                  Prior to Admission medications    Medication Sig Start Date End Date Taking? Authorizing Provider   hydrocortisone (ANUSOL-HC) 25 MG suppository Insert 1 suppository into the rectum At Night As Needed for Hemorrhoids (rectal discomfort/bleeding). 20   Radha Mcdonald APRN   omeprazole (priLOSEC) 40 MG capsule Take 1 capsule by mouth Daily. 20   Radha Mcdonald APRN     No Known Allergies  Social History     Socioeconomic History   • Marital status:      Spouse name: Not on file   • Number of children: Not on file   • Years of education: Not on file   • Highest education level: Not on file   Tobacco Use   • Smoking status: Never Smoker   • Smokeless tobacco: Never Used   Substance and Sexual Activity   • Alcohol use: No   • Drug use: No   • Sexual activity: Yes     Partners: Male     Birth control/protection: None       Review of Systems  Review of Systems   Constitutional: Negative for activity change, appetite change, chills, diaphoresis, fatigue, fever and unexpected weight change.   HENT: Negative for sore throat and trouble swallowing.    Respiratory: Negative for shortness of breath.    Gastrointestinal: Positive for rectal pain. Negative for abdominal distention, abdominal pain, anal bleeding, blood in stool, constipation, diarrhea, nausea and vomiting.  "  Musculoskeletal: Negative for arthralgias.   Skin: Negative for pallor.   Neurological: Negative for light-headedness.        /81 (BP Location: Left arm)   Pulse 83   Ht 157.5 cm (62\")   Wt 64.7 kg (142 lb 9.6 oz)   BMI 26.08 kg/m²     Objective    Physical Exam   Constitutional: She is oriented to person, place, and time. She appears well-developed and well-nourished. She is cooperative. No distress.   HENT:   Head: Normocephalic and atraumatic.   Neck: Normal range of motion. Neck supple. No thyromegaly present.   Cardiovascular: Normal rate, regular rhythm and normal heart sounds.   Pulmonary/Chest: Effort normal and breath sounds normal. She has no wheezes. She has no rhonchi. She has no rales.   Abdominal: Soft. Normal appearance and bowel sounds are normal. She exhibits no distension. There is no hepatosplenomegaly. There is no tenderness. There is no rigidity and no guarding. No hernia.   Lymphadenopathy:     She has no cervical adenopathy.   Neurological: She is alert and oriented to person, place, and time.   Skin: Skin is warm, dry and intact. No rash noted. No pallor.   Psychiatric: She has a normal mood and affect. Her speech is normal.     Lab on 02/26/2020   Component Date Value Ref Range Status   • HCG, Urine QL 02/26/2020 Negative  Negative Final     Assessment/Plan      1. Hemorrhoids, unspecified hemorrhoid type    2. Anal or rectal pain    .   Schedule patient for flexible sigmoidoscopy with possible hemorrhoidal banding.  Follow-up after test return office sooner if needed    Orders placed during this encounter include:  Orders Placed This Encounter   Procedures   • Follow Anesthesia Guidelines / Standing Orders     Standing Status:   Future   • Obtain Informed Consent     Standing Status:   Future     Order Specific Question:   Informed Consent Given For     Answer:   SIGMOIDOSCOPY FLEXIBLE with possible hemorrhoid banding       SIGMOIDOSCOPY FLEXIBLE with possible hemorrhoid banding " March 17 (N/A)    Review and/or summary of lab tests, radiology, procedures, medications. Review and summary of old records and obtaining of history. The risks and benefits of my recommendations, as well as other treatment options were discussed with the patient today. Questions were answered.    New Medications Ordered This Visit   Medications   • hydrocortisone (ANUSOL-HC) 25 MG suppository     Sig: Insert 1 suppository into the rectum At Night As Needed for Hemorrhoids (rectal discomfort/bleeding).     Dispense:  30 suppository     Refill:  1       Follow-up: Return in about 4 weeks (around 3/31/2020) for After test.          This document has been electronically signed by CASSY Long on March 4, 2020 2:01 PM             Results for orders placed or performed in visit on 02/26/20   Pregnancy, Urine - Urine, Clean Catch   Result Value Ref Range    HCG, Urine QL Negative Negative   Results for orders placed or performed in visit on 01/28/20   Chlamydia trachomatis, Neisseria gonorrhoeae, Trichomonas vaginalis, PCR - Swab, Vagina   Result Value Ref Range    Chlamydia DNA by PCR Negative Negative    Neisseria gonorrhoeae by PCR Negative Negative    Trichomonas vaginalis PCR Negative    Gardnerella vaginalis, Trichomonas vaginalis, Candida albicans, DNA - Swab, Vagina   Result Value Ref Range    CANDIDA ALBICANS Negative     GARDNERELLA VAGINALIS Positive     TRICHOMONAS VAGINALIS Negative    Liquid-based Pap Smear, Screening   Result Value Ref Range    Case Report       Gynecologic Cytology Report                       Case: [id number]                                  Authorizing Provider:  Feliberto Rodgers MD            Collected:           01/28/2020 03:38 PM          Ordering Location:     [facility]     Received:            01/29/2020 07:30 AM                                 [facility]                                                        First Screen:          Kenisha Harper                                                               Specimen:    Liquid-Based Pap, Screening, Cervix                                                        Interpretation Negative for intraepithelial lesion or malignancy      General Categorization Within normal limits     Other Findings Shift in ajay suggestive of bacterial vaginosis     Specimen Adequacy Satisfactory for evaluation     Additional Information       Disclaimer: Cervical cytology is a screening test primarily for squamous cancer and its precursors and has associated false-negative and false-positive results.  Technologies such as liquid-based preparations may decrease but will not eliminate all false-negative results.  Follow-up of unexplained clinical signs and symptoms is recommended to minimize false-negative results. (The [place] System for Reporting Cervical Cytology: [place], 2015).     Results for orders placed or performed in visit on 12/12/19   Vitamin D 25 hydroxy   Result Value Ref Range    25 Hydroxy, Vitamin D 21.0 (L) 30.0 - 100.0 ng/ml   CBC No Differential   Result Value Ref Range    WBC 4.56 3.40 - 10.80 10*3/mm3    RBC 4.15 3.77 - 5.28 10*6/mm3    Hemoglobin 13.0 12.0 - 15.9 g/dL    Hematocrit 38.6 34.0 - 46.6 %    MCV 93.0 79.0 - 97.0 fL    MCH 31.3 26.6 - 33.0 pg    MCHC 33.7 31.5 - 35.7 g/dL    RDW 11.5 (L) 12.3 - 15.4 %    RDW-SD 39.0 37.0 - 54.0 fl    MPV 10.0 6.0 - 12.0 fL    Platelets 280 140 - 450 10*3/mm3   Vitamin B12   Result Value Ref Range    Vitamin B-12 608 211 - 946 pg/mL   Lipid panel   Result Value Ref Range    Total Cholesterol 149 0 - 200 mg/dL    Triglycerides 69 0 - 150 mg/dL    HDL Cholesterol 61 (H) 40 - 60 mg/dL    LDL Cholesterol  74 0 - 100 mg/dL    VLDL Cholesterol 13.8 5 - 40 mg/dL    LDL/HDL Ratio 1.22    Comprehensive metabolic panel   Result Value Ref Range    Glucose 90 65 - 99 mg/dL    BUN 14 6 - 20 mg/dL    Creatinine 0.53 (L) 0.57 - 1.00 mg/dL    Sodium 139 136 - 145 mmol/L    Potassium 3.9 3.5  - 5.2 mmol/L    Chloride 102 98 - 107 mmol/L    CO2 29.0 22.0 - 29.0 mmol/L    Calcium 8.5 (L) 8.6 - 10.5 mg/dL    Total Protein 7.2 6.0 - 8.5 g/dL    Albumin 4.10 3.50 - 5.20 g/dL    ALT (SGPT) 33 1 - 33 U/L    AST (SGOT) 20 1 - 32 U/L    Alkaline Phosphatase 48 39 - 117 U/L    Total Bilirubin 0.5 0.2 - 1.2 mg/dL    eGFR Non African Amer 124 >60 mL/min/1.73    eGFR  African Amer >150 >60 mL/min/1.73    Globulin 3.1 gm/dL    A/G Ratio 1.3 g/dL    BUN/Creatinine Ratio 26.4 (H) 7.0 - 25.0    Anion Gap 8.0 5.0 - 15.0 mmol/L   Results for orders placed or performed in visit on 12/10/18   Vitamin D 25 hydroxy   Result Value Ref Range    25 Hydroxy, Vitamin D 37.4 30.0 - 100.0 ng/ml   CBC No Differential   Result Value Ref Range    WBC 5.54 3.20 - 9.80 10*3/mm3    RBC 4.13 3.77 - 5.16 10*6/mm3    Hemoglobin 12.7 12.0 - 15.5 g/dL    Hematocrit 37.5 35.0 - 45.0 %    MCV 90.8 80.0 - 98.0 fL    MCH 30.8 26.5 - 34.0 pg    MCHC 33.9 31.4 - 36.0 g/dL    RDW 12.4 11.5 - 14.5 %    RDW-SD 41.2 36.4 - 46.3 fl    MPV 9.6 8.0 - 12.0 fL    Platelets 272 150 - 450 10*3/mm3   TSH   Result Value Ref Range    TSH 1.320 0.460 - 4.680 mIU/mL   T4, free   Result Value Ref Range    Free T4 0.93 0.78 - 2.19 ng/dL   Iron level   Result Value Ref Range    Iron 57 37 - 170 mcg/dL   Vitamin B12   Result Value Ref Range    Vitamin B-12 644 239 - 931 pg/mL   Comprehensive metabolic panel   Result Value Ref Range    Glucose 100 60 - 100 mg/dL    BUN 17 7 - 21 mg/dL    Creatinine 0.58 0.50 - 1.00 mg/dL    Sodium 136 (L) 137 - 145 mmol/L    Potassium 4.0 3.5 - 5.1 mmol/L    Chloride 101 95 - 110 mmol/L    CO2 28.0 22.0 - 31.0 mmol/L    Calcium 8.3 (L) 8.4 - 10.2 mg/dL    Total Protein 7.0 6.3 - 8.6 g/dL    Albumin 4.20 3.40 - 4.80 g/dL    ALT (SGPT) 44 9 - 52 U/L    AST (SGOT) 56 (H) 14 - 36 U/L    Alkaline Phosphatase 47 38 - 126 U/L    Total Bilirubin 0.3 0.2 - 1.3 mg/dL    eGFR Non  Amer 112 58 - 135 mL/min/1.73    eGFR  African Amer 136 (H)  58 - 135 mL/min/1.73    Globulin 2.8 2.3 - 3.5 gm/dL    A/G Ratio 1.5 1.1 - 1.8 g/dL    BUN/Creatinine Ratio 29.3 (H) 7.0 - 25.0    Anion Gap 7.0 5.0 - 15.0 mmol/L   Results for orders placed or performed in visit on 10/31/17   Hepatic Function Panel   Result Value Ref Range    Total Protein 7.3 6.3 - 8.6 g/dL    Albumin 4.10 3.40 - 4.80 g/dL    ALT (SGPT) 48 9 - 52 U/L    AST (SGOT) 59 (H) 14 - 36 U/L    Alkaline Phosphatase 44 38 - 126 U/L    Total Bilirubin 0.7 0.2 - 1.3 mg/dL    Bilirubin, Direct 0.0 0.0 - 0.3 mg/dL    Bilirubin, Indirect 0.5 0.0 - 1.1 mg/dL   Results for orders placed or performed in visit on 10/17/17   CBC No Differential   Result Value Ref Range    WBC 5.79 3.20 - 9.80 10*3/mm3    RBC 4.20 3.77 - 5.16 10*6/mm3    Hemoglobin 12.7 12.0 - 15.5 g/dL    Hematocrit 38.6 35.0 - 45.0 %    MCV 91.9 80.0 - 98.0 fL    MCH 30.2 26.5 - 34.0 pg    MCHC 32.9 31.4 - 36.0 g/dL    RDW 12.4 11.5 - 14.5 %    RDW-SD 42.0 36.4 - 46.3 fl    MPV 10.0 8.0 - 12.0 fL    Platelets 278 150 - 450 10*3/mm3     *Note: Due to a large number of results and/or encounters for the requested time period, some results have not been displayed. A complete set of results can be found in Results Review.

## 2020-03-17 ENCOUNTER — ANESTHESIA (OUTPATIENT)
Dept: GASTROENTEROLOGY | Facility: HOSPITAL | Age: 45
End: 2020-03-17

## 2020-03-17 ENCOUNTER — ANESTHESIA EVENT (OUTPATIENT)
Dept: GASTROENTEROLOGY | Facility: HOSPITAL | Age: 45
End: 2020-03-17

## 2020-03-17 ENCOUNTER — HOSPITAL ENCOUNTER (OUTPATIENT)
Facility: HOSPITAL | Age: 45
Setting detail: HOSPITAL OUTPATIENT SURGERY
Discharge: HOME | End: 2020-03-17
Attending: INTERNAL MEDICINE | Admitting: INTERNAL MEDICINE

## 2020-03-17 VITALS
BODY MASS INDEX: 25.11 KG/M2 | TEMPERATURE: 97.4 F | SYSTOLIC BLOOD PRESSURE: 107 MMHG | OXYGEN SATURATION: 99 % | HEIGHT: 62 IN | RESPIRATION RATE: 18 BRPM | WEIGHT: 136.47 LBS | HEART RATE: 67 BPM | DIASTOLIC BLOOD PRESSURE: 64 MMHG

## 2020-03-17 DIAGNOSIS — K64.9 HEMORRHOIDS, UNSPECIFIED HEMORRHOID TYPE: ICD-10-CM

## 2020-03-17 PROCEDURE — 25010000002 PROPOFOL 10 MG/ML EMULSION: Performed by: NURSE ANESTHETIST, CERTIFIED REGISTERED

## 2020-03-17 PROCEDURE — 25010000002 FENTANYL CITRATE (PF) 100 MCG/2ML SOLUTION: Performed by: NURSE ANESTHETIST, CERTIFIED REGISTERED

## 2020-03-17 PROCEDURE — 45350 SGMDSC W/BAND LIGATION: CPT | Performed by: INTERNAL MEDICINE

## 2020-03-17 RX ORDER — PROMETHAZINE HYDROCHLORIDE 25 MG/1
25 TABLET ORAL ONCE AS NEEDED
Status: DISCONTINUED | OUTPATIENT
Start: 2020-03-17 | End: 2020-03-17 | Stop reason: HOSPADM

## 2020-03-17 RX ORDER — PROMETHAZINE HYDROCHLORIDE 25 MG/ML
12.5 INJECTION, SOLUTION INTRAMUSCULAR; INTRAVENOUS ONCE AS NEEDED
Status: DISCONTINUED | OUTPATIENT
Start: 2020-03-17 | End: 2020-03-17 | Stop reason: HOSPADM

## 2020-03-17 RX ORDER — ONDANSETRON 2 MG/ML
4 INJECTION INTRAMUSCULAR; INTRAVENOUS ONCE AS NEEDED
Status: DISCONTINUED | OUTPATIENT
Start: 2020-03-17 | End: 2020-03-17 | Stop reason: HOSPADM

## 2020-03-17 RX ORDER — LIDOCAINE HYDROCHLORIDE 20 MG/ML
INJECTION, SOLUTION EPIDURAL; INFILTRATION; INTRACAUDAL; PERINEURAL AS NEEDED
Status: DISCONTINUED | OUTPATIENT
Start: 2020-03-17 | End: 2020-03-17 | Stop reason: SURG

## 2020-03-17 RX ORDER — PROMETHAZINE HYDROCHLORIDE 25 MG/1
25 SUPPOSITORY RECTAL ONCE AS NEEDED
Status: DISCONTINUED | OUTPATIENT
Start: 2020-03-17 | End: 2020-03-17 | Stop reason: HOSPADM

## 2020-03-17 RX ORDER — FENTANYL CITRATE 50 UG/ML
INJECTION, SOLUTION INTRAMUSCULAR; INTRAVENOUS AS NEEDED
Status: DISCONTINUED | OUTPATIENT
Start: 2020-03-17 | End: 2020-03-17 | Stop reason: SURG

## 2020-03-17 RX ORDER — DEXTROSE AND SODIUM CHLORIDE 5; .45 G/100ML; G/100ML
30 INJECTION, SOLUTION INTRAVENOUS CONTINUOUS PRN
Status: DISCONTINUED | OUTPATIENT
Start: 2020-03-17 | End: 2020-03-17 | Stop reason: HOSPADM

## 2020-03-17 RX ORDER — MEPERIDINE HYDROCHLORIDE 25 MG/ML
12.5 INJECTION INTRAMUSCULAR; INTRAVENOUS; SUBCUTANEOUS
Status: DISCONTINUED | OUTPATIENT
Start: 2020-03-17 | End: 2020-03-17 | Stop reason: HOSPADM

## 2020-03-17 RX ORDER — PROPOFOL 10 MG/ML
VIAL (ML) INTRAVENOUS AS NEEDED
Status: DISCONTINUED | OUTPATIENT
Start: 2020-03-17 | End: 2020-03-17 | Stop reason: SURG

## 2020-03-17 RX ADMIN — LIDOCAINE HYDROCHLORIDE 50 MG: 20 INJECTION, SOLUTION EPIDURAL; INFILTRATION; INTRACAUDAL; PERINEURAL at 11:09

## 2020-03-17 RX ADMIN — DEXTROSE AND SODIUM CHLORIDE 30 ML/HR: 5; 450 INJECTION, SOLUTION INTRAVENOUS at 10:58

## 2020-03-17 RX ADMIN — PROPOFOL 100 MG: 10 INJECTION, EMULSION INTRAVENOUS at 11:09

## 2020-03-17 RX ADMIN — PROPOFOL 100 MG: 10 INJECTION, EMULSION INTRAVENOUS at 11:19

## 2020-03-17 RX ADMIN — FENTANYL CITRATE 100 MCG: 50 INJECTION, SOLUTION INTRAMUSCULAR; INTRAVENOUS at 11:24

## 2020-03-17 NOTE — ANESTHESIA PREPROCEDURE EVALUATION
Anesthesia Evaluation     Patient summary reviewed and Nursing notes reviewed                Airway   No difficulty expected  Dental      Pulmonary - normal exam   (+) recent URI,   Cardiovascular - negative cardio ROS and normal exam        Neuro/Psych- negative ROS  GI/Hepatic/Renal/Endo - negative ROS     Musculoskeletal     Abdominal  - normal exam   Substance History - negative use     OB/GYN negative ob/gyn ROS         Other   arthritis,                      Anesthesia Plan    ASA 1     MAC     intravenous induction     Anesthetic plan, all risks, benefits, and alternatives have been provided, discussed and informed consent has been obtained with: patient.    Plan discussed with CRNA.

## 2020-03-17 NOTE — ANESTHESIA POSTPROCEDURE EVALUATION
Patient: Brittnee Boyd    Procedure Summary     Date:  03/17/20 Room / Location:  [facility] ENDOSCOPY 1 / [facility] ENDOSCOPY    Anesthesia Start:  1101 Anesthesia Stop:  1123    Procedure:  SIGMOIDOSCOPY FLEXIBLE with possible hemorrhoid banding March 17 (N/A ) Diagnosis:       Hemorrhoids, unspecified hemorrhoid type      (Hemorrhoids, unspecified hemorrhoid type [K64.9])    Surgeon:  Trey Lara MD Provider:  Jean Brenner CRNA    Anesthesia Type:  MAC ASA Status:  1          Anesthesia Type: MAC    Vitals  No vitals data found for the desired time range.          Post Anesthesia Care and Evaluation    Patient location during evaluation: PACU  Patient participation: complete - patient participated  Level of consciousness: awake and alert  Pain score: 1  Pain management: adequate  Airway patency: patent  Anesthetic complications: No anesthetic complications  PONV Status: none  Cardiovascular status: acceptable  Respiratory status: acceptable  Hydration status: acceptable  Post Neuraxial Block status: Motor and sensory function returned to baseline

## 2020-04-21 ENCOUNTER — OFFICE VISIT (OUTPATIENT)
Dept: GASTROENTEROLOGY | Facility: CLINIC | Age: 45
End: 2020-04-21

## 2020-04-21 VITALS
BODY MASS INDEX: 25.91 KG/M2 | DIASTOLIC BLOOD PRESSURE: 67 MMHG | HEART RATE: 76 BPM | SYSTOLIC BLOOD PRESSURE: 104 MMHG | WEIGHT: 140.8 LBS | HEIGHT: 62 IN

## 2020-04-21 DIAGNOSIS — R10.30 LOWER ABDOMINAL PAIN: ICD-10-CM

## 2020-04-21 DIAGNOSIS — R19.4 CHANGE IN BOWEL HABITS: Primary | ICD-10-CM

## 2020-04-21 PROCEDURE — 99213 OFFICE O/P EST LOW 20 MIN: CPT | Performed by: INTERNAL MEDICINE

## 2020-04-21 RX ORDER — DICYCLOMINE HCL 20 MG
20 TABLET ORAL EVERY 6 HOURS
Qty: 90 TABLET | Refills: 5 | Status: SHIPPED | OUTPATIENT
Start: 2020-04-21 | End: 2020-05-21

## 2020-04-21 NOTE — PATIENT INSTRUCTIONS
--------------------------------------------------------------------------------------------------------------  [facility]    Monday - Friday 8 a.m. - 8 p.m.    Saturday - Sunday 8 a.m. - 4 p.m.    ----------------  [email or web address]  See current wait times for [facility] in real-time!  Reserve your waiting-room spot in line!   Receive text/email messages if our wait times are long,  so that you can do your waiting at home or work, instead of in our waiting room.     Note: This system does not guarantee an \"appointment time\" to see a provider. Also, patients may be called out of the waiting room \"ahead of turn\" in emergency situations, at discretion of Urgent Care staff.    ----------------    Thank you for choosing [facility].    We hope you had a pleasant experience and we look forward to serving your future needs.   If you receive a survey in the mail about today's services, we hope that you will take a few minutes to let us know about your experience.    · If you have any questions about your VISIT, please call [phone]    · If you have any questions about your BILL, please call [phone].    · If you need a copy of your MEDICAL RECORD, please call [phone]    --------------------------------------------------------------------------------------------------------------  UNLESS OTHERWISE INSTRUCTED BY YOUR URGENT CARE PROVIDER TODAY, all follow-up for your medical issues should be managed by your primary care provider.  The Urgent Care does not manage chronic medical issues or refill medications.  You are responsible for scheduling and keeping any necessary follow-up visits with your primary care provider after this visit today.   --------------------------------------------------------------------------------------------------------------    IF YOU HAVE LAB RESULTS or XRAY REPORTS STILL PENDING: We typically call patients back only if (1) the  "BMI for Adults    Body mass index (BMI) is a number that is calculated from a person's weight and height. BMI may help to estimate how much of a person's weight is composed of fat. BMI can help identify those who may be at higher risk for certain medical problems.  How is BMI used with adults?  BMI is used as a screening tool to identify possible weight problems. It is used to check whether a person is obese, overweight, healthy weight, or underweight.  How is BMI calculated?  BMI measures your weight and compares it to your height. This can be done either in English ([place] or metric measurements. Note that charts are available to help you find your BMI quickly and easily without having to do these calculations yourself.  To calculate your BMI in English ([place] measurements, your health care provider will:  1. Measure your weight in pounds (lb).  2. Multiply the number of pounds by 703.  ? For example, for a person who weighs 180 lb, multiply that number by 703, which equals 126,540.  3. Measure your height in inches (in). Then multiply that number by itself to get a measurement called \"inches squared.\"  ? For example, for a person who is 70 in tall, the \"inches squared\" measurement is 70 in x 70 in, which equals 4900 inches squared.  4. Divide the total from Step 2 (number of lb x 703) by the total from Step 3 (inches squared): 126,540 ÷ 4900 = 25.8. This is your BMI.  To calculate your BMI in metric measurements, your health care provider will:  1. Measure your weight in kilograms (kg).  2. Measure your height in meters (m). Then multiply that number by itself to get a measurement called \"meters squared.\"  ? For example, for a person who is 1.75 m tall, the \"meters squared\" measurement is 1.75 m x 1.75 m, which is equal to 3.1 meters squared.  3. Divide the number of kilograms (your weight) by the meters squared number. In this example: 70 ÷ 3.1 = 22.6. This is your BMI.  How is BMI interpreted?  To interpret your " results are \"significantly abnormal\", (2) we need to change your treatment plan based on the results, or (3) the report is different than what we told you during your visit. If we have not called you about your results 48 hours after your visit, you can call us at [phone] to check on the status.  --------------------------------------------------------------------------------------------------------------                              Follow up with your primary care physician in 3-4 days with continued symptoms or worsening condition; sooner with immediate concerns.    We recommend symptom specific over-the-counter treatments as needed; decongestants, cough medications, and fever control.    For fever and pain you can give ibuprofen and Tylenol every 6-8 hours:    FEVER/PAIN RELIEVER DOSING CHART for CHILDREN  The following tables refer to children's Motrin, Advil, Tylenol, and acetaminophen; look for any other ingredients on the bottles (as might be found in multi-symptom or combination medications) and ask if you are unsure about the dosing regimen.      IBUPROFEN  (Motrin/Advil) DOSING  (every 6-8 hours)  WEIGHT  AGE  INFANT DROPS  SUSPENSION  SUSPENSION CHEWABLES CHEWABLES   Strength    50 MG/ 1.25 ml  100 mg/5 ml    5 ml = (1 tsp)  160 mg/5 ml  50 mg   chewable 100 mg  chewable    0-6 Mo Do not give if under 6 mo!        12 -17  lbs  6-11 MO  1.25 ml  2.5 ml  1.75 ml      18 - 23 lbs  2 - 23 mo  1.875 ml     3.75 ml  2.5 ml      24 - 35 lbs  2 - 3 yr   2.5 ml    5 ml   3.75 ml  2 tabs   1 tab    36 - 47 lbs  4 - 5   NA  7.5 ml  5 ml  3 tabs   1.5 tabs    48 - 59 lbs   6 - 8 yr   NA    10 ml  7.5 ml  4 tabs  2 tabs    60 - 71 lbs  9 -10 yr  NA   12.5 ml  10 ml   2.5  tabs    72 - 95 lbs  11 yr     NA      15 ml  12.5 ml   3 tabs     ACETAMINOPHEN (tylenol) DOSING (every 4 - 6 hours)   WEIGHT  AGE  SUSPENSION CHEWABLES  CHEWABLES    Strength    160 mg/5cc 80mg   160mg     Pounds 0-2 mo Call office      6 -11  results, your health care provider will use BMI charts to identify whether you are underweight, normal weight, overweight, or obese. The following guidelines will be used:  · Underweight: BMI less than 18.5.  · Normal weight: BMI between 18.5 and 24.9.  · Overweight: BMI between 25 and 29.9.  · Obese: BMI of 30 and above.  Please note:  · Weight includes both fat and muscle, so someone with a muscular build, such as an athlete, may have a BMI that is higher than 24.9. In cases like these, BMI is not an accurate measure of body fat.  · To determine if excess body fat is the cause of a BMI of 25 or higher, further assessments may need to be done by a health care provider.  · BMI is usually interpreted in the same way for men and women.  Why is BMI a useful tool?  BMI is useful in two ways:  · Identifying a weight problem that may be related to a medical condition, or that may increase the risk for medical problems.  · Promoting lifestyle and diet changes in order to reach a healthy weight.  Summary  · Body mass index (BMI) is a number that is calculated from a person's weight and height.  · BMI may help to estimate how much of a person's weight is composed of fat. BMI can help identify those who may be at higher risk for certain medical problems.  · BMI can be measured using English measurements or metric measurements.  · To interpret your results, your health care provider will use BMI charts to identify whether you are underweight, normal weight, overweight, or obese.  This information is not intended to replace advice given to you by your health care provider. Make sure you discuss any questions you have with your health care provider.  Document Released: 08/29/2005 Document Revised: 10/31/2018 Document Reviewed: 10/31/2018  [employer] Patient Education © 2020 [employer]     lbs  2-6 mo 1mL      12 - 17 lbs  6 - 11 mo  2.5 mL      18 - 23 lbs  12 - 23 mo  3.75 mL      24 - 35 lbs  2 - 3 yr  5 mL  2 tabs 1 tab    36 - 47 lbs  4 - 5 yr  7.5 mL  3 tabs  1.5 tabs    48 - 59 lbs  6 - 8 yr  10 mL  4 tabs  2 tabs    60 - 71 lbs  9 -10 yr  12.5 mL  5 tabs  2.5 tabs    72 - 95 lbs  11 yr  15 mL  6 tabs  3 tabs    > 95 lbs  > 13 yr    4 tabs                                                           How to Give Acetaminophen (Tylenol) and Ibuprofen (Motrin/Advil)    Alternate Acetaminophen and Ibuprofen every 4 hours                               OR  Give BOTH together every 6 hours  (if you give both at the same time, you must wait 6 hours before starting to alternate again)    Viral respiratory infection (common cold or bronchitis)  What causes cough, runny nose, and other symptoms of the common cold? -- These symptoms are usually caused by a viral infection. Lots of viruses can take hold inside your nose, mouth, throat, or lungs, and cause cold symptoms.   These symptoms are caused by a virus and antibiotics will not be helpful; antibiotics treat bacterial infections.  Viruses are very common and will get better with supportive home treatments.    Most people get over a cold without lasting problems. Even so, having a cold can be uncomfortable.     What are the symptoms of the common cold? -- The symptoms include:    ?Sneezing    ?Coughing    ?Sniffling and runny nose    ?Sore throat    ?Chest congestion    In children, the common cold can also cause a fever. But adults do not usually get a fever when they have a cold.    How can I tell if I have a cold or the flu? -- The common cold and the flu both cause many of the same symptoms. But they also have some important differences.  The main difference is a fever (usually over 101 degrees) and more severe symptoms.  This too is a virus, but can be tested for by your physician.    What should I know if my child has a cold? -- In children, the  common cold is often more severe than it is in adults. It also lasts longer. Plus, children often get a fever during the first 3 days of a cold.  Delsym for children is a recommended cough medicine for pediatric patients.    When should I call the doctor or nurse? -- Most people who have a cold do not need to see the doctor or nurse. But you should call your doctor or nurse if you have:    ?A fever of more than 100.4º F (38º C) that comes with shaking chills, loss of appetite, or trouble breathing    ?A fever and also have lung disease, such as emphysema    ?A cough that lasts longer than 10 days    ?Chest pain when you cough, trouble breathing, or coughing up blood    Take your child to the emergency room if he or she:    ?Becomes confused or stops responding to you    ?Has trouble breathing or has to work hard to breathe    Call your child's doctor or nurse if he or she:    ?Refuses to drink anything for a long time    ?Is younger than 3 months    ?Has a fever and is not acting like him- or herself    ?Has a stuffed or runny nose that gets worse or does not get better after 2 weeks    ?Has red eyes or yellow goop coming out of his or her eyes    ?Has ear pain, pulls at his or her ears, or shows other signs of having an ear infection      What can I do to feel better? -- If you are an adult, you can try cough and cold medicines that you can get without a prescription. These medicines might help with your symptoms. But they won't cure your cold, or help you get well faster.  Humidifiers in your home can help; dry air is irritating to the airways.  Topical treatments like Vicks Vapor rub (on young children, rub on the bottoms of their feet) or eucalyptus oil.  A warm moist compress on their chest or throat can help as well.    If you decide to try nonprescription cold medicines, be sure to follow the directions on the label. Do not combine 2 or more medicines that have acetaminophen in them. If you take too much  acetaminophen, the drug can damage your liver. Also, if you have a heart condition, or you take prescription medicines, ask your pharmacist if it is safe to take the cold medicine you have in mind.    Are cough and cold medicines safe for children? -- If your child is younger than 6, you should NOT give him or her any cold medicines. These medicines are not safe for young children. Even if your child is older than 6, cough and cold medicines are unlikely to help.    We recommend:   -Delsym cough medicine  -Saline nasal sprays to clear congestion  -[employer] Cold Care products (these are all natural and can help shorten the duration and severity of your symptoms)  -Tylenol and/or Ibuprofen for pain and/or fever (see dosing table below)      How long will I be sick? -- Colds usually last over 10 days in children, but people at any age can have symptoms for up to 2 weeks.    Can the common cold lead to more serious problems? -- In very few cases, yes. In some people having a cold can lead to:    ?Pneumonia or bronchitis (infections of the lungs), rare in children    ?Ear infections (in children)    ?Other infections like sinus infections- this is typically rare and after several weeks of symptoms.    How can I keep from getting another cold? -- The most important thing you can do is to wash your hands often with soap and water. Alcohol hand rubs work well, too. The germs that cause the common cold can live on tables, door handles, and other surfaces for at least 2 hours. You never know when you might be touching germs. That's why it's so important to clean your hands often.

## 2020-04-21 NOTE — PROGRESS NOTES
Chief Complaint   Patient presents with   • Endoscpy Follow-up       Subjective    Brittnee Boyd is a 45 y.o. female.    History of Present Illness  Patient presented to the GI clinic for follow-up visit today.  Conversation was performed using an  assistance by video for Mandarin.  Patient has intermittent left lower quadrant pain associated with bloating and diarrhea for past few weeks.  Denied rectal bleeding, rectal pain or weight loss.  Completed suppositories for hemorrhoidal banding.  Most recent hemoglobin was 12.7.       The following portions of the patient's history were reviewed and updated as appropriate:   Past Medical History:   Diagnosis Date   • Abdominal pain, epigastric    • Acute pharyngitis    • Cough    • Diarrhea of presumed infectious origin    • Dysuria    • Epigastric pain    • Helicobacter positive gastritis    • Irregular menstrual cycle    • Irregular periods    • Onychomycosis    • Pain in right shoulder    • Pain in unspecified upper arm     BILATERAL   • Paronychia of finger    • Polyp of cervix    • Rhinitis    • Right flank pain    • Right lower quadrant pain    • Upper respiratory infection    • Urinary tract infectious disease      Past Surgical History:   Procedure Laterality Date   •  SECTION     • COLONOSCOPY N/A 2020    Procedure: COLONOSCOPY;  Surgeon: Trey Lara MD;  Location: [facility] ENDOSCOPY;  Service: Gastroenterology   • ENDOSCOPY  2014    EGD w/ tube 52612 (Normal esophagus. Erosive in stomach. Biopsy taken. Normal duodenum. Biopsy taken.)   • SIGMOIDOSCOPY N/A 3/17/2020    Procedure: SIGMOIDOSCOPY FLEXIBLE with possible hemorrhoid banding ;  Surgeon: Trey Lara MD;  Location: [facility] ENDOSCOPY;  Service: Gastroenterology;  Laterality: N/A;  banding x 6   hemmroids     Family History   Problem Relation Age of Onset   • Breast cancer Neg Hx    • Ovarian cancer Neg Hx    • Uterine cancer Neg Hx    • Colon cancer Neg Hx       OB History        2    Para   2    Term   2            AB        Living   2       SAB        TAB        Ectopic        Molar        Multiple        Live Births                  Prior to Admission medications    Medication Sig Start Date End Date Taking? Authorizing Provider   hydrocortisone (ANUSOL-HC) 25 MG suppository Insert 1 suppository into the rectum At Night As Needed for Hemorrhoids (rectal discomfort/bleeding). 3/3/20   Radha Mcdonald APRN   omeprazole (priLOSEC) 40 MG capsule Take 1 capsule by mouth Daily. 20   Radha Mcdonald APRN     No Known Allergies  Social History     Socioeconomic History   • Marital status:      Spouse name: Not on file   • Number of children: Not on file   • Years of education: Not on file   • Highest education level: Not on file   Tobacco Use   • Smoking status: Never Smoker   • Smokeless tobacco: Never Used   Substance and Sexual Activity   • Alcohol use: No   • Drug use: No   • Sexual activity: Yes     Partners: Male     Birth control/protection: None       Review of Systems  Review of Systems   Constitutional: Negative for chills, fatigue, fever and unexpected weight change.   HENT: Negative for congestion, ear discharge, hearing loss, nosebleeds and sore throat.    Eyes: Negative for pain, discharge and redness.   Respiratory: Negative for cough, chest tightness, shortness of breath and wheezing.    Cardiovascular: Negative for chest pain and palpitations.   Gastrointestinal: Positive for abdominal pain and diarrhea. Negative for abdominal distention, blood in stool, constipation, nausea and vomiting.   Endocrine: Negative for cold intolerance, polydipsia, polyphagia and polyuria.   Genitourinary: Negative for dysuria, flank pain, frequency, hematuria and urgency.   Musculoskeletal: Negative for arthralgias, back pain, joint swelling and myalgias.   Skin: Negative for color change, pallor and rash.   Neurological: Negative for tremors, seizures,  "syncope, weakness and headaches.   Hematological: Negative for adenopathy. Does not bruise/bleed easily.   Psychiatric/Behavioral: Negative for behavioral problems, confusion, dysphoric mood, hallucinations and suicidal ideas. The patient is not nervous/anxious.         /67 (BP Location: Left arm, Patient Position: Sitting)   Pulse 76   Ht 157.5 cm (62\")   Wt 63.9 kg (140 lb 12.8 oz)   LMP 04/17/2020   BMI 25.75 kg/m²     Objective    Physical Exam   Constitutional: She is oriented to person, place, and time. She appears well-developed and well-nourished.   HENT:   Head: Normocephalic and atraumatic.   Mouth/Throat: Oropharynx is clear and moist.   Eyes: Pupils are equal, round, and reactive to light. Conjunctivae and EOM are normal.   Neck: Normal range of motion. Neck supple. No thyromegaly present.   Cardiovascular: Normal rate, regular rhythm and normal heart sounds.   No murmur heard.  Pulmonary/Chest: Effort normal and breath sounds normal. She has no wheezes.   Abdominal: Soft. Bowel sounds are normal. She exhibits no distension and no mass. There is no tenderness. No hernia.   Genitourinary:   Genitourinary Comments: No lesions noted   Musculoskeletal: Normal range of motion. She exhibits no edema or tenderness.   Lymphadenopathy:     She has no cervical adenopathy.   Neurological: She is alert and oriented to person, place, and time. No cranial nerve deficit.   Skin: Skin is warm and dry. No rash noted.   Psychiatric: She has a normal mood and affect. Thought content normal.     Lab on 02/26/2020   Component Date Value Ref Range Status   • HCG, Urine QL 02/26/2020 Negative  Negative Final     Assessment/Plan    No diagnosis found..   1.  Abdominal pain with bloating and diarrhea likely due to irritable bowel syndrome.  Add Bentyl 20 mg p.o. 4 times daily.  High-fiber diet.  2.  Hemorrhoidal bleeding and discomfort, resolved post banding.    Orders placed during this encounter include:  No orders " of the defined types were placed in this encounter.      * Surgery not found *    Review and/or summary of lab tests, radiology, procedures, medications. Review and summary of old records and obtaining of history. The risks and benefits of my recommendations, as well as other treatment options were discussed with the patient and  today. Questions were answered.    No orders of the defined types were placed in this encounter.      Follow-up: No follow-ups on file.               Results for orders placed or performed in visit on 02/26/20   Pregnancy, Urine - Urine, Clean Catch   Result Value Ref Range    HCG, Urine QL Negative Negative   Results for orders placed or performed in visit on 01/28/20   Chlamydia trachomatis, Neisseria gonorrhoeae, Trichomonas vaginalis, PCR - Swab, Vagina   Result Value Ref Range    Chlamydia DNA by PCR Negative Negative    Neisseria gonorrhoeae by PCR Negative Negative    Trichomonas vaginalis PCR Negative    Gardnerella vaginalis, Trichomonas vaginalis, Candida albicans, DNA - Swab, Vagina   Result Value Ref Range    CANDIDA ALBICANS Negative     GARDNERELLA VAGINALIS Positive     TRICHOMONAS VAGINALIS Negative    Liquid-based Pap Smear, Screening   Result Value Ref Range    Case Report       Gynecologic Cytology Report                       Case: [id number]                                  Authorizing Provider:  Feliberto Rodgers MD            Collected:           01/28/2020 03:38 PM          Ordering Location:     [facility]     Received:            01/29/2020 07:30 AM                                 [facility]                                                        First Screen:          Kenisha Harper                                                              Specimen:    Liquid-Based Pap, Screening, Cervix                                                        Interpretation Negative for intraepithelial lesion or malignancy      General Categorization  Within normal limits     Other Findings Shift in ajay suggestive of bacterial vaginosis     Specimen Adequacy Satisfactory for evaluation     Additional Information       Disclaimer: Cervical cytology is a screening test primarily for squamous cancer and its precursors and has associated false-negative and false-positive results.  Technologies such as liquid-based preparations may decrease but will not eliminate all false-negative results.  Follow-up of unexplained clinical signs and symptoms is recommended to minimize false-negative results. (The [place] System for Reporting Cervical Cytology: [place], 2015).     Results for orders placed or performed in visit on 12/12/19   Vitamin D 25 hydroxy   Result Value Ref Range    25 Hydroxy, Vitamin D 21.0 (L) 30.0 - 100.0 ng/ml   CBC No Differential   Result Value Ref Range    WBC 4.56 3.40 - 10.80 10*3/mm3    RBC 4.15 3.77 - 5.28 10*6/mm3    Hemoglobin 13.0 12.0 - 15.9 g/dL    Hematocrit 38.6 34.0 - 46.6 %    MCV 93.0 79.0 - 97.0 fL    MCH 31.3 26.6 - 33.0 pg    MCHC 33.7 31.5 - 35.7 g/dL    RDW 11.5 (L) 12.3 - 15.4 %    RDW-SD 39.0 37.0 - 54.0 fl    MPV 10.0 6.0 - 12.0 fL    Platelets 280 140 - 450 10*3/mm3   Vitamin B12   Result Value Ref Range    Vitamin B-12 608 211 - 946 pg/mL   Lipid panel   Result Value Ref Range    Total Cholesterol 149 0 - 200 mg/dL    Triglycerides 69 0 - 150 mg/dL    HDL Cholesterol 61 (H) 40 - 60 mg/dL    LDL Cholesterol  74 0 - 100 mg/dL    VLDL Cholesterol 13.8 5 - 40 mg/dL    LDL/HDL Ratio 1.22    Comprehensive metabolic panel   Result Value Ref Range    Glucose 90 65 - 99 mg/dL    BUN 14 6 - 20 mg/dL    Creatinine 0.53 (L) 0.57 - 1.00 mg/dL    Sodium 139 136 - 145 mmol/L    Potassium 3.9 3.5 - 5.2 mmol/L    Chloride 102 98 - 107 mmol/L    CO2 29.0 22.0 - 29.0 mmol/L    Calcium 8.5 (L) 8.6 - 10.5 mg/dL    Total Protein 7.2 6.0 - 8.5 g/dL    Albumin 4.10 3.50 - 5.20 g/dL    ALT (SGPT) 33 1 - 33 U/L    AST (SGOT) 20 1 - 32 U/L    Alkaline  Phosphatase 48 39 - 117 U/L    Total Bilirubin 0.5 0.2 - 1.2 mg/dL    eGFR Non African Amer 124 >60 mL/min/1.73    eGFR  African Amer >150 >60 mL/min/1.73    Globulin 3.1 gm/dL    A/G Ratio 1.3 g/dL    BUN/Creatinine Ratio 26.4 (H) 7.0 - 25.0    Anion Gap 8.0 5.0 - 15.0 mmol/L   Results for orders placed or performed in visit on 12/10/18   Vitamin D 25 hydroxy   Result Value Ref Range    25 Hydroxy, Vitamin D 37.4 30.0 - 100.0 ng/ml   CBC No Differential   Result Value Ref Range    WBC 5.54 3.20 - 9.80 10*3/mm3    RBC 4.13 3.77 - 5.16 10*6/mm3    Hemoglobin 12.7 12.0 - 15.5 g/dL    Hematocrit 37.5 35.0 - 45.0 %    MCV 90.8 80.0 - 98.0 fL    MCH 30.8 26.5 - 34.0 pg    MCHC 33.9 31.4 - 36.0 g/dL    RDW 12.4 11.5 - 14.5 %    RDW-SD 41.2 36.4 - 46.3 fl    MPV 9.6 8.0 - 12.0 fL    Platelets 272 150 - 450 10*3/mm3   TSH   Result Value Ref Range    TSH 1.320 0.460 - 4.680 mIU/mL   T4, free   Result Value Ref Range    Free T4 0.93 0.78 - 2.19 ng/dL   Iron level   Result Value Ref Range    Iron 57 37 - 170 mcg/dL   Vitamin B12   Result Value Ref Range    Vitamin B-12 644 239 - 931 pg/mL   Comprehensive metabolic panel   Result Value Ref Range    Glucose 100 60 - 100 mg/dL    BUN 17 7 - 21 mg/dL    Creatinine 0.58 0.50 - 1.00 mg/dL    Sodium 136 (L) 137 - 145 mmol/L    Potassium 4.0 3.5 - 5.1 mmol/L    Chloride 101 95 - 110 mmol/L    CO2 28.0 22.0 - 31.0 mmol/L    Calcium 8.3 (L) 8.4 - 10.2 mg/dL    Total Protein 7.0 6.3 - 8.6 g/dL    Albumin 4.20 3.40 - 4.80 g/dL    ALT (SGPT) 44 9 - 52 U/L    AST (SGOT) 56 (H) 14 - 36 U/L    Alkaline Phosphatase 47 38 - 126 U/L    Total Bilirubin 0.3 0.2 - 1.3 mg/dL    eGFR Non  Amer 112 58 - 135 mL/min/1.73    eGFR   Amer 136 (H) 58 - 135 mL/min/1.73    Globulin 2.8 2.3 - 3.5 gm/dL    A/G Ratio 1.5 1.1 - 1.8 g/dL    BUN/Creatinine Ratio 29.3 (H) 7.0 - 25.0    Anion Gap 7.0 5.0 - 15.0 mmol/L   Results for orders placed or performed in visit on 10/31/17   Hepatic Function Panel    Result Value Ref Range    Total Protein 7.3 6.3 - 8.6 g/dL    Albumin 4.10 3.40 - 4.80 g/dL    ALT (SGPT) 48 9 - 52 U/L    AST (SGOT) 59 (H) 14 - 36 U/L    Alkaline Phosphatase 44 38 - 126 U/L    Total Bilirubin 0.7 0.2 - 1.3 mg/dL    Bilirubin, Direct 0.0 0.0 - 0.3 mg/dL    Bilirubin, Indirect 0.5 0.0 - 1.1 mg/dL   Results for orders placed or performed in visit on 10/17/17   CBC No Differential   Result Value Ref Range    WBC 5.79 3.20 - 9.80 10*3/mm3    RBC 4.20 3.77 - 5.16 10*6/mm3    Hemoglobin 12.7 12.0 - 15.5 g/dL    Hematocrit 38.6 35.0 - 45.0 %    MCV 91.9 80.0 - 98.0 fL    MCH 30.2 26.5 - 34.0 pg    MCHC 32.9 31.4 - 36.0 g/dL    RDW 12.4 11.5 - 14.5 %    RDW-SD 42.0 36.4 - 46.3 fl    MPV 10.0 8.0 - 12.0 fL    Platelets 278 150 - 450 10*3/mm3     *Note: Due to a large number of results and/or encounters for the requested time period, some results have not been displayed. A complete set of results can be found in Results Review.         This document has been electronically signed by Trey Lara MD on April 21, 2020 11:30

## 2020-08-25 ENCOUNTER — OFFICE VISIT (OUTPATIENT)
Dept: FAMILY MEDICINE CLINIC | Facility: CLINIC | Age: 45
End: 2020-08-25

## 2020-08-25 ENCOUNTER — HOSPITAL ENCOUNTER (OUTPATIENT)
Dept: ULTRASOUND IMAGING | Facility: HOSPITAL | Age: 45
Discharge: HOME | End: 2020-08-25
Admitting: FAMILY MEDICINE

## 2020-08-25 ENCOUNTER — LAB (OUTPATIENT)
Dept: LAB | Facility: HOSPITAL | Age: 45
End: 2020-08-25

## 2020-08-25 VITALS
HEART RATE: 92 BPM | DIASTOLIC BLOOD PRESSURE: 70 MMHG | TEMPERATURE: 98 F | OXYGEN SATURATION: 96 % | BODY MASS INDEX: 25.25 KG/M2 | SYSTOLIC BLOOD PRESSURE: 126 MMHG | HEIGHT: 62 IN | WEIGHT: 137.2 LBS

## 2020-08-25 DIAGNOSIS — E04.9 GOITER: Primary | ICD-10-CM

## 2020-08-25 DIAGNOSIS — Z00.00 HEALTHCARE MAINTENANCE: ICD-10-CM

## 2020-08-25 DIAGNOSIS — L03.311 CELLULITIS OF ABDOMINAL WALL: ICD-10-CM

## 2020-08-25 DIAGNOSIS — E04.9 GOITER: ICD-10-CM

## 2020-08-25 PROCEDURE — 36415 COLL VENOUS BLD VENIPUNCTURE: CPT

## 2020-08-25 PROCEDURE — 99213 OFFICE O/P EST LOW 20 MIN: CPT | Performed by: STUDENT IN AN ORGANIZED HEALTH CARE EDUCATION/TRAINING PROGRAM

## 2020-08-25 PROCEDURE — 84436 ASSAY OF TOTAL THYROXINE: CPT

## 2020-08-25 PROCEDURE — 84443 ASSAY THYROID STIM HORMONE: CPT

## 2020-08-25 PROCEDURE — 76536 US EXAM OF HEAD AND NECK: CPT

## 2020-08-25 PROCEDURE — 86803 HEPATITIS C AB TEST: CPT

## 2020-08-25 RX ORDER — CEPHALEXIN 500 MG/1
500 CAPSULE ORAL 4 TIMES DAILY
Qty: 20 CAPSULE | Refills: 0 | Status: SHIPPED | OUTPATIENT
Start: 2020-08-25 | End: 2020-08-30

## 2020-08-25 NOTE — PROGRESS NOTES
Family Medicine Residency  Dwayne Rodrigez MD    Subjective:     Brittnee Boyd is a 45 y.o. female who presents for swelling/a growth in her neck.  This is been increasing in size over the last months.  She does not note any pain on palpitation.  She does note slight difficulty with swallowing.  Patient did not report any history of thyroid disease.  Patient denied any recent viral illness.  She has noted some fatigue but no overall decrease in energy.  No issues with hot or cold intolerance.  Patient did report occasional palpitations with stress while at work.  She denies any chest pain.    Over the last 2 days patient has developed pain and a bump/lesion in her bellybutton.  Patient has not had a fever and not noted discharge from this lesion.  It is erythematous without fluctuance.    The following portions of the patient's history were reviewed and updated as appropriate: allergies, current medications, past family history, past medical history, past social history, past surgical history and problem list.    Past Medical Hx:  Past Medical History:   Diagnosis Date   • Abdominal pain, epigastric    • Acute pharyngitis    • Cough    • Diarrhea of presumed infectious origin    • Dysuria    • Epigastric pain    • Helicobacter positive gastritis    • Irregular menstrual cycle    • Irregular periods    • Onychomycosis    • Pain in right shoulder    • Pain in unspecified upper arm     BILATERAL   • Paronychia of finger    • Polyp of cervix    • Rhinitis    • Right flank pain    • Right lower quadrant pain    • Upper respiratory infection    • Urinary tract infectious disease        Past Surgical Hx:  Past Surgical History:   Procedure Laterality Date   •  SECTION     • COLONOSCOPY N/A 2020    Procedure: COLONOSCOPY;  Surgeon: Trey Lara MD;  Location: [facility] ENDOSCOPY;  Service: Gastroenterology   • ENDOSCOPY  2014    EGD w/ tube 07498 (Normal esophagus. Erosive in stomach. Biopsy taken.  Normal duodenum. Biopsy taken.)   • SIGMOIDOSCOPY N/A 3/17/2020    Procedure: SIGMOIDOSCOPY FLEXIBLE with possible hemorrhoid banding March 17;  Surgeon: Trey Lara MD;  Location: [facility] ENDOSCOPY;  Service: Gastroenterology;  Laterality: N/A;  banding x 6   hemmroids       Current Meds:    Current Outpatient Medications:   •  cephalexin (Keflex) 500 MG capsule, Take 1 capsule by mouth 4 (Four) Times a Day for 5 days., Disp: 20 capsule, Rfl: 0    Allergies:  No Known Allergies    Family Hx:  Family History   Problem Relation Age of Onset   • Breast cancer Neg Hx    • Ovarian cancer Neg Hx    • Uterine cancer Neg Hx    • Colon cancer Neg Hx         Social History:  Social History     Socioeconomic History   • Marital status:      Spouse name: Not on file   • Number of children: Not on file   • Years of education: Not on file   • Highest education level: Not on file   Tobacco Use   • Smoking status: Never Smoker   • Smokeless tobacco: Never Used   Substance and Sexual Activity   • Alcohol use: No   • Drug use: No   • Sexual activity: Yes     Partners: Male     Birth control/protection: None       Review of Systems  Review of Systems   Constitutional: Positive for fatigue. Negative for chills, fever and unexpected weight change.   HENT: Positive for trouble swallowing. Negative for congestion, postnasal drip, rhinorrhea, sore throat and voice change.    Eyes: Negative for pain and visual disturbance.   Respiratory: Negative for cough and shortness of breath.    Cardiovascular: Negative for chest pain and leg swelling.   Gastrointestinal: Negative for abdominal pain, diarrhea, nausea and vomiting.   Endocrine: Negative for cold intolerance and heat intolerance.   Genitourinary: Negative for difficulty urinating, dysuria and hematuria.   Musculoskeletal: Positive for myalgias. Negative for arthralgias and back pain.   Skin: Positive for rash. Negative for wound.   Neurological: Negative for dizziness,  "syncope, weakness, light-headedness and headaches.       Objective:     /70   Pulse 92   Temp 98 °F (36.7 °C)   Ht 157.5 cm (62\")   Wt 62.2 kg (137 lb 3.2 oz)   SpO2 96%   BMI 25.09 kg/m²   Physical Exam   Constitutional: She is oriented to person, place, and time. She appears well-developed.   HENT:   Head: Normocephalic and atraumatic.   Mouth/Throat: No oropharyngeal exudate.   Eyes: Pupils are equal, round, and reactive to light. Conjunctivae are normal. No scleral icterus.   Neck: Trachea normal. Neck supple. Normal carotid pulses present. Carotid bruit is not present. Thyromegaly present. No thyroid mass present.   Cardiovascular: Normal rate and regular rhythm.   No murmur heard.  Pulmonary/Chest: Effort normal and breath sounds normal. No respiratory distress.   Abdominal: Soft. Bowel sounds are normal. She exhibits no distension. There is no hepatosplenomegaly. There is no tenderness. There is negative Castro's sign.       Musculoskeletal: She exhibits no edema or deformity.   Neurological: She is alert and oriented to person, place, and time.   Skin: Skin is warm and dry. She is not diaphoretic.   Psychiatric: She has a normal mood and affect. Her behavior is normal.   Vitals reviewed.       Assessment/Plan:     Brittnee was seen today for neck pain.    Diagnoses and all orders for this visit:    Goiter  -     TSH; Future  -     T4; Future  -     US Thyroid    Cellulitis of abdominal wall  -     cephalexin (Keflex) 500 MG capsule; Take 1 capsule by mouth 4 (Four) Times a Day for 5 days.    Healthcare maintenance  -     Hepatitis C antibody; Future        Initial thyroid studies were ordered.  Consider uptake study based on results of ultrasound and TSH/T4.  Patient has cellulitis versus potential folliculitis just superior to umbilicus.  5-day course of Keflex ordered.  Patient unable to follow-up in 1 week so offered follow-up in 2 weeks.  Patient requested that her sister be called with results " due to the language barrier.    · Rx changes: Keflex  · Patient Education: Signs and symptoms of thyroid disease  · Compliance at present is estimated to be fair.   · Efforts to improve compliance (if necessary) will be directed at increased exercise.     Follow-up:     Return in about 2 weeks (around 9/8/2020).    Preventative:  Health Maintenance   Topic Date Due   • TDAP/TD VACCINES (1 - Tdap) 02/22/1984   • HEPATITIS C SCREENING  09/13/2016   • INFLUENZA VACCINE  08/01/2020   • ANNUAL PHYSICAL  12/12/2020   • PAP SMEAR  01/28/2023   • COLONOSCOPY  01/21/2030     Hep C due  Recommended: none  Vaccine Counseling: N/A    Weight  -Class: Overweight: 25.0-29.9kg/m2   -Patient's Body mass index is 25.09 kg/m². BMI is above normal parameters. Recommendations include: educational material.   eat more fruits and vegetables, decrease soda or juice intake and increase water intake    Alcohol use:  reports that she does not drink alcohol.  Nicotine status  reports that she has never smoked. She has never used smokeless tobacco.    Goals    None         RISK SCORE: 3      This document has been electronically signed by Dwayne Rodrigez MD on August 25, 2020 14:39      EMR Dragon/transcription disclaimer: Much of this encounter note was created utilizing an electronic transcription/translation of spoken language to printed text.  Electronic translation of spoken language may permit erroneous, or at times, nonsensical words or phrases to be in advertently transcribed; although I have reviewed the note for such errors to the best of my ability, some may still exist.

## 2020-08-26 LAB
HCV AB SER DONR QL: NORMAL
T4 SERPL-MCNC: 7.37 MCG/DL (ref 4.5–11.7)
TSH SERPL DL<=0.05 MIU/L-ACNC: 0.77 UIU/ML (ref 0.27–4.2)

## 2020-08-28 NOTE — PROGRESS NOTES
I have seen the patient.  I have reviewed the notes, assessments, and/or procedures performed by Dr. Rodrigez, I concur with her/his documentation and assessment and plan for Brittnee Boyd.       This document has been electronically signed by Sg Andrea MD on August 28, 2020 11:15

## 2020-08-31 ENCOUNTER — TELEPHONE (OUTPATIENT)
Dept: FAMILY MEDICINE CLINIC | Facility: CLINIC | Age: 45
End: 2020-08-31

## 2020-08-31 NOTE — TELEPHONE ENCOUNTER
PATIENT'S EMERGENCY CONTACT CALLED AND IS NEEDING A CALL BACK REGARDING LAB RESULTS FROM DR BAI. HER NUMBER TO CALL BACK [phone].        THANK YOU,      SHASHA

## 2020-09-01 ENCOUNTER — TELEPHONE (OUTPATIENT)
Dept: FAMILY MEDICINE CLINIC | Facility: CLINIC | Age: 45
End: 2020-09-01

## 2020-09-01 NOTE — TELEPHONE ENCOUNTER
PATIENT'S EMERGENCY CONTACT CALLED AND SPEAKS GOOD ENGLISH AND SHE HASNT HEARD ANYTHING BACK REGARDING THE PATIENTS LAB RESULTS. SHE WOULD LIKE A CALL BACK PLEASE [phone].        THANK YOU,      SHASHA

## 2020-09-02 DIAGNOSIS — E04.9 GOITER: Primary | ICD-10-CM

## 2020-09-08 ENCOUNTER — OFFICE VISIT (OUTPATIENT)
Dept: GASTROENTEROLOGY | Facility: CLINIC | Age: 45
End: 2020-09-08

## 2020-09-08 ENCOUNTER — TELEPHONE (OUTPATIENT)
Dept: FAMILY MEDICINE CLINIC | Facility: CLINIC | Age: 45
End: 2020-09-08

## 2020-09-08 VITALS
WEIGHT: 136.8 LBS | DIASTOLIC BLOOD PRESSURE: 62 MMHG | HEART RATE: 77 BPM | SYSTOLIC BLOOD PRESSURE: 100 MMHG | BODY MASS INDEX: 25.17 KG/M2 | HEIGHT: 62 IN

## 2020-09-08 DIAGNOSIS — E04.9 GOITER: Primary | ICD-10-CM

## 2020-09-08 DIAGNOSIS — R19.4 CHANGE IN BOWEL HABITS: Primary | ICD-10-CM

## 2020-09-08 PROCEDURE — 99212 OFFICE O/P EST SF 10 MIN: CPT | Performed by: INTERNAL MEDICINE

## 2020-09-08 NOTE — PROGRESS NOTES
Chief Complaint   Patient presents with   • Irritable Bowel Syndrome   • Follow-up   • Rectal Pain   • Hemorrhoids     past       Subjective    Brittnee Boyd is a 45 y.o. female.    History of Present Illness    Patient presented to GI clinic for follow-up visit today.  She feels well currently.  No further rectal bleeding since hemorrhoidal banding.  No GI complaints at this time.  Off medications.     The following portions of the patient's history were reviewed and updated as appropriate:   Past Medical History:   Diagnosis Date   • Abdominal pain, epigastric    • Acute pharyngitis    • Cough    • Diarrhea of presumed infectious origin    • Dysuria    • Epigastric pain    • Helicobacter positive gastritis    • Irregular menstrual cycle    • Irregular periods    • Onychomycosis    • Pain in right shoulder    • Pain in unspecified upper arm     BILATERAL   • Paronychia of finger    • Polyp of cervix    • Rhinitis    • Right flank pain    • Right lower quadrant pain    • Upper respiratory infection    • Urinary tract infectious disease      Past Surgical History:   Procedure Laterality Date   •  SECTION     • COLONOSCOPY N/A 2020    Procedure: COLONOSCOPY;  Surgeon: Trey Lara MD;  Location: [facility] ENDOSCOPY;  Service: Gastroenterology   • ENDOSCOPY  2014    EGD w/ tube 52146 (Normal esophagus. Erosive in stomach. Biopsy taken. Normal duodenum. Biopsy taken.)   • SIGMOIDOSCOPY N/A 3/17/2020    Procedure: SIGMOIDOSCOPY FLEXIBLE with possible hemorrhoid banding ;  Surgeon: Trey Lara MD;  Location: [facility] ENDOSCOPY;  Service: Gastroenterology;  Laterality: N/A;  banding x 6   hemmroids     Family History   Problem Relation Age of Onset   • Breast cancer Neg Hx    • Ovarian cancer Neg Hx    • Uterine cancer Neg Hx    • Colon cancer Neg Hx      OB History        2    Para   2    Term   2            AB        Living   2       SAB        TAB        Ectopic         "Molar        Multiple        Live Births                  Prior to Admission medications    Not on File     No Known Allergies  Social History     Socioeconomic History   • Marital status:      Spouse name: Not on file   • Number of children: Not on file   • Years of education: Not on file   • Highest education level: Not on file   Tobacco Use   • Smoking status: Never Smoker   • Smokeless tobacco: Never Used   Substance and Sexual Activity   • Alcohol use: No   • Drug use: No   • Sexual activity: Yes     Partners: Male     Birth control/protection: None       Review of Systems  Review of Systems   Constitutional: Negative for chills, fatigue, fever and unexpected weight change.   HENT: Negative for congestion, ear discharge, hearing loss, nosebleeds and sore throat.    Eyes: Negative for pain, discharge and redness.   Respiratory: Negative for cough, chest tightness, shortness of breath and wheezing.    Cardiovascular: Negative for chest pain and palpitations.   Gastrointestinal: Negative for abdominal distention, abdominal pain, blood in stool, constipation, diarrhea, nausea and vomiting.   Endocrine: Negative for cold intolerance, polydipsia, polyphagia and polyuria.   Genitourinary: Negative for dysuria, flank pain, frequency, hematuria and urgency.   Musculoskeletal: Negative for arthralgias, back pain, joint swelling and myalgias.   Skin: Negative for color change, pallor and rash.   Neurological: Negative for tremors, seizures, syncope, weakness and headaches.   Hematological: Negative for adenopathy. Does not bruise/bleed easily.   Psychiatric/Behavioral: Negative for behavioral problems, confusion, dysphoric mood, hallucinations and suicidal ideas. The patient is not nervous/anxious.         /62 (BP Location: Left arm, Patient Position: Sitting)   Pulse 77   Ht 157.5 cm (62\")   Wt 62.1 kg (136 lb 12.8 oz)   BMI 25.02 kg/m²     Objective    Physical Exam   Constitutional: She is oriented to " person, place, and time. She appears well-developed and well-nourished.   HENT:   Head: Normocephalic and atraumatic.   Mouth/Throat: Oropharynx is clear and moist.   Eyes: Pupils are equal, round, and reactive to light. Conjunctivae and EOM are normal.   Neck: Normal range of motion. Neck supple. No thyromegaly present.   Cardiovascular: Normal rate, regular rhythm and normal heart sounds.   No murmur heard.  Pulmonary/Chest: Effort normal and breath sounds normal. She has no wheezes.   Abdominal: Soft. Bowel sounds are normal. She exhibits no distension and no mass. There is no tenderness. No hernia.   Genitourinary:   Genitourinary Comments: No lesions noted   Musculoskeletal: Normal range of motion. She exhibits no edema or tenderness.   Lymphadenopathy:     She has no cervical adenopathy.   Neurological: She is alert and oriented to person, place, and time. No cranial nerve deficit.   Skin: Skin is warm and dry. No rash noted.   Psychiatric: She has a normal mood and affect. Thought content normal.     Lab on 08/25/2020   Component Date Value Ref Range Status   • TSH 08/25/2020 0.767  0.270 - 4.200 uIU/mL Final   • T4, Total 08/25/2020 7.37  4.50 - 11.70 mcg/dL Final   • Hepatitis C Ab 08/25/2020 Non-Reactive  Non-Reactive Final     Assessment/Plan    No diagnosis found..   1. irritable bowel syndrome.    Patient asymptomatic off medications.  Continue High-fiber diet.  2.  Hemorrhoidal bleeding and discomfort, resolved post banding.    Orders placed during this encounter include:  No orders of the defined types were placed in this encounter.      * Surgery not found *    Review and/or summary of lab tests, radiology, procedures, medications. Review and summary of old records and obtaining of history. The risks and benefits of my recommendations, as well as other treatment options were discussed with the patient and son today. Questions were answered.    No orders of the defined types were placed in this  encounter.      Follow-up: No follow-ups on file.               Results for orders placed or performed in visit on 08/25/20   Hepatitis C antibody   Result Value Ref Range    Hepatitis C Ab Non-Reactive Non-Reactive   TSH   Result Value Ref Range    TSH 0.767 0.270 - 4.200 uIU/mL   T4   Result Value Ref Range    T4, Total 7.37 4.50 - 11.70 mcg/dL   Results for orders placed or performed in visit on 02/26/20   Pregnancy, Urine - Urine, Clean Catch   Result Value Ref Range    HCG, Urine QL Negative Negative   Results for orders placed or performed in visit on 01/28/20   Chlamydia trachomatis, Neisseria gonorrhoeae, Trichomonas vaginalis, PCR - Swab, Vagina   Result Value Ref Range    Chlamydia DNA by PCR Negative Negative    Neisseria gonorrhoeae by PCR Negative Negative    Trichomonas vaginalis PCR Negative    Gardnerella vaginalis, Trichomonas vaginalis, Candida albicans, DNA - Swab, Vagina   Result Value Ref Range    CANDIDA ALBICANS Negative     GARDNERELLA VAGINALIS Positive     TRICHOMONAS VAGINALIS Negative    Liquid-based Pap Smear, Screening   Result Value Ref Range    Case Report       Gynecologic Cytology Report                       Case: [id number]                                  Authorizing Provider:  Feliberto oRdgers MD            Collected:           01/28/2020 03:38 PM          Ordering Location:     [facility]     Received:            01/29/2020 07:30 AM                                 [facility]                                                        First Screen:          Kenisha Harper                                                              Specimen:    Liquid-Based Pap, Screening, Cervix                                                        Interpretation Negative for intraepithelial lesion or malignancy      General Categorization Within normal limits     Other Findings Shift in ajay suggestive of bacterial vaginosis     Specimen Adequacy Satisfactory for evaluation      Additional Information       Disclaimer: Cervical cytology is a screening test primarily for squamous cancer and its precursors and has associated false-negative and false-positive results.  Technologies such as liquid-based preparations may decrease but will not eliminate all false-negative results.  Follow-up of unexplained clinical signs and symptoms is recommended to minimize false-negative results. ([employer] System for Reporting Cervical Cytology: [place], 2015).     Results for orders placed or performed in visit on 12/12/19   Vitamin D 25 hydroxy   Result Value Ref Range    25 Hydroxy, Vitamin D 21.0 (L) 30.0 - 100.0 ng/ml   CBC No Differential   Result Value Ref Range    WBC 4.56 3.40 - 10.80 10*3/mm3    RBC 4.15 3.77 - 5.28 10*6/mm3    Hemoglobin 13.0 12.0 - 15.9 g/dL    Hematocrit 38.6 34.0 - 46.6 %    MCV 93.0 79.0 - 97.0 fL    MCH 31.3 26.6 - 33.0 pg    MCHC 33.7 31.5 - 35.7 g/dL    RDW 11.5 (L) 12.3 - 15.4 %    RDW-SD 39.0 37.0 - 54.0 fl    MPV 10.0 6.0 - 12.0 fL    Platelets 280 140 - 450 10*3/mm3   Vitamin B12   Result Value Ref Range    Vitamin B-12 608 211 - 946 pg/mL   Lipid panel   Result Value Ref Range    Total Cholesterol 149 0 - 200 mg/dL    Triglycerides 69 0 - 150 mg/dL    HDL Cholesterol 61 (H) 40 - 60 mg/dL    LDL Cholesterol  74 0 - 100 mg/dL    VLDL Cholesterol 13.8 5 - 40 mg/dL    LDL/HDL Ratio 1.22    Comprehensive metabolic panel   Result Value Ref Range    Glucose 90 65 - 99 mg/dL    BUN 14 6 - 20 mg/dL    Creatinine 0.53 (L) 0.57 - 1.00 mg/dL    Sodium 139 136 - 145 mmol/L    Potassium 3.9 3.5 - 5.2 mmol/L    Chloride 102 98 - 107 mmol/L    CO2 29.0 22.0 - 29.0 mmol/L    Calcium 8.5 (L) 8.6 - 10.5 mg/dL    Total Protein 7.2 6.0 - 8.5 g/dL    Albumin 4.10 3.50 - 5.20 g/dL    ALT (SGPT) 33 1 - 33 U/L    AST (SGOT) 20 1 - 32 U/L    Alkaline Phosphatase 48 39 - 117 U/L    Total Bilirubin 0.5 0.2 - 1.2 mg/dL    eGFR Non African Amer 124 >60 mL/min/1.73    eGFR  African Amer >150  >60 mL/min/1.73    Globulin 3.1 gm/dL    A/G Ratio 1.3 g/dL    BUN/Creatinine Ratio 26.4 (H) 7.0 - 25.0    Anion Gap 8.0 5.0 - 15.0 mmol/L   Results for orders placed or performed in visit on 12/10/18   Vitamin D 25 hydroxy   Result Value Ref Range    25 Hydroxy, Vitamin D 37.4 30.0 - 100.0 ng/ml   CBC No Differential   Result Value Ref Range    WBC 5.54 3.20 - 9.80 10*3/mm3    RBC 4.13 3.77 - 5.16 10*6/mm3    Hemoglobin 12.7 12.0 - 15.5 g/dL    Hematocrit 37.5 35.0 - 45.0 %    MCV 90.8 80.0 - 98.0 fL    MCH 30.8 26.5 - 34.0 pg    MCHC 33.9 31.4 - 36.0 g/dL    RDW 12.4 11.5 - 14.5 %    RDW-SD 41.2 36.4 - 46.3 fl    MPV 9.6 8.0 - 12.0 fL    Platelets 272 150 - 450 10*3/mm3   TSH   Result Value Ref Range    TSH 1.320 0.460 - 4.680 mIU/mL   T4, free   Result Value Ref Range    Free T4 0.93 0.78 - 2.19 ng/dL   Iron level   Result Value Ref Range    Iron 57 37 - 170 mcg/dL   Vitamin B12   Result Value Ref Range    Vitamin B-12 644 239 - 931 pg/mL   Comprehensive metabolic panel   Result Value Ref Range    Glucose 100 60 - 100 mg/dL    BUN 17 7 - 21 mg/dL    Creatinine 0.58 0.50 - 1.00 mg/dL    Sodium 136 (L) 137 - 145 mmol/L    Potassium 4.0 3.5 - 5.1 mmol/L    Chloride 101 95 - 110 mmol/L    CO2 28.0 22.0 - 31.0 mmol/L    Calcium 8.3 (L) 8.4 - 10.2 mg/dL    Total Protein 7.0 6.3 - 8.6 g/dL    Albumin 4.20 3.40 - 4.80 g/dL    ALT (SGPT) 44 9 - 52 U/L    AST (SGOT) 56 (H) 14 - 36 U/L    Alkaline Phosphatase 47 38 - 126 U/L    Total Bilirubin 0.3 0.2 - 1.3 mg/dL    eGFR Non  Amer 112 58 - 135 mL/min/1.73    eGFR   Amer 136 (H) 58 - 135 mL/min/1.73    Globulin 2.8 2.3 - 3.5 gm/dL    A/G Ratio 1.5 1.1 - 1.8 g/dL    BUN/Creatinine Ratio 29.3 (H) 7.0 - 25.0    Anion Gap 7.0 5.0 - 15.0 mmol/L   Results for orders placed or performed in visit on 10/31/17   Hepatic Function Panel   Result Value Ref Range    Total Protein 7.3 6.3 - 8.6 g/dL    Albumin 4.10 3.40 - 4.80 g/dL    ALT (SGPT) 48 9 - 52 U/L    AST (SGOT) 59  (H) 14 - 36 U/L    Alkaline Phosphatase 44 38 - 126 U/L    Total Bilirubin 0.7 0.2 - 1.3 mg/dL    Bilirubin, Direct 0.0 0.0 - 0.3 mg/dL    Bilirubin, Indirect 0.5 0.0 - 1.1 mg/dL   Results for orders placed or performed in visit on 10/17/17   CBC No Differential   Result Value Ref Range    WBC 5.79 3.20 - 9.80 10*3/mm3    RBC 4.20 3.77 - 5.16 10*6/mm3    Hemoglobin 12.7 12.0 - 15.5 g/dL    Hematocrit 38.6 35.0 - 45.0 %    MCV 91.9 80.0 - 98.0 fL    MCH 30.2 26.5 - 34.0 pg    MCHC 32.9 31.4 - 36.0 g/dL    RDW 12.4 11.5 - 14.5 %    RDW-SD 42.0 36.4 - 46.3 fl    MPV 10.0 8.0 - 12.0 fL    Platelets 278 150 - 450 10*3/mm3     *Note: Due to a large number of results and/or encounters for the requested time period, some results have not been displayed. A complete set of results can be found in Results Review.         This document has been electronically signed by Trey Lara MD on September 8, 2020 12:20

## 2020-09-08 NOTE — PATIENT INSTRUCTIONS

## 2020-09-08 NOTE — TELEPHONE ENCOUNTER
PATIENT CAME IN AND HASNT HEARD ANYTHING BACK ON HER THYROID ULTRASOUND RESULTS. SHE WOULD LIKE A CALL BACK PLEASE [phone].      THANK YOU,      SHASHA

## 2020-09-09 ENCOUNTER — LAB (OUTPATIENT)
Dept: LAB | Facility: HOSPITAL | Age: 45
End: 2020-09-09

## 2020-09-09 DIAGNOSIS — E04.9 GOITER: ICD-10-CM

## 2020-09-09 LAB — ERYTHROCYTE [SEDIMENTATION RATE] IN BLOOD: 6 MM/HR (ref 0–20)

## 2020-09-09 PROCEDURE — 86376 MICROSOMAL ANTIBODY EACH: CPT

## 2020-09-09 PROCEDURE — 85652 RBC SED RATE AUTOMATED: CPT

## 2020-09-09 PROCEDURE — 36415 COLL VENOUS BLD VENIPUNCTURE: CPT

## 2020-09-10 LAB — THYROPEROXIDASE AB SERPL-ACNC: <9 IU/ML (ref 0–34)

## 2020-09-11 ENCOUNTER — TELEPHONE (OUTPATIENT)
Dept: FAMILY MEDICINE CLINIC | Facility: CLINIC | Age: 45
End: 2020-09-11

## 2020-09-11 NOTE — TELEPHONE ENCOUNTER
PATIENT CALLED ASKING FOR A CALL BACK FROM PCP WHEN POSSIBLE DUE TO LOST CONNECTION DURING THEIR CALL.     CALL BACK NUMBER FOR HER [phone].    THANKS,  CHARMAINE

## 2020-12-30 ENCOUNTER — OFFICE VISIT (OUTPATIENT)
Dept: FAMILY MEDICINE CLINIC | Facility: CLINIC | Age: 45
End: 2020-12-30

## 2020-12-30 VITALS
WEIGHT: 140.8 LBS | HEIGHT: 62 IN | HEART RATE: 77 BPM | DIASTOLIC BLOOD PRESSURE: 72 MMHG | BODY MASS INDEX: 25.91 KG/M2 | TEMPERATURE: 97.6 F | SYSTOLIC BLOOD PRESSURE: 122 MMHG | OXYGEN SATURATION: 99 %

## 2020-12-30 DIAGNOSIS — Z00.00 ANNUAL PHYSICAL EXAM: Primary | ICD-10-CM

## 2020-12-30 DIAGNOSIS — M54.50 ACUTE BILATERAL LOW BACK PAIN WITHOUT SCIATICA: ICD-10-CM

## 2020-12-30 PROCEDURE — 99396 PREV VISIT EST AGE 40-64: CPT | Performed by: STUDENT IN AN ORGANIZED HEALTH CARE EDUCATION/TRAINING PROGRAM

## 2020-12-30 RX ORDER — TIZANIDINE HYDROCHLORIDE 2 MG/1
2 CAPSULE, GELATIN COATED ORAL 3 TIMES DAILY PRN
Qty: 90 CAPSULE | Refills: 0 | Status: SHIPPED | OUTPATIENT
Start: 2020-12-30 | End: 2021-02-17

## 2020-12-30 RX ORDER — LIDOCAINE 50 MG/G
1 PATCH TOPICAL EVERY 24 HOURS
Qty: 30 EACH | Refills: 0 | OUTPATIENT
Start: 2020-12-30 | End: 2021-01-11

## 2020-12-30 NOTE — PROGRESS NOTES
Family Medicine Residency  Madeline Lin MD    Subjective:     Brittnee Boyd is a 45 y.o. female who presents for annual exam and back pain.  Patient is fluent in Mandarin, presents with her son to the visit, the language line  service was used for this visit.    Annual  Patient presents today for her annual exam, feels overall well, states that she had her thyroid checked a few months back with Dr. Rodrigez, and was grateful to hear everything was normal.  She would just like a checkup, and presents complaining of recent back pain issues.  Patient also notes that she has always been regular on her periods, and recently had her LMP earlier in December, but prior to that she skipped 2 months.    Back pain  Patient states that she started to have back pain 2 months ago, cannot find any extremity rating factors.  Mildly relieved by rest, but continues to persist.  She is requesting some sort of a patch to relieve the pain.  Patient works as a  and is on her feet constantly.  Planes of limited range of motion, and pain with certain movements.    The following portions of the patient's history were reviewed and updated as appropriate: allergies, current medications, past family history, past medical history, past social history, past surgical history and problem list.    Past Medical Hx:  Past Medical History:   Diagnosis Date   • Abdominal pain, epigastric    • Acute pharyngitis    • Cough    • Diarrhea of presumed infectious origin    • Dysuria    • Epigastric pain    • Helicobacter positive gastritis    • Irregular menstrual cycle    • Irregular periods    • Onychomycosis    • Pain in right shoulder    • Pain in unspecified upper arm     BILATERAL   • Paronychia of finger    • Polyp of cervix    • Rhinitis    • Right flank pain    • Right lower quadrant pain    • Upper respiratory infection    • Urinary tract infectious disease        Past Surgical Hx:  Past Surgical History:   Procedure Laterality  Date   •  SECTION     • COLONOSCOPY N/A 2020    Procedure: COLONOSCOPY;  Surgeon: Trey Lara MD;  Location: [facility] ENDOSCOPY;  Service: Gastroenterology   • ENDOSCOPY  2014    EGD w/ tube 71660 (Normal esophagus. Erosive in stomach. Biopsy taken. Normal duodenum. Biopsy taken.)   • SIGMOIDOSCOPY N/A 3/17/2020    Procedure: SIGMOIDOSCOPY FLEXIBLE with possible hemorrhoid banding ;  Surgeon: Trey Lara MD;  Location: [facility] ENDOSCOPY;  Service: Gastroenterology;  Laterality: N/A;  banding x 6   hemmroids       Current Meds:  No current outpatient medications on file.    Allergies:  No Known Allergies    Family Hx:  Family History   Problem Relation Age of Onset   • Breast cancer Neg Hx    • Ovarian cancer Neg Hx    • Uterine cancer Neg Hx    • Colon cancer Neg Hx         Social History:  Social History     Socioeconomic History   • Marital status:      Spouse name: Not on file   • Number of children: Not on file   • Years of education: Not on file   • Highest education level: Not on file   Tobacco Use   • Smoking status: Never Smoker   • Smokeless tobacco: Never Used   Substance and Sexual Activity   • Alcohol use: No   • Drug use: No   • Sexual activity: Yes     Partners: Male     Birth control/protection: None       Review of Systems  Review of Systems   Constitutional: Negative for chills and fever.   HENT: Negative for rhinorrhea and sore throat.    Eyes: Negative for photophobia and visual disturbance.   Respiratory: Negative for cough and shortness of breath.    Cardiovascular: Negative for chest pain and palpitations.   Gastrointestinal: Negative for abdominal pain and nausea.   Genitourinary: Positive for menstrual problem ( Was regular, skip 2 months and got it in December.). Negative for difficulty urinating and dyspareunia.   Musculoskeletal: Positive for back pain ( Low back) and myalgias (Occasional). Negative for arthralgias.   Neurological: Negative for  "light-headedness and headaches.   Psychiatric/Behavioral: Negative for dysphoric mood and sleep disturbance.   All other systems reviewed and are negative.      Objective:     /72   Pulse 77   Temp 97.6 °F (36.4 °C) (Tympanic)   Ht 157.5 cm (62\")   Wt 63.9 kg (140 lb 12.8 oz)   SpO2 99%   BMI 25.75 kg/m²   Physical Exam  Vitals signs reviewed.   Constitutional:       Appearance: She is well-developed, well-groomed and overweight.      Interventions: Face mask in place.   HENT:      Head: Normocephalic.      Right Ear: Hearing, tympanic membrane, ear canal and external ear normal.      Left Ear: Hearing, tympanic membrane, ear canal and external ear normal.      Nose: Nose normal.      Mouth/Throat:      Lips: Pink.      Mouth: Mucous membranes are moist.      Pharynx: Oropharynx is clear. Uvula midline.   Eyes:      General: Lids are normal. Vision grossly intact.      Extraocular Movements: Extraocular movements intact.      Conjunctiva/sclera: Conjunctivae normal.      Pupils: Pupils are equal, round, and reactive to light.   Neck:      Musculoskeletal: Normal range of motion and neck supple.      Thyroid: No thyroid mass, thyromegaly or thyroid tenderness.   Cardiovascular:      Rate and Rhythm: Normal rate and regular rhythm.      Pulses: Normal pulses.      Heart sounds: Normal heart sounds. No murmur. No friction rub. No gallop.    Pulmonary:      Effort: Pulmonary effort is normal.      Breath sounds: Normal breath sounds and air entry. No wheezing, rhonchi or rales.   Abdominal:      General: Bowel sounds are normal.      Palpations: Abdomen is soft.      Tenderness: There is no abdominal tenderness. There is no guarding or rebound.   Musculoskeletal:      Cervical back: She exhibits normal range of motion, no tenderness, no bony tenderness and no pain.      Thoracic back: She exhibits normal range of motion, no tenderness, no bony tenderness and no pain.      Lumbar back: She exhibits pain ( " Paraspinal ). She exhibits normal range of motion, no tenderness and no bony tenderness.      Right lower leg: No edema.      Left lower leg: No edema.      Comments: Positive bilateral straight leg test, reproducible back pain.   Lymphadenopathy:      Cervical: No cervical adenopathy.   Skin:     General: Skin is warm.   Neurological:      Mental Status: She is alert and oriented to person, place, and time.      Cranial Nerves: Cranial nerves are intact.      Sensory: Sensation is intact.      Motor: Motor function is intact.      Coordination: Coordination is intact.      Gait: Gait is intact.   Psychiatric:         Attention and Perception: Attention and perception normal.         Mood and Affect: Mood and affect normal.         Speech: Speech normal.         Behavior: Behavior normal. Behavior is cooperative.         Thought Content: Thought content normal.         Cognition and Memory: Cognition and memory normal.         Judgment: Judgment normal.          Assessment/Plan:     Diagnoses and all orders for this visit:    1. Annual physical exam (Primary)    2. Acute bilateral low back pain without sciatica  -     TiZANidine (Zanaflex) 2 MG capsule; Take 1 capsule by mouth 3 (Three) Times a Day As Needed for Muscle Spasms (Back pain).  Dispense: 90 capsule; Refill: 0  -     lidocaine (LIDODERM) 5 %; Place 1 patch on the skin as directed by provider Daily. Remove & Discard patch within 12 hours or as directed by MD  Dispense: 30 each; Refill: 0    Other orders  -     Cancel: FluLaval Quad >6 Months ([id number])      1. Patient was given Zanaflex 2 mg 3 times daily as needed for low back muscle spasms, and Lidoderm patch to be applied for back pain.  Patient was also encouraged to stretch on the days that she works in order to prevent strain and stress on her lower back.    2.   Patient was provided information to the health department for vaccinations, our office ran out of the flu vaccine, patient was directed to  the health department and asked to bring us the records to update her chart.  Of consideration, patient could be starting to become menopausal, need to ask about LMP at each visit.    3.   Patient understood and acknowledged education, counseling, medication/treatment benefits and side effects. Risks, complications, and expectations of outcomes were also addressed, discussed, and acknowledged. All questions were answered and addressed.     · Rx changes: As above  · Patient Education: As above  · Compliance at present is estimated to be good.   · Efforts to improve compliance (if necessary) will be directed at Continued physician-patient relationship.  ·      Follow-up:     Return in about 6 weeks (around 2/10/2021) for Recheck back pain.    [Follow up plan: Back pain].    Preventative:  Health Maintenance   Topic Date Due   • TDAP/TD VACCINES (1 - Tdap) 02/22/1992   • INFLUENZA VACCINE  08/01/2020   • ANNUAL PHYSICAL  12/12/2020   • PAP SMEAR  01/28/2023   • COLONOSCOPY  01/21/2030   • HEPATITIS C SCREENING  Completed   • Pneumococcal Vaccine 0-64  Aged Out   • MENINGOCOCCAL VACCINE  Aged Out     Female Preventative: Currently up-to-date.  Recommended: Td and Influenza  Vaccine Counseling: Patient was directed to the health department.    Weight  -Class: Overweight: 25.0-29.9kg/m2   -Patient's Body mass index is 25.75 kg/m². BMI is above normal parameters. Recommendations include: exercise counseling, nutrition counseling and referral to primary care.   eat more fruits and vegetables, decrease soda or juice intake, increase water intake, increase physical activity, cut out extra servings, reduce fast food intake and have 3 meals a day    Alcohol use:  reports no history of alcohol use.  Nicotine status  reports that she has never smoked. She has never used smokeless tobacco.    Goals    None         RISK SCORE: 2        This document has been electronically signed by Madeline Lin MD on December 30, 2020 14:57 CST        Part of this note may be an electronic transcription/translation of spoken language to printed text using the Dragon Dictation System.

## 2020-12-31 NOTE — PROGRESS NOTES
I have spoken with the patient and son.    I have reviewed the notes, assessments, and/or procedures performed by Dr. Madeline Lin, I concur with her  documentation and assessment and plan for Brittnee Boyd.          This document has been electronically signed by Christopher Sunshine MD on December 31, 2020 09:02 CST

## 2021-01-01 PROCEDURE — U0003 INFECTIOUS AGENT DETECTION BY NUCLEIC ACID (DNA OR RNA); SEVERE ACUTE RESPIRATORY SYNDROME CORONAVIRUS 2 (SARS-COV-2) (CORONAVIRUS DISEASE [COVID-19]), AMPLIFIED PROBE TECHNIQUE, MAKING USE OF HIGH THROUGHPUT TECHNOLOGIES AS DESCRIBED BY CMS-2020-01-R: HCPCS | Performed by: FAMILY MEDICINE

## 2021-01-05 DIAGNOSIS — M54.50 ACUTE BILATERAL LOW BACK PAIN WITHOUT SCIATICA: ICD-10-CM

## 2021-01-06 RX ORDER — TIZANIDINE HYDROCHLORIDE 2 MG/1
2 CAPSULE, GELATIN COATED ORAL 3 TIMES DAILY PRN
Qty: 90 CAPSULE | Refills: 0 | OUTPATIENT
Start: 2021-01-06

## 2021-01-06 NOTE — TELEPHONE ENCOUNTER
Patient got this prescription on 12/30/2020. This would indicate she took 12 pills/day which exceeds the recommendation of tid. She would need to return to get reevaluated.

## 2021-02-02 ENCOUNTER — OFFICE VISIT (OUTPATIENT)
Dept: FAMILY MEDICINE CLINIC | Facility: CLINIC | Age: 46
End: 2021-02-02

## 2021-02-02 VITALS
BODY MASS INDEX: 25.66 KG/M2 | DIASTOLIC BLOOD PRESSURE: 72 MMHG | HEART RATE: 74 BPM | OXYGEN SATURATION: 99 % | HEIGHT: 62 IN | TEMPERATURE: 97.1 F | WEIGHT: 139.44 LBS | SYSTOLIC BLOOD PRESSURE: 120 MMHG

## 2021-02-02 DIAGNOSIS — R07.81 PLEURITIC CHEST PAIN: Primary | ICD-10-CM

## 2021-02-02 DIAGNOSIS — F51.01 PRIMARY INSOMNIA: ICD-10-CM

## 2021-02-02 PROCEDURE — 99213 OFFICE O/P EST LOW 20 MIN: CPT | Performed by: STUDENT IN AN ORGANIZED HEALTH CARE EDUCATION/TRAINING PROGRAM

## 2021-02-02 RX ORDER — GUAIFENESIN/DEXTROMETHORPHAN 100-10MG/5
5 SYRUP ORAL 3 TIMES DAILY PRN
Qty: 473 ML | Refills: 2 | Status: SHIPPED | OUTPATIENT
Start: 2021-02-02 | End: 2021-02-17

## 2021-02-02 RX ORDER — TRAZODONE HYDROCHLORIDE 50 MG/1
50 TABLET ORAL NIGHTLY
Qty: 30 TABLET | Refills: 2 | Status: SHIPPED | OUTPATIENT
Start: 2021-02-02 | End: 2021-02-17

## 2021-02-02 RX ORDER — LIDOCAINE 50 MG/G
OINTMENT TOPICAL
Qty: 150 G | Refills: 1 | Status: SHIPPED | OUTPATIENT
Start: 2021-02-02 | End: 2021-02-17

## 2021-02-03 NOTE — PROGRESS NOTES
Family Medicine Residency  Armin Bills III, MD    Subjective:     Brittnee Boyd is a 45 y.o. female who presents for pleuritic chest pain status post COVID-19.  Primary insomnia.     for Obey, named Mayito ID number [id number]    Patient with pleuritic chest pain of left upper chest.  Patient Covid +1-month ago.  Patient with negligible cough.  Patient reports sharp chest pain and numbness radiating up into her neck region worse when she lays on it.  She has not found anything else that really helps with it and is interested for a CAT scan for Covid lung changes.    Patient with issues sleeping.  Unclear if this is related to COVID-19 or not.  Sleep hygiene not clearly elucidated.  Patient interested in pharmaceutical help to help with sleep    The following portions of the patient's history were reviewed and updated as appropriate: allergies, current medications, past family history, past medical history, past social history, past surgical history and problem list.    Past Medical Hx:  Past Medical History:   Diagnosis Date   • Abdominal pain, epigastric    • Acute pharyngitis    • Cough    • Diarrhea of presumed infectious origin    • Dysuria    • Epigastric pain    • Helicobacter positive gastritis    • Irregular menstrual cycle    • Irregular periods    • Onychomycosis    • Pain in right shoulder    • Pain in unspecified upper arm     BILATERAL   • Paronychia of finger    • Polyp of cervix    • Rhinitis    • Right flank pain    • Right lower quadrant pain    • Upper respiratory infection    • Urinary tract infectious disease        Past Surgical Hx:  Past Surgical History:   Procedure Laterality Date   •  SECTION     • COLONOSCOPY N/A 2020    Procedure: COLONOSCOPY;  Surgeon: Trey Lara MD;  Location: [facility] ENDOSCOPY;  Service: Gastroenterology   • ENDOSCOPY  2014    EGD w/ tube 73860 (Normal esophagus. Erosive in stomach. Biopsy taken. Normal duodenum. Biopsy taken.)   •  "SIGMOIDOSCOPY N/A 3/17/2020    Procedure: SIGMOIDOSCOPY FLEXIBLE with possible hemorrhoid banding March 17;  Surgeon: Trey Lara MD;  Location: [facility] ENDOSCOPY;  Service: Gastroenterology;  Laterality: N/A;  banding x 6   hemmroids       Current Meds:    Current Outpatient Medications:   •  cetirizine (zyrTEC) 10 MG tablet, Take 1 tablet by mouth Daily., Disp: 30 tablet, Rfl: 0  •  guaiFENesin-dextromethorphan (ROBITUSSIN DM) 100-10 MG/5ML syrup, Take 5 mL by mouth 3 (Three) Times a Day As Needed for Cough., Disp: 473 mL, Rfl: 2  •  lidocaine (XYLOCAINE) 5 % ointment, Apply  topically to the appropriate area as directed Every 2 (Two) Hours As Needed for Mild Pain ., Disp: 150 g, Rfl: 1  •  TiZANidine (Zanaflex) 2 MG capsule, Take 1 capsule by mouth 3 (Three) Times a Day As Needed for Muscle Spasms (Back pain)., Disp: 90 capsule, Rfl: 0  •  traZODone (DESYREL) 50 MG tablet, Take 1 tablet by mouth Every Night., Disp: 30 tablet, Rfl: 2    Allergies:  No Known Allergies    Family Hx:  Family History   Problem Relation Age of Onset   • Breast cancer Neg Hx    • Ovarian cancer Neg Hx    • Uterine cancer Neg Hx    • Colon cancer Neg Hx         Social History:  Social History     Socioeconomic History   • Marital status:      Spouse name: Not on file   • Number of children: Not on file   • Years of education: Not on file   • Highest education level: Not on file   Tobacco Use   • Smoking status: Never Smoker   • Smokeless tobacco: Never Used   Substance and Sexual Activity   • Alcohol use: No   • Drug use: No   • Sexual activity: Yes     Partners: Male     Birth control/protection: None       Review of Systems  Review of Systems   Respiratory: Positive for cough.    Cardiovascular: Positive for chest pain.   Neurological: Positive for numbness.   Psychiatric/Behavioral: Positive for sleep disturbance.       Objective:     /72   Pulse 74   Temp 97.1 °F (36.2 °C) (Temporal)   Ht 157.5 cm (62\")   Wt " 63.2 kg (139 lb 7 oz)   SpO2 99%   BMI 25.50 kg/m²   Physical Exam  Constitutional:       General: She is not in acute distress.     Appearance: She is well-developed. She is not diaphoretic.   Neck:      Thyroid: No thyromegaly.   Cardiovascular:      Rate and Rhythm: Normal rate and regular rhythm.      Heart sounds: Normal heart sounds. No murmur. No friction rub. No gallop.    Pulmonary:      Effort: Pulmonary effort is normal. No respiratory distress.      Breath sounds: Normal breath sounds. No wheezing or rales.   Chest:      Chest wall: No tenderness.   Musculoskeletal:      Right lower leg: No edema.      Left lower leg: No edema.   Skin:     General: Skin is warm and dry.      Capillary Refill: Capillary refill takes less than 2 seconds.   Neurological:      Mental Status: She is alert and oriented to person, place, and time.      Cranial Nerves: No cranial nerve deficit.          Assessment/Plan:     Diagnoses and all orders for this visit:    1. Pleuritic chest pain (Primary)  -     guaiFENesin-dextromethorphan (ROBITUSSIN DM) 100-10 MG/5ML syrup; Take 5 mL by mouth 3 (Three) Times a Day As Needed for Cough.  Dispense: 473 mL; Refill: 2  -     lidocaine (XYLOCAINE) 5 % ointment; Apply  topically to the appropriate area as directed Every 2 (Two) Hours As Needed for Mild Pain .  Dispense: 150 g; Refill: 1    2. Primary insomnia  -     traZODone (DESYREL) 50 MG tablet; Take 1 tablet by mouth Every Night.  Dispense: 30 tablet; Refill: 2    1.  Patient with pleuritic chest pain likely positional.  Will give topical lidocaine and help with cough to help decrease in her muscle strain and stretch status post COVID-19.  No indication for any imaging of the lung as we would not do anything with that information at this time.  This was communicated to patient all questions were answered in Mandarin through the .    2.  Patient with primary insomnia of unknown etiology could be related to illness could  be related to nervousness related to Covid or could be primary insomnia we will treat with low-dose trazodone and will need to be followed up for determination if need for pharmacotherapy persists  · Rx changes: As above  · Patient Education: Chest wall pain, pleuritic pain, COVID-19  · Compliance at present is estimated to be good.   · Efforts to improve compliance (if necessary) will be directed at increased exercise.    Depression screening: Up to date; last screen 1/28/2020     Follow-up:     Return in about 1 month (around 3/2/2021) for post covid cough and insomnia.    Preventative:  Health Maintenance   Topic Date Due   • TDAP/TD VACCINES (1 - Tdap) 02/22/1992   • INFLUENZA VACCINE  08/01/2020   • ANNUAL PHYSICAL  12/31/2021   • PAP SMEAR  01/28/2023   • COLONOSCOPY  01/21/2030   • HEPATITIS C SCREENING  Completed   • Pneumococcal Vaccine 0-64  Aged Out   • MENINGOCOCCAL VACCINE  Aged Out     Female Preventative: Exercises regularly  Recommended: none  Vaccine Counseling: N/A    Weight  -Class: Normal: 18.5-24.9kg/m2  -Patient's Body mass index is 25.5 kg/m². BMI is within normal parameters. No follow-up required..   eat more fruits and vegetables, decrease soda or juice intake, increase water intake, increase physical activity, reduce screen time, reduce portion size, cut out extra servings, reduce fast food intake, family to eat at dinner table more often, keep TV off during meals, plan meals, eat breakfast and have 3 meals a day    Alcohol use:  reports no history of alcohol use.  Nicotine status  reports that she has never smoked. She has never used smokeless tobacco.    Goals    None         RISK SCORE: 3            This document has been electronically signed by Armin Bills III, MD on February 2, 2021 21:14 CST      Dragon disclaimer: Parts of this note were transcribed using dragon dictation software.

## 2021-02-04 NOTE — PROGRESS NOTES
I have spoken with the patient .   I have reviewed the notes, assessments, and/or procedures performed by Dr. Armin Bills, I concur with his  documentation and assessment and plan for Brittnee Boyd.          This document has been electronically signed by Christopher Sunshine MD on February 4, 2021 10:16 CST

## 2021-02-17 PROCEDURE — 87635 SARS-COV-2 COVID-19 AMP PRB: CPT | Performed by: FAMILY MEDICINE

## 2021-02-24 ENCOUNTER — OFFICE VISIT (OUTPATIENT)
Dept: FAMILY MEDICINE CLINIC | Facility: CLINIC | Age: 46
End: 2021-02-24

## 2021-02-24 VITALS
OXYGEN SATURATION: 97 % | BODY MASS INDEX: 25.56 KG/M2 | TEMPERATURE: 97.5 F | SYSTOLIC BLOOD PRESSURE: 118 MMHG | HEIGHT: 62 IN | HEART RATE: 77 BPM | DIASTOLIC BLOOD PRESSURE: 74 MMHG | WEIGHT: 138.89 LBS

## 2021-02-24 DIAGNOSIS — M54.50 ACUTE RIGHT-SIDED LOW BACK PAIN WITHOUT SCIATICA: Primary | ICD-10-CM

## 2021-02-24 DIAGNOSIS — M25.511 ACUTE PAIN OF RIGHT SHOULDER: ICD-10-CM

## 2021-02-24 PROCEDURE — 99213 OFFICE O/P EST LOW 20 MIN: CPT | Performed by: STUDENT IN AN ORGANIZED HEALTH CARE EDUCATION/TRAINING PROGRAM

## 2021-02-24 RX ORDER — MELOXICAM 7.5 MG/1
7.5 TABLET ORAL DAILY
Qty: 30 TABLET | Refills: 1 | Status: SHIPPED | OUTPATIENT
Start: 2021-02-24 | End: 2021-04-13 | Stop reason: SDUPTHER

## 2021-02-24 NOTE — PROGRESS NOTES
Family Medicine Residency  Madeline Lin MD    Subjective:     Brittnee Boyd is a 46 y.o. female who presents for right-sided back pain and right-sided shoulder pain.    Patient is fluent in Mandarin, iPad  was used twice during this visit, along with her son who speaks some English to conduct the visit.    Right-sided back pain  Patient states that she has had 1 month of persistent right-sided low back pain.  She states that it wakes her up at 5:00 every morning and she has trouble falling back asleep so she gets up for her day.  Patient complains of being tired, and has persistent back pain.  She has not tried taking any over-the-counter anti-inflammatories, and would like to know the cause of her back pain.    Right-sided shoulder pain  Patient states that over the last 2 weeks she has had right-sided shoulder pain that is aggravated with movement.  Occasionally she has trouble laying on her right side.  Patient was working as a  at a local restaurant, but has been off work due to Covid.  Of note, patient tested positive for COVID-19 on January 1, 2021.  Retested on 2/17/2021 that came back negative.      The following portions of the patient's history were reviewed and updated as appropriate: allergies, current medications, past family history, past medical history, past social history, past surgical history and problem list.    Past Medical Hx:  Past Medical History:   Diagnosis Date   • Abdominal pain, epigastric    • Acute pharyngitis    • Cough    • Diarrhea of presumed infectious origin    • Dysuria    • Epigastric pain    • Helicobacter positive gastritis    • Irregular menstrual cycle    • Irregular periods    • Onychomycosis    • Pain in right shoulder    • Pain in unspecified upper arm     BILATERAL   • Paronychia of finger    • Polyp of cervix    • Rhinitis    • Right flank pain    • Right lower quadrant pain    • Upper respiratory infection    • Urinary tract infectious disease              Past Surgical Hx:  Past Surgical History:   Procedure Laterality Date   •  SECTION     • COLONOSCOPY N/A 2020    Procedure: COLONOSCOPY;  Surgeon: Trey Lara MD;  Location: [facility] [place] ENDOSCOPY;  Service: Gastroenterology   • ENDOSCOPY  2014    EGD w/ tube 94781 (Normal esophagus. Erosive in stomach. Biopsy taken. Normal duodenum. Biopsy taken.)   • SIGMOIDOSCOPY N/A 3/17/2020    Procedure: SIGMOIDOSCOPY FLEXIBLE with possible hemorrhoid banding ;  Surgeon: Trey Lara MD;  Location: [facility] [place] ENDOSCOPY;  Service: Gastroenterology;  Laterality: N/A;  banding x 6   hemmroids       Current Meds:    Current Outpatient Medications:   •  meloxicam (Mobic) 7.5 MG tablet, Take 1 tablet by mouth Daily., Disp: 30 tablet, Rfl: 1    Allergies:  No Known Allergies    Family Hx:  Family History   Problem Relation Age of Onset   • Breast cancer Neg Hx    • Ovarian cancer Neg Hx    • Uterine cancer Neg Hx    • Colon cancer Neg Hx         Social History:  Social History     Socioeconomic History   • Marital status:      Spouse name: Not on file   • Number of children: Not on file   • Years of education: Not on file   • Highest education level: Not on file   Tobacco Use   • Smoking status: Never Smoker   • Smokeless tobacco: Never Used   Substance and Sexual Activity   • Alcohol use: No   • Drug use: No   • Sexual activity: Yes     Partners: Male     Birth control/protection: None       Review of Systems  Review of Systems   Constitutional: Negative for chills and fever.   HENT: Negative for rhinorrhea and sore throat.    Eyes: Negative for photophobia and visual disturbance.   Respiratory: Negative for cough and shortness of breath.    Cardiovascular: Negative for chest pain and palpitations.   Gastrointestinal: Negative for abdominal pain and nausea.   Genitourinary: Negative for difficulty urinating and dyspareunia.   Musculoskeletal: Positive for arthralgias ( Right shoulder) and  "back pain ( Right lumbar).   Neurological: Negative for light-headedness and headaches.   Psychiatric/Behavioral: Negative for dysphoric mood and sleep disturbance.   All other systems reviewed and are negative.      Objective:   /74   Pulse 77   Temp 97.5 °F (36.4 °C)   Ht 157.5 cm (62.01\")   Wt 63 kg (138 lb 14.2 oz)   SpO2 97%   BMI 25.40 kg/m²     Physical Exam  Vitals signs reviewed.   Constitutional:       Appearance: She is well-developed, well-groomed and overweight.      Interventions: Face mask in place.   HENT:      Head: Normocephalic.      Right Ear: Hearing and external ear normal.      Left Ear: Hearing and external ear normal.      Nose: Nose normal.      Mouth/Throat:      Lips: Pink.      Mouth: Mucous membranes are moist.   Eyes:      General: Lids are normal.      Conjunctiva/sclera: Conjunctivae normal.      Pupils: Pupils are equal, round, and reactive to light.   Neck:      Musculoskeletal: Neck supple.   Cardiovascular:      Rate and Rhythm: Normal rate and regular rhythm.      Pulses: Normal pulses.      Heart sounds: Normal heart sounds. No murmur. No friction rub. No gallop.    Pulmonary:      Effort: Pulmonary effort is normal.      Breath sounds: Normal breath sounds and air entry. No wheezing, rhonchi or rales.   Abdominal:      General: Bowel sounds are normal.      Palpations: Abdomen is soft.      Tenderness: There is no abdominal tenderness.   Musculoskeletal:      Right shoulder: She exhibits tenderness and pain ( Positive Lora). She exhibits normal range of motion, no swelling, no crepitus and no deformity.      Lumbar back: She exhibits tenderness ( Right-sided) and pain ( Right-sided). She exhibits no bony tenderness, no swelling, no edema and no deformity.   Skin:     General: Skin is warm.   Neurological:      Mental Status: She is alert and oriented to person, place, and time.   Psychiatric:         Attention and Perception: Attention and perception normal.    "      Mood and Affect: Mood and affect normal.         Speech: Speech normal.         Behavior: Behavior normal. Behavior is cooperative.         Thought Content: Thought content normal.         Cognition and Memory: Cognition and memory normal.         Judgment: Judgment normal.     Hip Musculoskeletal Exam    General        Constitutional: well-developed    Neurological: alert    Special Tests      Special Tests - Right        Log roll test: negative      ANDREW test (right): negative      Impingement test: negative      Internal rotation: negative      External rotation: negative      Special Tests - Left        Log roll test: negative      ANDREW test (left): negative      Impingement test: negative      Internal rotation: negative      External rotation: negative     Shoulder Musculoskeletal Exam    General        Constitutional: well-developed    Neurological: alert    Palpation      Palpation - Right        Crepitus: no crepitus      Increased warmth: none      Tenderness: present    Range of Motion      Range of Motion - Right        Active ROM: pain      Passive ROM: pain    Strength      Strength - Right        Right shoulder strength is normal.    Special Tests      Special Tests - Right        Rotator Cuff Signs - Right        Lora test: positive      Painful arc test: positive      Biceps/sharron Signs - Right        Clicking/popping: negative      Speed's test: positive       Assessment/Plan:     Diagnoses and all orders for this visit:    1. Acute right-sided low back pain without sciatica (Primary)  -     Ambulatory Referral to Physical Therapy Evaluate and treat  -     meloxicam (Mobic) 7.5 MG tablet; Take 1 tablet by mouth Daily.  Dispense: 30 tablet; Refill: 1    2. Acute pain of right shoulder  -     Ambulatory Referral to Physical Therapy Evaluate and treat  -     meloxicam (Mobic) 7.5 MG tablet; Take 1 tablet by mouth Daily.  Dispense: 30 tablet; Refill: 1      1. Mobic 7.5 mg daily for  inflammation.  Referral to physical therapy for low back pain and right shoulder pain.  Please evaluate and treat.  Recommended the patient to follow a home exercise plan from physical therapy.  Advised the patient if pain worsened and became more intense, to return to clinic or go to the emergency department.    2.  Patient understood and acknowledged education, counseling, medication/treatment benefits and side effects. Risks, complications, and expectations of outcomes were also addressed, discussed, and acknowledged. All questions were answered and addressed.     · Rx changes: As above.  · Patient Education: As above.  · Compliance at present is estimated to be good.   · Efforts to improve compliance (if necessary) will be directed at Continued physician-patient relationship.    Depression screening: Depression screening performed today; result negative; no follow up needed     Follow-up:     Return in about 1 month (around 3/24/2021) for Recheck right back and shoulder pain.    FOLLOW UP: (PT and right shoulder and back.)    Preventative:  Health Maintenance   Topic Date Due   • TDAP/TD VACCINES (1 - Tdap) 02/22/1992   • INFLUENZA VACCINE  08/01/2020   • ANNUAL PHYSICAL  12/31/2021   • PAP SMEAR  01/28/2023   • COLONOSCOPY  01/21/2030   • HEPATITIS C SCREENING  Completed   • Pneumococcal Vaccine 0-64  Aged Out   • MENINGOCOCCAL VACCINE  Aged Out     Female Preventative: We will discuss at follow-up visit.  Recommended: none  Vaccine Counseling: N/A  Interest in Covid Vaccine: Not discussed, patient was positive for Covid January 2021, number to [facility] Covid Vaccine Administration provided to schedule. [phone] option 1.     Weight  -Class: Overweight: 25.0-29.9kg/m2   -Patient's Body mass index is 149.12 kg/m². BMI is above normal parameters. Recommendations include: nutrition counseling and referral to primary care.   eat more fruits and vegetables, decrease soda or juice intake, increase water intake,  increase physical activity, reduce portion size, cut out extra servings, reduce fast food intake, plan meals, and have 3 meals a day    Alcohol use:  reports no history of alcohol use.  Nicotine status  reports that she has never smoked. She has never used smokeless tobacco.    Goals    None         RISK SCORE: 1      This document has been electronically signed by Madeline Lin MD on February 24, 2021 17:42 CST

## 2021-02-26 NOTE — PROGRESS NOTES
I have seen the patient.  I have reviewed the notes, assessments, and/or procedures performed by Madeline Lin MD, I concur with her/his documentation and assessment and plan for Brittnee Boyd.               This document has been electronically signed by Edward Atkinson MD on February 26, 2021 16:23 CST

## 2021-03-03 ENCOUNTER — HOSPITAL ENCOUNTER (OUTPATIENT)
Dept: PHYSICAL THERAPY | Facility: HOSPITAL | Age: 46
Setting detail: THERAPIES SERIES
Discharge: HOME | End: 2021-03-03

## 2021-03-03 DIAGNOSIS — M25.511 ACUTE PAIN OF RIGHT SHOULDER: ICD-10-CM

## 2021-03-03 DIAGNOSIS — M54.50 ACUTE RIGHT-SIDED LOW BACK PAIN WITHOUT SCIATICA: Primary | ICD-10-CM

## 2021-03-03 PROCEDURE — 97162 PT EVAL MOD COMPLEX 30 MIN: CPT

## 2021-03-03 NOTE — THERAPY EVALUATION
Outpatient Physical Therapy Ortho Initial Evaluation  [facility]     Patient Name: Brittnee oByd  : 1975  MRN: [id number]  Today's Date: 3/3/2021      Visit Date: 2021     ATTENDANCE:   SUBJECTIVE IMPROVEMENT: n/a  NEXT MD APPOINTMENT: JONATAN  RECERT DATE: 3/24/2021    THERAPY DIAGNOSIS: Low back and R shoulder pain       Patient Active Problem List   Diagnosis   • Well female exam with routine gynecological exam   • Abnormal uterine bleeding   • Varicose veins of both lower extremities   • Bilateral elbow joint pain   • Lateral epicondylitis of right elbow   • Acute pain of right shoulder   • Right elbow pain   • Rotator cuff syndrome of left shoulder   • Right lower quadrant abdominal pain   • Change in bowel habits   • Anal or rectal pain   • Hemorrhoids   • Annual physical exam   • Acute right-sided low back pain without sciatica        Past Medical History:   Diagnosis Date   • Abdominal pain, epigastric    • Acute pharyngitis    • Cough    • Diarrhea of presumed infectious origin    • Dysuria    • Epigastric pain    • Helicobacter positive gastritis    • Irregular menstrual cycle    • Irregular periods    • Onychomycosis    • Pain in right shoulder    • Pain in unspecified upper arm     BILATERAL   • Paronychia of finger    • Polyp of cervix    • Rhinitis    • Right flank pain    • Right lower quadrant pain    • Upper respiratory infection    • Urinary tract infectious disease         Past Surgical History:   Procedure Laterality Date   •  SECTION     • COLONOSCOPY N/A 2020    Procedure: COLONOSCOPY;  Surgeon: Trey Lara MD;  Location: [facility] ENDOSCOPY;  Service: Gastroenterology   • ENDOSCOPY  2014    EGD w/ tube 65755 (Normal esophagus. Erosive in stomach. Biopsy taken. Normal duodenum. Biopsy taken.)   • SIGMOIDOSCOPY N/A 3/17/2020    Procedure: SIGMOIDOSCOPY FLEXIBLE with possible hemorrhoid banding ;  Surgeon: Trey Lara MD;  Location: [facility]  ENDOSCOPY;  Service: Gastroenterology;  Laterality: N/A;  banding x 6   hemmroids       Visit Dx:     ICD-10-CM ICD-9-CM   1. Acute right-sided low back pain without sciatica  M54.5 724.2   2. Acute pain of right shoulder  M25.511 719.41         Patient History     Row Name 03/03/21 1300             History    Chief Complaint  Pain  -AC      Type of Pain  Back pain;Shoulder pain  -AC      Brief Description of Current Complaint  Pts son interpreting throughout session. Notes that when she puts her hand on her back and presses she has increased pain she also had increased pain with reaching behind back with R arm pain in shoulder increases. She notes increased low back pain with sitting. notes she has not completed any imaging. Pt notes that MD prescribed pain medication which helps only a little bit. notes pain has been present for about 2 months.   -AC      Previous treatment for THIS PROBLEM  Medication  -AC      Patient/Caregiver Goals  Relieve pain;Return to prior level of function  -AC      Current Tobacco Use  no  -AC      Hand Dominance  right-handed  -AC      Occupation/sports/leisure activities  currently unable due to COVID however would like to get back to .   -AC         Pain     Pain Location  Back;Shoulder  -AC      Pain at Present  3;4;6 back 3/4, shoulder 6  -AC      Pain Frequency  Intermittent  -AC      What Performance Factors Make the Current Problem(s) WORSE?  extended laying/sitting, using RUE.   -AC      What Performance Factors Make the Current Problem(s) BETTER?  pain medication, standing.   -AC      Is your sleep disturbed?  Yes  -AC      Difficulties at work?  not currently working.   -AC      Difficulties with ADL's?  none reported.   -AC         Fall Risk Assessment    Any falls in the past year:  No  -AC         Daily Activities    Primary Language  Other (comment)  -AC      Primary Language Comment  son translated for initial evaluation.   -AC      Are you able to read  No  -AC       Are you able to write  No  -AC        User Key  (r) = Recorded By, (t) = Taken By, (c) = Cosigned By    Initials Name Provider Type    AC Rebeca Higgins, PT Physical Therapist          PT Ortho     Row Name 03/03/21 1300       Subjective Comments    Subjective Comments  see pt hx.   -AC       Precautions and Contraindications    Precautions/Limitations  no known precautions/limitations  -AC       Subjective Pain    Able to rate subjective pain?  yes  -AC       Posture/Observations    Posture/Observations Comments  forward head and rounded shoulders with elevated R shoulder. palpation tenderness along the L lumbar paraspinals with pain to L2/3 PA glides only today, hypomobile L2/3 noted today. Pain with R glute palpation. B HS, quads, piriformis and gastrocs have decreased flexibility.   -AC       Shoulder Impingement/Rotator Cuff Special Tests    Lora-Aba Test (RC Lesion vs. Bursitis)  Right:;Positive  -AC    Neer Impingement Test (RC Lesion vs. Bursitis)  Right:;Positive  -AC    Full Can Test (RC Lesion)  Right:;Positive  -AC    Empty Can Test (RC Lesion)  Right:;Negative  -AC    Lift-Off Test (Subscapularis Lesion)  Right:;Positive  -AC    Belly Press (Subscapularis Lesion)  Right:;Negative  -AC       Head/Neck/Trunk    Trunk Flexion AROM  to toes- increased LBP   -AC    Trunk Lt Lateral Flexion AROM  WNL  -AC    Trunk Rt Lateral Flexion AROM  WNL  -AC    Trunk Lt Rotation AROM  limited 50%  -AC    Trunk Rt Rotation AROM  limited 50%- pain in R shoulder  -AC       Right Upper Ext    Rt Upper Extremity Comments   WNL- pain with over head reaching.   -AC       Left Upper Ext    Lt Upper Extremity Comments   WNL  -AC       MMT Right Upper Ext    Rt Upper Extremity Comments   4+/5 with increased pain shoulder flexion, abduction and biceps   -AC       MMT Left Upper Ext    Lt Upper Extremity Comments   4+/5  -AC       MMT Right Lower Ext    Rt Lower Extremity Comments   4/5  -AC       MMT Left Lower Ext     Lt Lower Extremity Comments   4/5  -AC       Sensation    Additional Comments  notes occasional numbness in B 5th digits.   -AC      User Key  (r) = Recorded By, (t) = Taken By, (c) = Cosigned By    Initials Name Provider Type    Rebeca Go PT Physical Therapist                      Therapy Education  Education Details: UE- tband no money, mid/high rows, and lat pull downs. LE- HS, quads and gastroc stretching, clamshells, hip adduction squeezes, and LAQ.  Given: HEP  Program: New  How Provided: Verbal, Demonstration, Written  Provided to: Patient  Level of Understanding: Verbalized, Demonstrated     PT OP Goals     Row Name 03/03/21 1700          PT Short Term Goals    STG Date to Achieve  03/24/21  -AC     STG 1  Pt is independent with HEP.   -AC     STG 1 Progress  New  -AC     STG 2  Pt has no rounded shoulders posture throughout session.   -AC     STG 2 Progress  New  -AC     STG 3  Pt has no trigger point tenderness to lumbar paraspinals and glutes.   -     STG 3 Progress  New  -AC        Long Term Goals    LTG Date to Achieve  04/14/21  -AC     LTG 1  Pt is able to sit for 2+ hours without increased back pain.   -AC     LTG 1 Progress  New  -AC     LTG 2  Pt is able to reach overhead without increased shoulder pain.   -AC     LTG 2 Progress  New  -AC     LTG 3  Pt reports 80% subjective improvement or greater.   -AC     LTG 3 Progress  New  -AC     LTG 4  PT has improved BLE strength to 4+/5.   -AC     LTG 4 Progress  New  -AC        Time Calculation    PT Goal Re-Cert Due Date  03/24/21  -       User Key  (r) = Recorded By, (t) = Taken By, (c) = Cosigned By    Initials Name Provider Type    Rebeca Go PT Physical Therapist          PT Assessment/Plan     Row Name 03/03/21 1700          PT Assessment    Functional Limitations  Impaired locomotion;Impaired gait;Limitation in home management;Limitations in community activities;Limitations in functional capacity and  performance;Performance in leisure activities  -AC     Impairments  Gait;Impaired flexibility;Muscle strength;Pain;Posture  -AC     Assessment Comments  The pt is a 45 y/o female who presents with low back and shoulder pain of new onset in the last 2-3 months. She presents with decreased LE strength bilaterally, and increased pain with RUE MMT. She has full AROM of BUEs however increased pain with R shoulder over head ROM. Pt has +H-K, Neer's testing which notes shoulder impingement which is likely related to the pts pore posture. She has forward head and rounded shoulders posture with mild elevation of the R shoulder conspired to the L. The pt also demonstrates increased pain with palpation of the lumbar paraspinals and glutes today. She has significant pain with PA glides at L2/3. Pt has flexibility limitations in B hamstrings, quads, and piriformis which is addressed with HEP today. The pt will benefit from skilled PT for postural retraining with UE, LE and core strengthening to decrease forward head and rounded shoulders posture as well as increased lumbar lordosis. Pt was educated on HEP and POC and noted no questions at end of session today. Discussed 1x a week POC at this time with HEP to be completed 1-2x daily for UE, LE stretching/strength, will increase to 2x a week if progress is limited or patients pain increases.   -AC     Please refer to paper survey for additional self-reported information  Yes  -AC     Rehab Potential  Good  -AC     Patient/caregiver participated in establishment of treatment plan and goals  Yes  -AC     Patient would benefit from skilled therapy intervention  Yes  -AC        PT Plan    PT Frequency  1x/week;2x/week  -AC     Predicted Duration of Therapy Intervention (PT)  4-6 weeks   -AC     Planned CPT's?  PT EVAL MOD COMPLELITY: 08042;PT THER PROC EA 15 MIN: 62345;PT THER ACT EA 15 MIN: 43339;PT MANUAL THERAPY EA 15 MIN: 84686;PT NEUROMUSC RE-EDUCATION EA 15 MIN: 18204;PT GAIT  TRAINING EA 15 MIN: 59445;PT HOT OR COLD PACK TREAT MCARE;PT ELECTRICAL STIM UNATTEND:   -AC     PT Plan Comments  stretch and strengthening for UE, LEs, and core. postural re-training. manual and modlaities as needed. update HEP.   -AC       User Key  (r) = Recorded By, (t) = Taken By, (c) = Cosigned By    Initials Name Provider Type    AC Rebeca Higgins, PT Physical Therapist            OP Exercises     Row Name 03/03/21 1300             Subjective Comments    Subjective Comments  see pt hx.   -AC         Subjective Pain    Able to rate subjective pain?  yes  -AC        User Key  (r) = Recorded By, (t) = Taken By, (c) = Cosigned By    Initials Name Provider Type    Rebeca Go, PT Physical Therapist                        Outcome Measure Options: Modifed Owestry  Modified Oswestry  Modified Oswestry Score/Comments: 8/50; 16%      Time Calculation:     Start Time: 1300  Stop Time: 1345  Time Calculation (min): 45 min     Therapy Charges for Today     Code Description Service Date Service Provider Modifiers Qty    54124005029  PT EVAL MOD COMPLEXITY 3 3/3/2021 Rebeca Higgins, PT GP 1                   Rebeca Higgins, PT  3/3/2021

## 2021-03-11 ENCOUNTER — APPOINTMENT (OUTPATIENT)
Dept: PHYSICAL THERAPY | Facility: HOSPITAL | Age: 46
End: 2021-03-11

## 2021-03-16 ENCOUNTER — HOSPITAL ENCOUNTER (OUTPATIENT)
Dept: PHYSICAL THERAPY | Facility: HOSPITAL | Age: 46
Setting detail: THERAPIES SERIES
Discharge: HOME | End: 2021-03-16

## 2021-03-16 DIAGNOSIS — M25.511 ACUTE PAIN OF RIGHT SHOULDER: ICD-10-CM

## 2021-03-16 DIAGNOSIS — M54.50 ACUTE RIGHT-SIDED LOW BACK PAIN WITHOUT SCIATICA: Primary | ICD-10-CM

## 2021-03-16 PROCEDURE — 97110 THERAPEUTIC EXERCISES: CPT

## 2021-03-16 NOTE — THERAPY TREATMENT NOTE
Outpatient Physical Therapy Ortho Treatment Note  [facility]     Patient Name: Brittnee Boyd  : 1975  MRN: [id number]  Today's Date: 3/16/2021      Visit Date: 2021     ATTENDANCE: 2/2 (eval+6)  SUBJECTIVE IMPROVEMENT: some better  NEXT MD APPOINTMENT: TBRAFIA  RECERT DATE: 3/24/2021    THERAPY DIAGNOSIS: R shoulder pain and LBP       Visit Dx:    ICD-10-CM ICD-9-CM   1. Acute right-sided low back pain without sciatica  M54.5 724.2   2. Acute pain of right shoulder  M25.511 719.41       Patient Active Problem List   Diagnosis   • Well female exam with routine gynecological exam   • Abnormal uterine bleeding   • Varicose veins of both lower extremities   • Bilateral elbow joint pain   • Lateral epicondylitis of right elbow   • Acute pain of right shoulder   • Right elbow pain   • Rotator cuff syndrome of left shoulder   • Right lower quadrant abdominal pain   • Change in bowel habits   • Anal or rectal pain   • Hemorrhoids   • Annual physical exam   • Acute right-sided low back pain without sciatica        Past Medical History:   Diagnosis Date   • Abdominal pain, epigastric    • Acute pharyngitis    • Cough    • Diarrhea of presumed infectious origin    • Dysuria    • Epigastric pain    • Helicobacter positive gastritis    • Irregular menstrual cycle    • Irregular periods    • Onychomycosis    • Pain in right shoulder    • Pain in unspecified upper arm     BILATERAL   • Paronychia of finger    • Polyp of cervix    • Rhinitis    • Right flank pain    • Right lower quadrant pain    • Upper respiratory infection    • Urinary tract infectious disease         Past Surgical History:   Procedure Laterality Date   •  SECTION     • COLONOSCOPY N/A 2020    Procedure: COLONOSCOPY;  Surgeon: Trey Lara MD;  Location: [facility] ENDOSCOPY;  Service: Gastroenterology   • ENDOSCOPY  2014    EGD w/ tube 26054 (Normal esophagus. Erosive in stomach. Biopsy taken. Normal duodenum. Biopsy  "taken.)   • SIGMOIDOSCOPY N/A 3/17/2020    Procedure: SIGMOIDOSCOPY FLEXIBLE with possible hemorrhoid banding March 17;  Surgeon: Trey Lara MD;  Location: [facility] ENDOSCOPY;  Service: Gastroenterology;  Laterality: N/A;  banding x 6   hemmroids       PT Ortho     Row Name 03/16/21 1100       Subjective Comments    Subjective Comments  Pt's son is present throughout session and intreprts for the pt. She notes that she is doing a bit better and reports that she has been completing HEP since initial evaluation. Reports the shoulder is hurting the most today.   -AC       Precautions and Contraindications    Precautions/Limitations  no known precautions/limitations  -AC       Subjective Pain    Able to rate subjective pain?  yes  -AC    Pre-Treatment Pain Level  3  -AC      User Key  (r) = Recorded By, (t) = Taken By, (c) = Cosigned By    Initials Name Provider Type    Rebeca Go PT Physical Therapist                      PT Assessment/Plan     Row Name 03/16/21 1100          PT Assessment    Assessment Comments  treatment tolerated well today. pr trquired verbal cuing for many activities to complete with slow and controlled movements. She noted decreased shoulder pain and reported was \"feeling better\" after session. Focus on shoulder pain, UE stretching, and scapular strength today as this is where pts pain is greatest today. continues to progress towards goals.   -AC        PT Plan    PT Frequency  1x/week;2x/week  -AC     Predicted Duration of Therapy Intervention (PT)  4-6 weeks   -AC     PT Plan Comments  continue with POC for stretching/strength as tolerated.   -AC       User Key  (r) = Recorded By, (t) = Taken By, (c) = Cosigned By    Initials Name Provider Type    Rebeca Go PT Physical Therapist            OP Exercises     Row Name 03/16/21 1100             Subjective Comments    Subjective Comments  Pt's son is present throughout session and intreprts for the pt. She notes that " "she is doing a bit better and reports that she has been completing HEP since initial evaluation. Reports the shoulder is hurting the most today.   -AC         Subjective Pain    Able to rate subjective pain?  yes  -AC      Pre-Treatment Pain Level  3  -AC      Post-Treatment Pain Level  -- \"feels better\"  -AC         Exercise 1    Exercise Name 1  Pro II- L3   -AC      Time 1  10 minutes   -AC      Additional Comments  BUE/BLE  -AC         Exercise 2    Exercise Name 2  UT stretch   -AC      Sets 2  3  -AC      Time 2  30s  -AC         Exercise 3    Exercise Name 3  LS stretch   -AC      Sets 3  3  -AC      Time 3  30s  -AC         Exercise 4    Exercise Name 4  tband rows  -AC      Sets 4  1  -AC      Reps 4  30  -AC      Additional Comments  red  -AC         Exercise 5    Exercise Name 5  tband shoulder extension   -AC      Sets 5  1  -AC      Reps 5  30  -AC      Additional Comments  red  -AC         Exercise 6    Exercise Name 6  tband high rows  -AC      Sets 6  1  -AC      Reps 6  30  -AC      Additional Comments  red  -AC         Exercise 7    Exercise Name 7  tband no money   -AC      Sets 7  1  -AC      Reps 7  30  -AC      Additional Comments  red  -AC         Exercise 8    Exercise Name 8  doorway pec stretch   -AC      Sets 8  3  -AC      Time 8  30s  -AC         Exercise 9    Exercise Name 9  Shoulder IR/ER   -AC      Sets 9  1  -AC      Reps 9  30  -AC      Additional Comments  yellow- only 20 ER.   -AC         Exercise 10    Exercise Name 10  wall push-ups   -AC      Sets 10  1  -AC      Reps 10  20  -AC         Exercise 11    Exercise Name 11  scap punches  -AC      Sets 11  1  -AC      Reps 11  20   -AC      Additional Comments  2# DB  -AC         Exercise 12    Exercise Name 12  prone I/Y/T  -AC      Reps 12  1  -AC      Time 12  20  -AC      Additional Comments  2# DB- rows/I. 1# DB Y/T  -AC         Exercise 13    Exercise Name 13  ice to go.   -AC        User Key  (r) = Recorded By, (t) = Taken " By, (c) = Cosigned By    Initials Name Provider Type    AC Rebeca Higgins, PT Physical Therapist                       PT OP Goals     Row Name 03/16/21 1100          PT Short Term Goals    STG Date to Achieve  03/24/21  -AC     STG 1  Pt is independent with HEP.   -AC     STG 1 Progress  Ongoing  -AC     STG 2  Pt has no rounded shoulders posture throughout session.   -AC     STG 2 Progress  Ongoing  -AC     STG 3  Pt has no trigger point tenderness to lumbar paraspinals and glutes.   -AC     STG 3 Progress  Ongoing  -AC        Long Term Goals    LTG Date to Achieve  04/14/21  -AC     LTG 1  Pt is able to sit for 2+ hours without increased back pain.   -AC     LTG 1 Progress  Ongoing  -AC     LTG 2  Pt is able to reach overhead without increased shoulder pain.   -AC     LTG 2 Progress  Ongoing  -AC     LTG 3  Pt reports 80% subjective improvment or greater.   -AC     LTG 3 Progress  Ongoing  -AC     LTG 4  PT has improved BLE strength to 4+/5.   -AC     LTG 4 Progress  Ongoing  -AC        Time Calculation    PT Goal Re-Cert Due Date  03/24/21  -       User Key  (r) = Recorded By, (t) = Taken By, (c) = Cosigned By    Initials Name Provider Type    AC Rebeca Higgins, PT Physical Therapist                         Time Calculation:   Start Time: 1103  Stop Time: 1141  Time Calculation (min): 38 min  Therapy Charges for Today     Code Description Service Date Service Provider Modifiers Qty    53911904025 HC PT THER PROC EA 15 MIN 3/16/2021 Rebeca Higgins, PT GP 3                    Rebeca Higgins PT  3/16/2021

## 2021-03-23 ENCOUNTER — HOSPITAL ENCOUNTER (OUTPATIENT)
Dept: PHYSICAL THERAPY | Facility: HOSPITAL | Age: 46
Setting detail: THERAPIES SERIES
Discharge: HOME | End: 2021-03-23

## 2021-03-23 DIAGNOSIS — M25.511 ACUTE PAIN OF RIGHT SHOULDER: ICD-10-CM

## 2021-03-23 DIAGNOSIS — M54.50 ACUTE RIGHT-SIDED LOW BACK PAIN WITHOUT SCIATICA: Primary | ICD-10-CM

## 2021-03-23 PROCEDURE — 97110 THERAPEUTIC EXERCISES: CPT

## 2021-03-23 NOTE — THERAPY TREATMENT NOTE
Outpatient Physical Therapy Ortho Treatment Note  [facility]     Patient Name: Brittnee Boyd  : 1975  MRN: [id number]  Today's Date: 3/23/2021      Visit Date: 2021     ATTENDANCE: 3/3 (eval+6 approved)  SUBJECTIVE IMPROVEMENT: not assessed this date.   NEXT MD APPOINTMENT: TBD  RECERT DATE: 3/24/    THERAPY DIAGNOSIS: R shoulder pain and Low back pain       Visit Dx:    ICD-10-CM ICD-9-CM   1. Acute right-sided low back pain without sciatica  M54.5 724.2   2. Acute pain of right shoulder  M25.511 719.41       Patient Active Problem List   Diagnosis   • Well female exam with routine gynecological exam   • Abnormal uterine bleeding   • Varicose veins of both lower extremities   • Bilateral elbow joint pain   • Lateral epicondylitis of right elbow   • Acute pain of right shoulder   • Right elbow pain   • Rotator cuff syndrome of left shoulder   • Right lower quadrant abdominal pain   • Change in bowel habits   • Anal or rectal pain   • Hemorrhoids   • Annual physical exam   • Acute right-sided low back pain without sciatica        Past Medical History:   Diagnosis Date   • Abdominal pain, epigastric    • Acute pharyngitis    • Cough    • Diarrhea of presumed infectious origin    • Dysuria    • Epigastric pain    • Helicobacter positive gastritis    • Irregular menstrual cycle    • Irregular periods    • Onychomycosis    • Pain in right shoulder    • Pain in unspecified upper arm     BILATERAL   • Paronychia of finger    • Polyp of cervix    • Rhinitis    • Right flank pain    • Right lower quadrant pain    • Upper respiratory infection    • Urinary tract infectious disease         Past Surgical History:   Procedure Laterality Date   •  SECTION     • COLONOSCOPY N/A 2020    Procedure: COLONOSCOPY;  Surgeon: Trey Lara MD;  Location: [facility] ENDOSCOPY;  Service: Gastroenterology   • ENDOSCOPY  2014    EGD w/ tube 45609 (Normal esophagus. Erosive in stomach. Biopsy taken.  Normal duodenum. Biopsy taken.)   • SIGMOIDOSCOPY N/A 3/17/2020    Procedure: SIGMOIDOSCOPY FLEXIBLE with possible hemorrhoid banding March 17;  Surgeon: Trey Lara MD;  Location: [facility] ENDOSCOPY;  Service: Gastroenterology;  Laterality: N/A;  banding x 6   hemmroids       PT Ortho     Row Name 03/23/21 1100       Subjective Comments    Subjective Comments  Pts son present to translate per pt preference. Pt notes pain is improving.   -AC       Precautions and Contraindications    Precautions/Limitations  no known precautions/limitations  -AC       Subjective Pain    Able to rate subjective pain?  yes  -AC    Pre-Treatment Pain Level  2  -AC    Post-Treatment Pain Level  2  -AC    Subjective Pain Comment  mostly shoulder. back only a little.   -AC      User Key  (r) = Recorded By, (t) = Taken By, (c) = Cosigned By    Initials Name Provider Type    Rebeca Go, PT Physical Therapist                      PT Assessment/Plan     Row Name 03/23/21 1100          PT Assessment    Assessment Comments  treatment tolerated well today reviewed HEP/UE exercises completed last session for home and completed some LE exercises/core exercises for low back pain. Pt showed good understanding and pts son took pictures of pt completing all exercises today so that she could continue to complete them at home.   -AC        PT Plan    PT Frequency  1x/week;2x/week  -AC     Predicted Duration of Therapy Intervention (PT)  4-6 weeks   -AC     PT Plan Comments  continue with POC for   -AC       User Key  (r) = Recorded By, (t) = Taken By, (c) = Cosigned By    Initials Name Provider Type    Rebeca Go, PT Physical Therapist            OP Exercises     Row Name 03/23/21 1100             Subjective Comments    Subjective Comments  Pts son present to translate per pt preference. Pt notes pain is improving.   -AC         Subjective Pain    Able to rate subjective pain?  yes  -AC      Pre-Treatment Pain Level  2  -AC       Post-Treatment Pain Level  2  -AC      Subjective Pain Comment  mostly shoulder. back only a little.   -AC         Exercise 1    Exercise Name 1  Pro II- L3  -AC      Time 1  10 min- BLE/BUE   -AC         Exercise 2    Exercise Name 2  UT stretch   -AC      Sets 2  3  -AC      Time 2  30s  -AC         Exercise 3    Exercise Name 3  LS stretch   -AC      Sets 3  3  -AC      Time 3  30s  -AC         Exercise 4    Exercise Name 4  doorway pec stretch   -AC      Sets 4  3  -AC      Time 4  30s  -AC         Exercise 5    Exercise Name 5  wall pushups   -AC      Reps 5  20  -AC         Exercise 6    Exercise Name 6  scap punches   -AC      Reps 6  20  -AC      Additional Comments  2# weight.   -AC         Exercise 7    Exercise Name 7  sidelying ER   -AC      Reps 7  20  -AC      Additional Comments  2#   -AC         Exercise 8    Exercise Name 8  prone I/Y/T/Row   -AC      Reps 8  20 each   -AC      Additional Comments  2#  -AC         Exercise 9    Exercise Name 9  Standing HS stretch   -AC      Sets 9  3  -AC      Time 9  30s  -AC      Additional Comments  BLE  -AC         Exercise 10    Exercise Name 10  standing Gastroc stretch   -AC      Sets 10  3  -AC      Time 10  30s  -AC         Exercise 11    Exercise Name 11  bridges   -AC      Reps 11  20  -AC         Exercise 12    Exercise Name 12  clamshells   -AC      Reps 12  20  -AC      Additional Comments  BLE  -AC         Exercise 13    Exercise Name 13  prone hip extension   -AC      Reps 13  20 BLE  -AC         Exercise 14    Exercise Name 14  tball lower trunk rotation.   -AC      Reps 14  20  -AC      Additional Comments  focus on core strength   -AC         Exercise 15    Exercise Name 15  tball HS curls   -AC      Reps 15  20  -AC      Additional Comments  focus on core strength   -AC         Exercise 16    Exercise Name 16  denies need for ice  -AC        User Key  (r) = Recorded By, (t) = Taken By, (c) = Cosigned By    Initials Name Provider Type    AC  Rebeca Higgins, PT Physical Therapist                       PT OP Goals     Row Name 03/23/21 1100          PT Short Term Goals    STG Date to Achieve  03/24/21  -AC     STG 1  Pt is independent with HEP.   -AC     STG 1 Progress  Ongoing  -AC     STG 2  Pt has no rounded shoulders posture throughout session.   -AC     STG 2 Progress  Ongoing  -AC     STG 3  Pt has no trigger point tenderness to lumbar paraspinals and glutes.   -AC     STG 3 Progress  Ongoing  -AC        Long Term Goals    LTG Date to Achieve  04/14/21  -AC     LTG 1  Pt is able to sit for 2+ hours without increased back pain.   -AC     LTG 1 Progress  Ongoing  -AC     LTG 2  Pt is able to reach overhead without increased shoulder pain.   -AC     LTG 2 Progress  Ongoing  -AC     LTG 3  Pt reports 80% subjective improvement or greater.   -AC     LTG 3 Progress  Ongoing  -AC     LTG 4  PT has improved BLE strength to 4+/5.   -AC     LTG 4 Progress  Ongoing  -AC        Time Calculation    PT Goal Re-Cert Due Date  03/24/21  -       User Key  (r) = Recorded By, (t) = Taken By, (c) = Cosigned By    Initials Name Provider Type    AC Rebeca Higgins, PT Physical Therapist                         Time Calculation:   Start Time: 1102  Stop Time: 1145  Time Calculation (min): 43 min  Therapy Charges for Today     Code Description Service Date Service Provider Modifiers Qty    39044367546 HC PT THER PROC EA 15 MIN 3/23/2021 Rebeca Higgins, PT GP 3                    Rebeca Higgins, PT  3/23/2021

## 2021-03-30 ENCOUNTER — HOSPITAL ENCOUNTER (OUTPATIENT)
Dept: PHYSICAL THERAPY | Facility: HOSPITAL | Age: 46
Setting detail: THERAPIES SERIES
Discharge: HOME | End: 2021-03-30

## 2021-03-30 DIAGNOSIS — M54.50 ACUTE RIGHT-SIDED LOW BACK PAIN WITHOUT SCIATICA: Primary | ICD-10-CM

## 2021-03-30 DIAGNOSIS — M25.511 ACUTE PAIN OF RIGHT SHOULDER: ICD-10-CM

## 2021-03-30 PROCEDURE — 97110 THERAPEUTIC EXERCISES: CPT

## 2021-03-30 NOTE — THERAPY RE-EVALUATION
Outpatient Physical Therapy Ortho Re-Evaluation  [facility]     Patient Name: Brittnee Boyd  : 1975  MRN: [id number]  Today's Date: 3/30/2021      Visit Date: 2021     ATTENDANCE:  (eval+6)  SUBJECTIVE IMPROVEMENT: 70%  NEXT MD APPOINTMENT: JONATAN  RECERT DATE: 2021    THERAPY DIAGNOSIS: R shoulder pain       Patient Active Problem List   Diagnosis   • Well female exam with routine gynecological exam   • Abnormal uterine bleeding   • Varicose veins of both lower extremities   • Bilateral elbow joint pain   • Lateral epicondylitis of right elbow   • Acute pain of right shoulder   • Right elbow pain   • Rotator cuff syndrome of left shoulder   • Right lower quadrant abdominal pain   • Change in bowel habits   • Anal or rectal pain   • Hemorrhoids   • Annual physical exam   • Acute right-sided low back pain without sciatica        Past Medical History:   Diagnosis Date   • Abdominal pain, epigastric    • Acute pharyngitis    • Cough    • Diarrhea of presumed infectious origin    • Dysuria    • Epigastric pain    • Helicobacter positive gastritis    • Irregular menstrual cycle    • Irregular periods    • Onychomycosis    • Pain in right shoulder    • Pain in unspecified upper arm     BILATERAL   • Paronychia of finger    • Polyp of cervix    • Rhinitis    • Right flank pain    • Right lower quadrant pain    • Upper respiratory infection    • Urinary tract infectious disease         Past Surgical History:   Procedure Laterality Date   •  SECTION     • COLONOSCOPY N/A 2020    Procedure: COLONOSCOPY;  Surgeon: Trey Lara MD;  Location: [facility] ENDOSCOPY;  Service: Gastroenterology   • ENDOSCOPY  2014    EGD w/ tube 72853 (Normal esophagus. Erosive in stomach. Biopsy taken. Normal duodenum. Biopsy taken.)   • SIGMOIDOSCOPY N/A 3/17/2020    Procedure: SIGMOIDOSCOPY FLEXIBLE with possible hemorrhoid banding ;  Surgeon: Trey Lara MD;  Location: [facility]  ENDOSCOPY;  Service: Gastroenterology;  Laterality: N/A;  banding x 6   hemmroids       Visit Dx:     ICD-10-CM ICD-9-CM   1. Acute right-sided low back pain without sciatica  M54.5 724.2   2. Acute pain of right shoulder  M25.511 719.41             PT Ortho     Row Name 03/30/21 1300       Subjective Comments    Subjective Comments  Pts son present and translates for pt throughout appointment per pt preference. She reports that he continues to feel better at this time. reports that back does not have pain any more and she nolonger has to get up and move around after extended sitting. She notes she is able to do everything without increased back pain at this time.  Notes that she is continuing HEP at home throughout the week which seems to be helping. Pt reports 70% subjective improvement.   -AC       Precautions and Contraindications    Precautions/Limitations  no known precautions/limitations  -       Subjective Pain    Able to rate subjective pain?  yes  -    Pre-Treatment Pain Level  2  -    Subjective Pain Comment  shoulder only   -      User Key  (r) = Recorded By, (t) = Taken By, (c) = Cosigned By    Initials Name Provider Type    AC Rebeca Higgins, PT Physical Therapist                            PT OP Goals     Row Name 03/30/21 1300          PT Short Term Goals    STG Date to Achieve  03/24/21  -     STG 1  Pt is independent with HEP.   -     STG 1 Progress  Met;Ongoing  -     STG 2  Pt has no rounded shoulders posture throughout session.   -     STG 2 Progress  Met  -     STG 2 Progress Comments  improved posture noted throughout session today.   -     STG 3  Pt has no trigger point tenderness to lumbar paraspinals and glutes.   -     STG 3 Progress  Met  -        Long Term Goals    LTG Date to Achieve  04/14/21  -     LTG 1  Pt is able to sit for 2+ hours without increased back pain.   -     LTG 1 Progress  Met  -     LTG 2  Pt is able to reach overhead without increased  shoulder pain.   -     LTG 2 Progress  Not Met  -     LTG 2 Progress Comments  pt continues to report pain with over head reaching.   -     LTG 3  Pt reports 80% subjective improvement or greater.   -     LTG 3 Progress  Not Met  -     LTG 3 Progress Comments  70% subjective improvement.   -     LTG 4  PT has improved BLE strength to 4+/5.   -     LTG 4 Progress  Met  -       User Key  (r) = Recorded By, (t) = Taken By, (c) = Cosigned By    Initials Name Provider Type    Rebeca Go, PT Physical Therapist          PT Assessment/Plan     Row Name 03/30/21 1400          PT Assessment    Assessment Comments  Pt unable to make session next week so will be pushed out to 4/13 per request. Re-evaluation completed today with all STGs met and good compliance to HEP noted at this time. R shoulder pain continues to decrease. HEP added to include shoulder tband 6-way today. LTGs 1 and 4 met with significant improvements seen with low back pain at this time and pt reports of low back pain resolved. Pt continues to remain appropriate for skilled P To improve UE and scapular strength to decrease R shoulder pain and improve function. She shows improved posture this date with minimal demo of rounded shoulders today.   -        PT Plan    PT Frequency  1x/week  -     Predicted Duration of Therapy Intervention (PT)  4-6 weeks   -     PT Plan Comments  continue withPOC with focus on UE and scapula strength to decrease R shoulder pain.   -       User Key  (r) = Recorded By, (t) = Taken By, (c) = Cosigned By    Initials Name Provider Type    Rebeca Go, PT Physical Therapist            OP Exercises     Row Name 03/30/21 1300             Subjective Comments    Subjective Comments  Pts son present and translates for pt throughout appointment per pt preference. She reports that he continues to feel better at this time. reports that back does not have pain any more and she nolonger has to get up  and move around after extended sitting. She notes she is able to do everything without increased back pain at this time.  Notes that she is continuing HEP at home throughout the week which seems to be helping. Pt reports 70% subjective improvement.   -AC         Subjective Pain    Able to rate subjective pain?  yes  -AC      Pre-Treatment Pain Level  2  -AC      Subjective Pain Comment  shoulder only   -AC         Exercise 1    Exercise Name 1  Pro II- L4  -AC      Time 1  10 min  -AC      Additional Comments  BUE/BLE  -AC         Exercise 2    Exercise Name 2  UT stretch   -AC      Sets 2  3  -AC      Time 2  30s  -AC      Additional Comments  bilat  -AC         Exercise 3    Exercise Name 3  LS stretch   -AC      Sets 3  3  -AC      Time 3  30s  -AC      Additional Comments  bilat  -AC         Exercise 4    Exercise Name 4  doorway pec stretch   -AC      Sets 4  3  -AC      Time 4  30s  -AC         Exercise 5    Exercise Name 5  scap punches   -AC      Reps 5  20  -AC      Additional Comments  3# DB  -AC         Exercise 6    Exercise Name 6  isometric shoulder taps   -AC      Time 6  1 min  -AC         Exercise 7    Exercise Name 7  sidelying shoulder ER   -AC      Reps 7  20  -AC      Additional Comments  3#  -AC         Exercise 8    Exercise Name 8  prone I/Y/T/Row  -AC      Reps 8  20 each   -AC      Additional Comments  3# for all- increased reported difficulty with T/Y.   -AC         Exercise 9    Exercise Name 9  tband no money   -AC      Time 9  20   -AC      Additional Comments  red  -AC         Exercise 10    Exercise Name 10  shoulder tband 6-way   -AC      Reps 10  20 each   -AC      Additional Comments  red  -AC         Exercise 11    Exercise Name 11  wall pushups   -AC      Reps 11  20  -AC         Exercise 12    Exercise Name 12  pt denies ice   -AC        User Key  (r) = Recorded By, (t) = Taken By, (c) = Cosigned By    Initials Name Provider Type    AC Rebeca Higgins, PT Physical Therapist                                   Time Calculation:     Start Time: 1342  Stop Time: 1425  Time Calculation (min): 43 min     Therapy Charges for Today     Code Description Service Date Service Provider Modifiers Qty    25131733687 HC PT THER PROC EA 15 MIN 3/30/2021 Rebeca Higgins, PT GP 3                    Rebeca Higgins, PT  3/30/2021

## 2021-04-06 ENCOUNTER — APPOINTMENT (OUTPATIENT)
Dept: PHYSICAL THERAPY | Facility: HOSPITAL | Age: 46
End: 2021-04-06

## 2021-04-13 ENCOUNTER — OFFICE VISIT (OUTPATIENT)
Dept: FAMILY MEDICINE CLINIC | Facility: CLINIC | Age: 46
End: 2021-04-13

## 2021-04-13 ENCOUNTER — HOSPITAL ENCOUNTER (OUTPATIENT)
Dept: PHYSICAL THERAPY | Facility: HOSPITAL | Age: 46
Setting detail: THERAPIES SERIES
Discharge: HOME | End: 2021-04-13

## 2021-04-13 VITALS
DIASTOLIC BLOOD PRESSURE: 72 MMHG | HEART RATE: 72 BPM | HEIGHT: 62 IN | BODY MASS INDEX: 25.17 KG/M2 | OXYGEN SATURATION: 96 % | WEIGHT: 136.8 LBS | TEMPERATURE: 95.7 F | SYSTOLIC BLOOD PRESSURE: 122 MMHG

## 2021-04-13 DIAGNOSIS — R14.2 BURPING: ICD-10-CM

## 2021-04-13 DIAGNOSIS — M25.511 ACUTE PAIN OF RIGHT SHOULDER: ICD-10-CM

## 2021-04-13 DIAGNOSIS — M54.50 ACUTE RIGHT-SIDED LOW BACK PAIN WITHOUT SCIATICA: Primary | ICD-10-CM

## 2021-04-13 DIAGNOSIS — K21.9 GASTROESOPHAGEAL REFLUX DISEASE, UNSPECIFIED WHETHER ESOPHAGITIS PRESENT: ICD-10-CM

## 2021-04-13 PROCEDURE — 99213 OFFICE O/P EST LOW 20 MIN: CPT | Performed by: STUDENT IN AN ORGANIZED HEALTH CARE EDUCATION/TRAINING PROGRAM

## 2021-04-13 PROCEDURE — 97110 THERAPEUTIC EXERCISES: CPT

## 2021-04-13 RX ORDER — MELOXICAM 7.5 MG/1
7.5 TABLET ORAL DAILY
Qty: 30 TABLET | Refills: 1 | Status: SHIPPED | OUTPATIENT
Start: 2021-04-13 | End: 2022-08-23 | Stop reason: HOSPADM

## 2021-04-13 RX ORDER — OMEPRAZOLE 40 MG/1
40 CAPSULE, DELAYED RELEASE ORAL DAILY
Qty: 30 CAPSULE | Refills: 0 | Status: SHIPPED | OUTPATIENT
Start: 2021-04-13 | End: 2022-06-29

## 2021-04-13 NOTE — PROGRESS NOTES
Subjective   Brittnee Boyd is a 46 y.o. female who presents for follow up for back pain. Was seen previously by Dr. Lin and prescribed NSAID and PT at that time.       Past Medical Hx:  Past Medical History:   Diagnosis Date   • Abdominal pain, epigastric    • Acute pharyngitis    • Cough    • Diarrhea of presumed infectious origin    • Dysuria    • Epigastric pain    • Helicobacter positive gastritis    • Irregular menstrual cycle    • Irregular periods    • Onychomycosis    • Pain in right shoulder    • Pain in unspecified upper arm     BILATERAL   • Paronychia of finger    • Polyp of cervix    • Rhinitis    • Right flank pain    • Right lower quadrant pain    • Upper respiratory infection    • Urinary tract infectious disease        Past Surgical Hx:  Past Surgical History:   Procedure Laterality Date   •  SECTION     • COLONOSCOPY N/A 2020    Procedure: COLONOSCOPY;  Surgeon: Trey Lara MD;  Location: [facility] ENDOSCOPY;  Service: Gastroenterology   • ENDOSCOPY  2014    EGD w/ tube 21251 (Normal esophagus. Erosive in stomach. Biopsy taken. Normal duodenum. Biopsy taken.)   • SIGMOIDOSCOPY N/A 3/17/2020    Procedure: SIGMOIDOSCOPY FLEXIBLE with possible hemorrhoid banding ;  Surgeon: Trey Lara MD;  Location: [facility] ENDOSCOPY;  Service: Gastroenterology;  Laterality: N/A;  banding x 6   hemmroids       Health Maintenance:  Health Maintenance   Topic Date Due   • COVID-19 Vaccine (1) Never done   • TDAP/TD VACCINES (1 - Tdap) Never done   • INFLUENZA VACCINE  2021   • ANNUAL PHYSICAL  2021   • PAP SMEAR  2023   • COLONOSCOPY  2030   • HEPATITIS C SCREENING  Completed   • Pneumococcal Vaccine 0-64  Aged Out       Current Meds:    Current Outpatient Medications:   •  fluticasone (FLONASE) 50 MCG/ACT nasal spray, 2 sprays into the nostril(s) as directed by provider Daily for 7 days., Disp: 9.9 mL, Rfl: 0  •  meloxicam (Mobic) 7.5 MG  tablet, Take 1 tablet by mouth Daily., Disp: 30 tablet, Rfl: 1  •  omeprazole (priLOSEC) 40 MG capsule, Take 1 capsule by mouth Daily., Disp: 30 capsule, Rfl: 0    Allergies:  Patient has no known allergies.    Family Hx:  Family History   Problem Relation Age of Onset   • Breast cancer Neg Hx    • Ovarian cancer Neg Hx    • Uterine cancer Neg Hx    • Colon cancer Neg Hx         Social History:  Social History     Socioeconomic History   • Marital status:      Spouse name: Not on file   • Number of children: Not on file   • Years of education: Not on file   • Highest education level: Not on file   Tobacco Use   • Smoking status: Never Smoker   • Smokeless tobacco: Never Used   Substance and Sexual Activity   • Alcohol use: No   • Drug use: No   • Sexual activity: Yes     Partners: Male     Birth control/protection: None       Review of Systems  Review of Systems   Constitutional: Negative for activity change, chills, diaphoresis and fever.   HENT: Negative for dental problem, drooling, ear discharge, ear pain, facial swelling, mouth sores, nosebleeds, rhinorrhea, sinus pressure, sinus pain, sneezing and sore throat.    Eyes: Negative for pain, discharge and visual disturbance.   Respiratory: Negative for apnea, cough, shortness of breath, wheezing and stridor.    Cardiovascular: Negative for chest pain, palpitations and leg swelling.   Gastrointestinal: Positive for abdominal pain and nausea. Negative for abdominal distention, constipation, diarrhea and vomiting.   Genitourinary: Negative for dysuria, frequency and urgency.   Musculoskeletal: Positive for back pain. Negative for arthralgias.   Skin: Negative for rash and wound.   Neurological: Negative for dizziness, facial asymmetry, light-headedness and headaches.   Psychiatric/Behavioral: Negative for agitation and decreased concentration. The patient is not nervous/anxious.             Objective:     /72   Pulse 72   Temp 95.7 °F (35.4 °C)   Ht  "157.5 cm (62\")   Wt 62.1 kg (136 lb 12.8 oz)   SpO2 96%   BMI 25.02 kg/m²       Physical Exam  Constitutional:       Appearance: She is well-developed.   HENT:      Head: Normocephalic and atraumatic.      Right Ear: External ear normal.      Left Ear: External ear normal.      Nose: Nose normal.      Mouth/Throat:      Pharynx: No oropharyngeal exudate.   Eyes:      General: No scleral icterus.        Right eye: No discharge.         Left eye: No discharge.      Conjunctiva/sclera: Conjunctivae normal.      Pupils: Pupils are equal, round, and reactive to light.   Neck:      Vascular: No JVD.   Cardiovascular:      Rate and Rhythm: Normal rate and regular rhythm.      Heart sounds: Normal heart sounds. No murmur heard.   No friction rub. No gallop.    Pulmonary:      Effort: Pulmonary effort is normal. No respiratory distress.      Breath sounds: Normal breath sounds. No stridor. No wheezing or rales.   Abdominal:      General: Bowel sounds are normal. There is no distension.      Palpations: Abdomen is soft.      Tenderness: There is no abdominal tenderness.   Musculoskeletal:         General: Tenderness (low back) present. No deformity. Normal range of motion.      Cervical back: Normal range of motion and neck supple.   Skin:     General: Skin is warm and dry.      Capillary Refill: Capillary refill takes less than 2 seconds.      Coloration: Skin is not pale.      Findings: No erythema or rash.   Neurological:      Mental Status: She is alert and oriented to person, place, and time.   Psychiatric:         Behavior: Behavior normal.         Assessment/Plan:     Diagnoses and all orders for this visit:    1. Acute right-sided low back pain without sciatica (Primary)  -     meloxicam (Mobic) 7.5 MG tablet; Take 1 tablet by mouth Daily.  Dispense: 30 tablet; Refill: 1    2. Acute pain of right shoulder  -     meloxicam (Mobic) 7.5 MG tablet; Take 1 tablet by mouth Daily.  Dispense: 30 tablet; Refill: 1    3. " Burping  -     omeprazole (priLOSEC) 40 MG capsule; Take 1 capsule by mouth Daily.  Dispense: 30 capsule; Refill: 0    4. Gastroesophageal reflux disease, unspecified whether esophagitis present  -     omeprazole (priLOSEC) 40 MG capsule; Take 1 capsule by mouth Daily.  Dispense: 30 capsule; Refill: 0     Pt endorses much improvement in her back pain with combination of Mobic and PT. She still has 2-3/10 back pain so will continue Mobic for 1 month. She does complain of some burping and epigastric abdominal pain that has been present for >8 weeks. However with the addition of Mobic we will prescribe Prilosec to cover for any complication of ulcer. Pt advised to use Mobic for minimal time as needed. Advised to continue PT.    Follow-up:     Return for Next scheduled follow up.      Preventative:    Vaccines Recommended at this visit:   No Vaccines recommended today. Patient is up to date on all vaccines.     Vaccines Received at this visit:  No Vaccines recommended today. Patient is up to date on all vaccines.     Screenings Recommended at this visit:  No Screenings offered today. Patient is up to date on all screenings at this time.     Screenings Ordered at this visit:  No screenings were offered today. Patient is up to date on all screenings.     Smoking Status:  Patient has never smoked.    Alcohol Intake:  Patient does not drink    Patient's Body mass index is 25.02 kg/m². BMI is within normal parameters. No follow-up required..         RISK SCORE: 3          This document has been electronically signed by Avi Santana MD on April 13, 2021 15:24 CDT

## 2021-04-13 NOTE — THERAPY TREATMENT NOTE
Outpatient Physical Therapy Ortho Treatment Note  [facility]     Patient Name: Brittnee Boyd  : 1975  MRN: [id number]  Today's Date: 2021      Visit Date: 2021     ATTENDANCE: 5/5 (eval+6)  SUBJECTIVE IMPROVEMENT: 70%  NEXT MD APPOINTMENT: [facility]  RECERT DATE: 2021    THERAPY DIAGNOSIS: R shoulder pain, back pain   '    Visit Dx:    ICD-10-CM ICD-9-CM   1. Acute right-sided low back pain without sciatica  M54.5 724.2   2. Acute pain of right shoulder  M25.511 719.41       Patient Active Problem List   Diagnosis   • Well female exam with routine gynecological exam   • Abnormal uterine bleeding   • Varicose veins of both lower extremities   • Bilateral elbow joint pain   • Lateral epicondylitis of right elbow   • Acute pain of right shoulder   • Right elbow pain   • Rotator cuff syndrome of left shoulder   • Right lower quadrant abdominal pain   • Change in bowel habits   • Anal or rectal pain   • Hemorrhoids   • Annual physical exam   • Acute right-sided low back pain without sciatica        Past Medical History:   Diagnosis Date   • Abdominal pain, epigastric    • Acute pharyngitis    • Cough    • Diarrhea of presumed infectious origin    • Dysuria    • Epigastric pain    • Helicobacter positive gastritis    • Irregular menstrual cycle    • Irregular periods    • Onychomycosis    • Pain in right shoulder    • Pain in unspecified upper arm     BILATERAL   • Paronychia of finger    • Polyp of cervix    • Rhinitis    • Right flank pain    • Right lower quadrant pain    • Upper respiratory infection    • Urinary tract infectious disease         Past Surgical History:   Procedure Laterality Date   •  SECTION     • COLONOSCOPY N/A 2020    Procedure: COLONOSCOPY;  Surgeon: Trey Lara MD;  Location: [facility] ENDOSCOPY;  Service: Gastroenterology   • ENDOSCOPY  2014    EGD w/ tube 16684 (Normal esophagus. Erosive in stomach. Biopsy taken. Normal duodenum. Biopsy taken.)    • SIGMOIDOSCOPY N/A 3/17/2020    Procedure: SIGMOIDOSCOPY FLEXIBLE with possible hemorrhoid banding March 17;  Surgeon: Trey Lara MD;  Location: [facility] ENDOSCOPY;  Service: Gastroenterology;  Laterality: N/A;  banding x 6   hemmroids       PT Ortho     Row Name 04/13/21 1300       Precautions and Contraindications    Precautions/Limitations  no known precautions/limitations  -AC       Subjective Pain    Able to rate subjective pain?  yes  -AC    Pre-Treatment Pain Level  2  -AC    Subjective Pain Comment  shoulder/mid back  -AC      User Key  (r) = Recorded By, (t) = Taken By, (c) = Cosigned By    Initials Name Provider Type     Rebeca Higgins, PT Physical Therapist                      PT Assessment/Plan     Row Name 04/13/21 1300          PT Assessment    Assessment Comments  treatment tolerated well today with focus on UE and scapstability for R shoulder pain.   -AC        PT Plan    PT Frequency  1x/week  -AC     Predicted Duration of Therapy Intervention (PT)  4-6 weeks   -AC     PT Plan Comments  continue with POC- 2 more visits approved with insurance.   -AC       User Key  (r) = Recorded By, (t) = Taken By, (c) = Cosigned By    Initials Name Provider Type     Rebeca Higgins, PT Physical Therapist            OP Exercises     Row Name 04/13/21 1300             Subjective Comments    Subjective Comments  Pts son present and translates for pt throughout session. Pt reports doing well today, however mild pain in both back and shoulder noted. continues to feel as if shoulder and back pain are improving.   -AC         Subjective Pain    Able to rate subjective pain?  yes  -AC      Pre-Treatment Pain Level  2  -AC      Subjective Pain Comment  shoulder/mid back  -AC         Exercise 1    Exercise Name 1  Pro II- L4  -AC      Time 1  10 minutes  -AC      Additional Comments  BLE/BUE  -AC         Exercise 2    Exercise Name 2  doorway pec stretch   -AC      Cueing 2  Demo;Tactile  -AC      Sets  2  3  -AC      Time 2  30s  -AC         Exercise 3    Exercise Name 3  tband shoulder 6-way   -AC      Cueing 3  Verbal;Tactile;Demo  -AC      Sets 3  1  -AC      Reps 3  30  -AC      Additional Comments  green   -AC         Exercise 4    Exercise Name 4  scap stability circles   -AC      Sets 4  30cw/30ccw  -AC         Exercise 5    Exercise Name 5  wall push-ups   -AC      Reps 5  30  -AC         Exercise 6    Exercise Name 6  prone I/Y/T/row  -AC      Sets 6  1  -AC      Reps 6  20  -AC      Additional Comments  3# weight  -AC         Exercise 7    Exercise Name 7  open books   -AC      Sets 7  1  -AC      Reps 7  10  -AC      Time 7  hold 10s  -AC        User Key  (r) = Recorded By, (t) = Taken By, (c) = Cosigned By    Initials Name Provider Type    Rebeca Go, PT Physical Therapist                       PT OP Goals     Row Name 04/13/21 1300          PT Short Term Goals    STG Date to Achieve  03/24/21  -     STG 1  Pt is independent with HEP.   -     STG 1 Progress  Met;Ongoing  -     STG 2  Pt has no rounded shoulders posture throughout session.   -     STG 2 Progress  Met  -     STG 3  Pt has no trigger point tenderness to lumbar paraspinals and glutes.   -     STG 3 Progress  Met  -        Long Term Goals    LTG Date to Achieve  04/14/21  -     LTG 1  Pt is able to sit for 2+ hours without increased back pain.   -     LTG 1 Progress  Met  -     LTG 2  Pt is able to reach overhead without increased shoulder pain.   -     LTG 2 Progress  Not Met  -     LTG 3  Pt reports 80% subjective improvment or greater.   -     LTG 3 Progress  Not Met  -     LTG 4  PT has improved BLE strength to 4+/5.   -     LTG 4 Progress  Met  -        Time Calculation    PT Goal Re-Cert Due Date  04/20/21  -       User Key  (r) = Recorded By, (t) = Taken By, (c) = Cosigned By    Initials Name Provider Type    Rebeca Go, PT Physical Therapist                         Time  Calculation:   Start Time: 1300  Stop Time: 1345  Time Calculation (min): 45 min  Therapy Charges for Today     Code Description Service Date Service Provider Modifiers Qty    91195670650 HC PT THER PROC EA 15 MIN 4/13/2021 Rebeca Higgins, PT GP 3                    Rebeca Higgins, PT  4/13/2021

## 2021-04-15 NOTE — PROGRESS NOTES
I have seen the patient.  I have reviewed the notes, assessments, and/or procedures performed by Dr. GWENDOLYN Santana, I concur with her/his documentation and assessment and plan for Brittnee Boyd.               This document has been electronically signed by Edward Atkinson MD on April 15, 2021 14:30 CDT

## 2021-04-20 ENCOUNTER — HOSPITAL ENCOUNTER (OUTPATIENT)
Dept: PHYSICAL THERAPY | Facility: HOSPITAL | Age: 46
Setting detail: THERAPIES SERIES
Discharge: HOME | End: 2021-04-20

## 2021-04-20 DIAGNOSIS — M54.50 ACUTE RIGHT-SIDED LOW BACK PAIN WITHOUT SCIATICA: Primary | ICD-10-CM

## 2021-04-20 DIAGNOSIS — M25.511 ACUTE PAIN OF RIGHT SHOULDER: ICD-10-CM

## 2021-04-20 PROCEDURE — 97110 THERAPEUTIC EXERCISES: CPT

## 2021-04-20 NOTE — THERAPY DISCHARGE NOTE
Outpatient Physical Therapy Ortho Treatment Note/Discharge Summary  [facility]     Patient Name: Brittnee Boyd  : 1975  MRN: [id number]  Today's Date: 2021      Visit Date: 2021     ATTENDANCE:    SUBJECTIVE IMPROVEMENT: 95%  NEXT MD APPOINTMENT: TBD  RECERT DATE: d/c    THERAPY DIAGNOSIS: R shoulder pain, back pain       Visit Dx:    ICD-10-CM ICD-9-CM   1. Acute right-sided low back pain without sciatica  M54.5 724.2   2. Acute pain of right shoulder  M25.511 719.41       Patient Active Problem List   Diagnosis   • Well female exam with routine gynecological exam   • Abnormal uterine bleeding   • Varicose veins of both lower extremities   • Bilateral elbow joint pain   • Lateral epicondylitis of right elbow   • Acute pain of right shoulder   • Right elbow pain   • Rotator cuff syndrome of left shoulder   • Right lower quadrant abdominal pain   • Change in bowel habits   • Anal or rectal pain   • Hemorrhoids   • Annual physical exam   • Acute right-sided low back pain without sciatica        Past Medical History:   Diagnosis Date   • Abdominal pain, epigastric    • Acute pharyngitis    • Cough    • Diarrhea of presumed infectious origin    • Dysuria    • Epigastric pain    • Helicobacter positive gastritis    • Irregular menstrual cycle    • Irregular periods    • Onychomycosis    • Pain in right shoulder    • Pain in unspecified upper arm     BILATERAL   • Paronychia of finger    • Polyp of cervix    • Rhinitis    • Right flank pain    • Right lower quadrant pain    • Upper respiratory infection    • Urinary tract infectious disease         Past Surgical History:   Procedure Laterality Date   •  SECTION     • COLONOSCOPY N/A 2020    Procedure: COLONOSCOPY;  Surgeon: Trey Lara MD;  Location: [facility] ENDOSCOPY;  Service: Gastroenterology   • ENDOSCOPY  2014    EGD w/ tube 72578 (Normal esophagus. Erosive in stomach. Biopsy taken. Normal duodenum. Biopsy  taken.)   • SIGMOIDOSCOPY N/A 3/17/2020    Procedure: SIGMOIDOSCOPY FLEXIBLE with possible hemorrhoid banding March 17;  Surgeon: Trey Lara MD;  Location: [facility] ENDOSCOPY;  Service: Gastroenterology;  Laterality: N/A;  banding x 6   hemmroids       PT Ortho     Row Name 04/20/21 1300       Subjective Comments    Subjective Comments  Pts son present and translates throughout session. Pt notes back continues to feel better and just the arm is bothering her. notes about 95% subjective improvement and reports that she has good understanding of HEP and feels comfortable with d/c from PT at this time. Pt reports 95% subjective improvement at this time.   -AC       Precautions and Contraindications    Precautions/Limitations  no known precautions/limitations  -AC       Subjective Pain    Able to rate subjective pain?  yes  -AC    Pre-Treatment Pain Level  2  -AC    Subjective Pain Comment  shoulder only  -AC      User Key  (r) = Recorded By, (t) = Taken By, (c) = Cosigned By    Initials Name Provider Type    AC Rebeca Higgins, PT Physical Therapist                      PT Assessment/Plan     Row Name 04/20/21 1300          PT Assessment    Assessment Comments  treatment tolerated well today with progression of many exercises with increased resistance. Pt d//c this date with all goals met and with good understanding of HEP to avoid future flare-ups of pain. She reports no increased pain with activities this date.   -AC        PT Plan    PT Plan Comments  d/c this date.   -AC       User Key  (r) = Recorded By, (t) = Taken By, (c) = Cosigned By    Initials Name Provider Type    Rebeca Go, PT Physical Therapist              OP Exercises     Row Name 04/20/21 1340 04/20/21 1300          Subjective Comments    Subjective Comments  --  Pts son present and translates throughout session. Pt notes back continues to feel better and just the arm is bothering her. notes about 95% subjective improvement and  reports that she has good understanding of HEP and feels comfortable with d/c from PT at this time. Pt reports 95% subjective improvement at this time.   -AC        Subjective Pain    Able to rate subjective pain?  --  yes  -AC     Pre-Treatment Pain Level  --  2  -AC     Subjective Pain Comment  --  shoulder only  -AC        Total Minutes    18241 - PT Therapeutic Exercise Minutes  38  -AC  --        Exercise 1    Exercise Name 1  --  Pro II- L5  -AC     Time 1  --  10 minutes  -AC     Additional Comments  --  BLE/UE   -AC        Exercise 2    Exercise Name 2  --  doorway pec stretch   -AC     Sets 2  --  3  -AC     Time 2  --  30s  -AC        Exercise 3    Exercise Name 3  --  open books  -AC     Sets 3  --  1  -AC     Reps 3  --  10  -AC     Time 3  --  hold 10s  -AC        Exercise 4    Exercise Name 4  --  tband no money   -AC     Reps 4  --  20  -AC     Additional Comments  --  green   -AC        Exercise 5    Exercise Name 5  --  2# db clock taps   -AC     Sets 5  --  1  -AC     Reps 5  --  10  -AC        Exercise 6    Exercise Name 6  --  tall mat table push-ups  -AC     Sets 6  --  1  -AC     Reps 6  --  20  -AC        Exercise 7    Exercise Name 7  --  standing chest press  -AC     Sets 7  --  1  -AC     Reps 7  --  20  -AC     Additional Comments  --  5# wand  -AC        Exercise 8    Exercise Name 8  --  standing shoulder overhead press  -AC     Sets 8  --  1  -AC     Reps 8  --  20  -AC     Additional Comments  --  5# wand   -AC        Exercise 9    Exercise Name 9  --  standing shoulder flexion   -AC     Sets 9  --  1  -AC     Reps 9  --  20  -AC     Additional Comments  --  5# wand- full ROM.   -AC        Exercise 10    Exercise Name 10  --  scap stability circles   -AC     Sets 10  --  30 cw/ccw  -AC        Exercise 11    Exercise Name 11  --  shoulder 6-way   -AC     Reps 11  --  20 each direction   -AC     Additional Comments  --  green   -AC       User Key  (r) = Recorded By, (t) = Taken By, (c) =  Cosigned By    Initials Name Provider Type    Rebeca Go PT Physical Therapist                         PT OP Goals     Row Name 04/20/21 1300          PT Short Term Goals    STG Date to Achieve  03/24/21  -AC     STG 1  Pt is independent with HEP.   -AC     STG 1 Progress  Met  -AC     STG 2  Pt has no rounded shoulders posture throughout session.   -AC     STG 2 Progress  Met  -AC     STG 3  Pt has no trigger point tenderness to lumbar paraspinals and glutes.   -AC     STG 3 Progress  Met  -AC        Long Term Goals    LTG Date to Achieve  04/14/21  -AC     LTG 1  Pt is able to sit for 2+ hours without increased back pain.   -AC     LTG 1 Progress  Met  -AC     LTG 2  Pt is able to reach overhead without increased shoulder pain.   -AC     LTG 2 Progress  Met  -AC     LTG 3  Pt reports 80% subjective improvement or greater.   -AC     LTG 3 Progress  Met  -AC     LTG 4  PT has improved BLE strength to 4+/5.   -AC     LTG 4 Progress  Met  -AC       User Key  (r) = Recorded By, (t) = Taken By, (c) = Cosigned By    Initials Name Provider Type    Rebeca Go PT Physical Therapist                         Time Calculation:   Start Time: 1300  Stop Time: 1338  Time Calculation (min): 38 min  Therapy Charges for Today     Code Description Service Date Service Provider Modifiers Qty    75462729977 HC PT THER PROC EA 15 MIN 4/20/2021 Rebeca Higgins, PT GP 3                OP PT Discharge Summary  Date of Discharge: 04/20/21  Reason for Discharge: All goals achieved, Maximum functional potential achieved, Independent  Outcomes Achieved: Able to achieve all goals within established timeline  Discharge Destination: Home with home program  Discharge Instructions/Additional Comments: pt d/c this date with all goals met with no increase in pain reported throughout session. She has HEP in place at this time for continued strength/stretching to avoid recurrence of pain.      Rebeca Higgins  PT  4/20/2021

## 2021-04-27 ENCOUNTER — APPOINTMENT (OUTPATIENT)
Dept: PHYSICAL THERAPY | Facility: HOSPITAL | Age: 46
End: 2021-04-27

## 2022-03-22 PROCEDURE — 87635 SARS-COV-2 COVID-19 AMP PRB: CPT | Performed by: NURSE PRACTITIONER

## 2022-05-10 ENCOUNTER — OFFICE VISIT (OUTPATIENT)
Dept: FAMILY MEDICINE CLINIC | Facility: CLINIC | Age: 47
End: 2022-05-10

## 2022-05-10 VITALS
TEMPERATURE: 96.4 F | HEIGHT: 64 IN | HEART RATE: 94 BPM | BODY MASS INDEX: 23.2 KG/M2 | DIASTOLIC BLOOD PRESSURE: 76 MMHG | OXYGEN SATURATION: 96 % | SYSTOLIC BLOOD PRESSURE: 122 MMHG | WEIGHT: 135.9 LBS

## 2022-05-10 DIAGNOSIS — R05.8 COUGH PRODUCTIVE OF PURULENT SPUTUM: ICD-10-CM

## 2022-05-10 DIAGNOSIS — R05.3 CHRONIC COUGH: ICD-10-CM

## 2022-05-10 DIAGNOSIS — J06.9 UPPER RESPIRATORY TRACT INFECTION, UNSPECIFIED TYPE: ICD-10-CM

## 2022-05-10 DIAGNOSIS — J02.9 PHARYNGITIS, UNSPECIFIED ETIOLOGY: Primary | ICD-10-CM

## 2022-05-10 DIAGNOSIS — J02.9 SORE THROAT: ICD-10-CM

## 2022-05-10 PROCEDURE — 87635 SARS-COV-2 COVID-19 AMP PRB: CPT | Performed by: NURSE PRACTITIONER

## 2022-05-10 PROCEDURE — 99213 OFFICE O/P EST LOW 20 MIN: CPT | Performed by: STUDENT IN AN ORGANIZED HEALTH CARE EDUCATION/TRAINING PROGRAM

## 2022-05-10 RX ORDER — GUAIFENESIN 600 MG/1
600 TABLET, EXTENDED RELEASE ORAL 2 TIMES DAILY
Qty: 14 TABLET | Refills: 0 | Status: SHIPPED | OUTPATIENT
Start: 2022-05-10 | End: 2022-05-17

## 2022-05-10 RX ORDER — AZITHROMYCIN 250 MG/1
TABLET, FILM COATED ORAL
Qty: 6 TABLET | Refills: 0 | Status: SHIPPED | OUTPATIENT
Start: 2022-05-10 | End: 2022-05-15

## 2022-05-10 RX ORDER — FLUTICASONE PROPIONATE 50 MCG
2 SPRAY, SUSPENSION (ML) NASAL DAILY
Qty: 16 G | Refills: 0 | Status: SHIPPED | OUTPATIENT
Start: 2022-05-10 | End: 2022-08-23 | Stop reason: HOSPADM

## 2022-05-10 NOTE — PROGRESS NOTES
Family Medicine Residency  Poly David MD    Subjective:     Brittnee Boyd is a 47 y.o. female who presents c/o cough and chest pain started 1 and half months ago. Pain is sharp, 7-8 in severity, localized on left chest, sometimes feels nausea with coughing but denies vomiting. Cough is worsening for the past week. Other associated symptoms are runny nose, fever (resolved 2 days ago), sore throat, and headache. Nyquil minimally helped. Tylenol helped with fever. Denies recent travel outside the [place]. Father passed away from TB in .    Patient also c/o urine leaking with coughing. First episode was 2-3 years ago, and most recent episode was last night. denies burning sensation on urination, urgency, or frequency.   Last menstrual period was in 2017.       The following portions of the patient's history were reviewed and updated as appropriate: allergies, current medications, past family history, past medical history, past social history, past surgical history and problem list.    Past Medical Hx:  Past Medical History:   Diagnosis Date   • Abdominal pain, epigastric    • Acute pharyngitis    • Cough    • Diarrhea of presumed infectious origin    • Dysuria    • Epigastric pain    • Helicobacter positive gastritis    • Irregular menstrual cycle    • Irregular periods    • Onychomycosis    • Pain in right shoulder    • Pain in unspecified upper arm     BILATERAL   • Paronychia of finger    • Polyp of cervix    • Rhinitis    • Right flank pain    • Right lower quadrant pain    • Upper respiratory infection    • Urinary tract infectious disease        Past Surgical Hx:  Past Surgical History:   Procedure Laterality Date   •  SECTION     • COLONOSCOPY N/A 2020    Procedure: COLONOSCOPY;  Surgeon: Trey Lara MD;  Location: [facility] ENDOSCOPY;  Service: Gastroenterology   • ENDOSCOPY  2014    EGD w/ tube 36186 (Normal esophagus. Erosive in stomach. Biopsy taken. Normal duodenum. Biopsy taken.)   •  SIGMOIDOSCOPY N/A 3/17/2020    Procedure: SIGMOIDOSCOPY FLEXIBLE with possible hemorrhoid banding March 17;  Surgeon: Trey Lara MD;  Location: [facility] ENDOSCOPY;  Service: Gastroenterology;  Laterality: N/A;  banding x 6   hemmroids       Current Meds:    Current Outpatient Medications:   •  azithromycin (ZITHROMAX) 250 MG tablet, Take 2 tablets by mouth the first day, THEN 1 tablet Daily for the next 4 days., Disp: 6 tablet, Rfl: 0  •  benzonatate (TESSALON) 200 MG capsule, Take 1 capsule by mouth 3 (Three) Times a Day As Needed for Cough., Disp: 30 capsule, Rfl: 0  •  fluticasone (Flonase) 50 MCG/ACT nasal spray, Take 2 sprays into the nostril(s) as directed by provider once Daily for 14 days., Disp: 16 g, Rfl: 0  •  guaiFENesin (Mucinex) 600 MG 12 hr tablet, Take 1 tablet by mouth 2 (Two) Times a Day for 7 days., Disp: 14 tablet, Rfl: 0  •  loratadine (CLARITIN) 10 MG tablet, Take 1 tablet by mouth Daily., Disp: 30 tablet, Rfl: 0  •  meloxicam (Mobic) 7.5 MG tablet, Take 1 tablet by mouth Daily., Disp: 30 tablet, Rfl: 1  •  omeprazole (priLOSEC) 40 MG capsule, Take 1 capsule by mouth Daily., Disp: 30 capsule, Rfl: 0  •  promethazine-dextromethorphan (PROMETHAZINE-DM) 6.25-15 MG/5ML syrup, Take 5 mL by mouth At Night As Needed for Cough., Disp: 120 mL, Rfl: 0    Allergies:  No Known Allergies    Family Hx:  Family History   Problem Relation Age of Onset   • Breast cancer Neg Hx    • Ovarian cancer Neg Hx    • Uterine cancer Neg Hx    • Colon cancer Neg Hx         Social History:  Social History     Socioeconomic History   • Marital status:    Tobacco Use   • Smoking status: Never Smoker   • Smokeless tobacco: Never Used   Substance and Sexual Activity   • Alcohol use: No   • Drug use: No   • Sexual activity: Yes     Partners: Male     Birth control/protection: None       Review of Systems  Review of Systems   Constitutional: Positive for fever (resolved with tylenol). Negative for activity change,  "appetite change and chills.   HENT: Positive for sore throat. Negative for trouble swallowing and voice change.    Eyes: Negative for discharge and visual disturbance.   Respiratory: Positive for cough. Negative for shortness of breath and wheezing.    Cardiovascular: Positive for chest pain (chronic). Negative for palpitations and leg swelling.   Gastrointestinal: Positive for nausea (with coughing). Negative for abdominal distention, abdominal pain, constipation, diarrhea and vomiting.   Endocrine: Negative for cold intolerance and heat intolerance.   Genitourinary: Negative for dysuria, frequency, pelvic pain, urgency, vaginal bleeding and vaginal discharge.        Leaking urine with coughing(stress incontinence)   Musculoskeletal: Negative for arthralgias, back pain, joint swelling and myalgias.   Skin: Negative for color change.   Allergic/Immunologic: Negative for environmental allergies.   Neurological: Negative for dizziness, weakness, light-headedness, numbness and headaches.   Psychiatric/Behavioral: Negative for agitation, confusion and sleep disturbance. The patient is not nervous/anxious.        Objective:     /76   Pulse 94   Temp 96.4 °F (35.8 °C)   Ht 162.6 cm (64\")   Wt 61.6 kg (135 lb 14.4 oz)   SpO2 96%   BMI 23.33 kg/m²   Physical Exam  Vitals reviewed.   Constitutional:       Appearance: Normal appearance.   HENT:      Head: Normocephalic and atraumatic.      Right Ear: Tympanic membrane, ear canal and external ear normal. There is no impacted cerumen.      Left Ear: Tympanic membrane, ear canal and external ear normal. There is no impacted cerumen.      Nose: Nose normal. No congestion or rhinorrhea (not present at the time of examination).      Mouth/Throat:      Mouth: Mucous membranes are moist.      Pharynx: Posterior oropharyngeal erythema present. No oropharyngeal exudate.   Eyes:      Extraocular Movements: Extraocular movements intact.      Conjunctiva/sclera: Conjunctivae " normal.      Pupils: Pupils are equal, round, and reactive to light.   Cardiovascular:      Rate and Rhythm: Normal rate and regular rhythm.      Pulses: Normal pulses.      Heart sounds: Normal heart sounds.   Pulmonary:      Effort: Pulmonary effort is normal.      Breath sounds: Normal breath sounds.   Abdominal:      General: Bowel sounds are normal.      Palpations: Abdomen is soft.   Musculoskeletal:         General: Normal range of motion.      Cervical back: Normal range of motion and neck supple.   Skin:     General: Skin is warm.      Capillary Refill: Capillary refill takes less than 2 seconds.   Neurological:      General: No focal deficit present.      Mental Status: She is alert. Mental status is at baseline.   Psychiatric:         Mood and Affect: Mood normal.         Behavior: Behavior normal.          Assessment/Plan:     Diagnoses and all orders for this visit:    1. Pharyngitis, unspecified etiology (Primary)  -     azithromycin (ZITHROMAX) 250 MG tablet; Take 2 tablets by mouth the first day, THEN 1 tablet Daily for the next 4 days.  Dispense: 6 tablet; Refill: 0  -     COVID-19 and FLU A/B PCR - Swab, Nasopharynx    2. Chronic cough  -     XR Chest 2 View    3. Upper respiratory tract infection, unspecified type  -     fluticasone (Flonase) 50 MCG/ACT nasal spray; Take 2 sprays into the nostril(s) as directed by provider once Daily for 14 days.  Dispense: 16 g; Refill: 0  -     guaiFENesin (Mucinex) 600 MG 12 hr tablet; Take 1 tablet by mouth 2 (Two) Times a Day for 7 days.  Dispense: 14 tablet; Refill: 0  -     COVID-19 and FLU A/B PCR - Swab, Nasopharynx    4. Cough productive of purulent sputum  -     guaiFENesin (Mucinex) 600 MG 12 hr tablet; Take 1 tablet by mouth 2 (Two) Times a Day for 7 days.  Dispense: 14 tablet; Refill: 0  -     COVID-19 and FLU A/B PCR - Swab, Nasopharynx    5. Sore throat  -     COVID-19 and FLU A/B PCR - Swab, Nasopharynx  -     Rapid Strep A Screen - Swab, Throat;  Future      -treating for URI, abx for pharyngitis    -due to possible exposure to TB will obtain chest X-Ray   -will obtain labs on the next visit for screening, after her acute symptoms resolves.   -was informed to go to urgent care for covid/flu/strep test. She agreed    Depression screening: Up to date; last screen 5/10/2022     Follow-up:     Return in about 3 weeks (around 5/31/2022), or if symptoms worsen or fail to improve, for Recheck on uri improvement and chest x-ray result .    Preventative:  Health Maintenance   Topic Date Due   • TDAP/TD VACCINES (1 - Tdap) Never done   • COVID-19 Vaccine (3 - Booster for Pfizer series) 11/28/2021   • ANNUAL PHYSICAL  12/31/2021   • INFLUENZA VACCINE  08/01/2022   • PAP SMEAR  01/28/2023   • COLORECTAL CANCER SCREENING  01/21/2030   • HEPATITIS C SCREENING  Completed   • Pneumococcal Vaccine 0-64  Aged Out         Alcohol use:  reports no history of alcohol use.  Nicotine status  reports that she has never smoked. She has never used smokeless tobacco.     Goals    None         RISK SCORE: 3      This document has been electronically signed by Poly David MD on May 10, 2022 16:50 CDT

## 2022-05-16 NOTE — PROGRESS NOTES
I have spoken with the patient .     I have reviewed the notes, assessments, and/or procedures performed by Dr. Poly David,   I concur with   Her  documentation and assessment and plan for Brittnee Boyd.          This document has been electronically signed by Christopher Sunshine MD on May 16, 2022 09:20 CDT

## 2022-05-24 ENCOUNTER — TELEPHONE (OUTPATIENT)
Dept: FAMILY MEDICINE CLINIC | Facility: CLINIC | Age: 47
End: 2022-05-24

## 2022-05-24 NOTE — TELEPHONE ENCOUNTER
PATIENT SISTER CALLED STATING PATIENT HAD PHYSICAL ON 05/05 AND THEY HAVEN'T HEARD ANYTHING ON TEST RESULTS. CALL BACK NUMBER [phone]

## 2022-05-26 ENCOUNTER — TELEPHONE (OUTPATIENT)
Dept: FAMILY MEDICINE CLINIC | Facility: CLINIC | Age: 47
End: 2022-05-26

## 2022-06-29 ENCOUNTER — LAB (OUTPATIENT)
Dept: LAB | Facility: HOSPITAL | Age: 47
End: 2022-06-29

## 2022-06-29 ENCOUNTER — OFFICE VISIT (OUTPATIENT)
Dept: FAMILY MEDICINE CLINIC | Facility: CLINIC | Age: 47
End: 2022-06-29

## 2022-06-29 VITALS
TEMPERATURE: 97.4 F | SYSTOLIC BLOOD PRESSURE: 108 MMHG | HEIGHT: 64 IN | DIASTOLIC BLOOD PRESSURE: 70 MMHG | HEART RATE: 72 BPM | BODY MASS INDEX: 23.2 KG/M2 | OXYGEN SATURATION: 99 % | WEIGHT: 135.9 LBS

## 2022-06-29 DIAGNOSIS — Z13.9 SCREENING DUE: ICD-10-CM

## 2022-06-29 DIAGNOSIS — Z00.00 ANNUAL PHYSICAL EXAM: ICD-10-CM

## 2022-06-29 DIAGNOSIS — E55.9 VITAMIN D DEFICIENCY: ICD-10-CM

## 2022-06-29 DIAGNOSIS — Z87.42 HISTORY OF ABNORMAL UTERINE BLEEDING: ICD-10-CM

## 2022-06-29 DIAGNOSIS — K21.9 GASTROESOPHAGEAL REFLUX DISEASE, UNSPECIFIED WHETHER ESOPHAGITIS PRESENT: ICD-10-CM

## 2022-06-29 DIAGNOSIS — K21.9 GASTROESOPHAGEAL REFLUX DISEASE, UNSPECIFIED WHETHER ESOPHAGITIS PRESENT: Primary | ICD-10-CM

## 2022-06-29 PROCEDURE — 36415 COLL VENOUS BLD VENIPUNCTURE: CPT

## 2022-06-29 PROCEDURE — 99213 OFFICE O/P EST LOW 20 MIN: CPT | Performed by: STUDENT IN AN ORGANIZED HEALTH CARE EDUCATION/TRAINING PROGRAM

## 2022-06-29 PROCEDURE — 84439 ASSAY OF FREE THYROXINE: CPT

## 2022-06-29 PROCEDURE — 80050 GENERAL HEALTH PANEL: CPT

## 2022-06-29 PROCEDURE — 80061 LIPID PANEL: CPT

## 2022-06-29 PROCEDURE — 82306 VITAMIN D 25 HYDROXY: CPT

## 2022-06-29 RX ORDER — OMEPRAZOLE 40 MG/1
40 CAPSULE, DELAYED RELEASE ORAL DAILY
Qty: 30 CAPSULE | Refills: 1 | Status: SHIPPED | OUTPATIENT
Start: 2022-06-29 | End: 2022-08-23 | Stop reason: HOSPADM

## 2022-06-30 LAB
25(OH)D3 SERPL-MCNC: 20.1 NG/ML (ref 30–100)
ALBUMIN SERPL-MCNC: 4.3 G/DL (ref 3.5–5.2)
ALBUMIN/GLOB SERPL: 1.7 G/DL
ALP SERPL-CCNC: 83 U/L (ref 39–117)
ALT SERPL W P-5'-P-CCNC: 178 U/L (ref 1–33)
ANION GAP SERPL CALCULATED.3IONS-SCNC: 9 MMOL/L (ref 5–15)
AST SERPL-CCNC: 217 U/L (ref 1–32)
BASOPHILS # BLD AUTO: 0.04 10*3/MM3 (ref 0–0.2)
BASOPHILS NFR BLD AUTO: 1.1 % (ref 0–1.5)
BILIRUB SERPL-MCNC: 0.3 MG/DL (ref 0–1.2)
BUN SERPL-MCNC: 13 MG/DL (ref 6–20)
BUN/CREAT SERPL: 29.5 (ref 7–25)
CALCIUM SPEC-SCNC: 9 MG/DL (ref 8.6–10.5)
CHLORIDE SERPL-SCNC: 106 MMOL/L (ref 98–107)
CHOLEST SERPL-MCNC: 215 MG/DL (ref 0–200)
CO2 SERPL-SCNC: 29 MMOL/L (ref 22–29)
CREAT SERPL-MCNC: 0.44 MG/DL (ref 0.57–1)
DEPRECATED RDW RBC AUTO: 37.8 FL (ref 37–54)
EGFRCR SERPLBLD CKD-EPI 2021: 118.7 ML/MIN/1.73
EOSINOPHIL # BLD AUTO: 0.07 10*3/MM3 (ref 0–0.4)
EOSINOPHIL NFR BLD AUTO: 1.8 % (ref 0.3–6.2)
ERYTHROCYTE [DISTWIDTH] IN BLOOD BY AUTOMATED COUNT: 11.9 % (ref 12.3–15.4)
GLOBULIN UR ELPH-MCNC: 2.5 GM/DL
GLUCOSE SERPL-MCNC: 56 MG/DL (ref 65–99)
HCT VFR BLD AUTO: 38.2 % (ref 34–46.6)
HDLC SERPL-MCNC: 74 MG/DL (ref 40–60)
HGB BLD-MCNC: 13.2 G/DL (ref 12–15.9)
IMM GRANULOCYTES # BLD AUTO: 0 10*3/MM3 (ref 0–0.05)
IMM GRANULOCYTES NFR BLD AUTO: 0 % (ref 0–0.5)
LDLC SERPL CALC-MCNC: 133 MG/DL (ref 0–100)
LDLC/HDLC SERPL: 1.78 {RATIO}
LYMPHOCYTES # BLD AUTO: 1.85 10*3/MM3 (ref 0.7–3.1)
LYMPHOCYTES NFR BLD AUTO: 48.7 % (ref 19.6–45.3)
MCH RBC QN AUTO: 30.8 PG (ref 26.6–33)
MCHC RBC AUTO-ENTMCNC: 34.6 G/DL (ref 31.5–35.7)
MCV RBC AUTO: 89 FL (ref 79–97)
MONOCYTES # BLD AUTO: 0.24 10*3/MM3 (ref 0.1–0.9)
MONOCYTES NFR BLD AUTO: 6.3 % (ref 5–12)
NEUTROPHILS NFR BLD AUTO: 1.6 10*3/MM3 (ref 1.7–7)
NEUTROPHILS NFR BLD AUTO: 42.1 % (ref 42.7–76)
NRBC BLD AUTO-RTO: 0 /100 WBC (ref 0–0.2)
PLATELET # BLD AUTO: 273 10*3/MM3 (ref 140–450)
PMV BLD AUTO: 10.3 FL (ref 6–12)
POTASSIUM SERPL-SCNC: 4.2 MMOL/L (ref 3.5–5.2)
PROT SERPL-MCNC: 6.8 G/DL (ref 6–8.5)
RBC # BLD AUTO: 4.29 10*6/MM3 (ref 3.77–5.28)
SODIUM SERPL-SCNC: 144 MMOL/L (ref 136–145)
T4 FREE SERPL-MCNC: 1.13 NG/DL (ref 0.93–1.7)
TRIGL SERPL-MCNC: 48 MG/DL (ref 0–150)
TSH SERPL DL<=0.05 MIU/L-ACNC: 1.59 UIU/ML (ref 0.27–4.2)
VLDLC SERPL-MCNC: 8 MG/DL (ref 5–40)
WBC NRBC COR # BLD: 3.8 10*3/MM3 (ref 3.4–10.8)

## 2022-07-07 NOTE — PROGRESS NOTES
I have seen the patient.  I have reviewed the notes, assessments, and/or procedures performed by Poly David MD, I concur with her/his documentation and assessment and plan for Brittnee Boyd.               This document has been electronically signed by Edward Atkinson MD on July 7, 2022 09:51 CDT

## 2022-07-12 ENCOUNTER — OFFICE VISIT (OUTPATIENT)
Dept: FAMILY MEDICINE CLINIC | Facility: CLINIC | Age: 47
End: 2022-07-12

## 2022-07-12 VITALS
DIASTOLIC BLOOD PRESSURE: 72 MMHG | SYSTOLIC BLOOD PRESSURE: 100 MMHG | BODY MASS INDEX: 23.43 KG/M2 | TEMPERATURE: 97.2 F | HEART RATE: 73 BPM | WEIGHT: 136.5 LBS | OXYGEN SATURATION: 97 %

## 2022-07-12 DIAGNOSIS — E78.00 HYPERCHOLESTEROLEMIA: ICD-10-CM

## 2022-07-12 DIAGNOSIS — E55.9 VITAMIN D DEFICIENCY: ICD-10-CM

## 2022-07-12 DIAGNOSIS — R74.8 ELEVATED LIVER ENZYMES: Primary | ICD-10-CM

## 2022-07-12 DIAGNOSIS — Z13.9 SCREENING DUE: ICD-10-CM

## 2022-07-12 PROCEDURE — 99213 OFFICE O/P EST LOW 20 MIN: CPT | Performed by: STUDENT IN AN ORGANIZED HEALTH CARE EDUCATION/TRAINING PROGRAM

## 2022-07-12 RX ORDER — ERGOCALCIFEROL 1.25 MG/1
50000 CAPSULE ORAL WEEKLY
Qty: 5 CAPSULE | Refills: 3 | Status: SHIPPED | OUTPATIENT
Start: 2022-07-12 | End: 2022-07-17

## 2022-07-12 NOTE — PROGRESS NOTES
Family Medicine Residency  Poly David MD    Subjective:     Brittnee Boyd is a 47 y.o. female who presents for annual physical and reviewing labs that was done on previous visit.   Reviewed labs, and imaging. Reports had abnormal pap previously. Had mammogram done previously and would like to see if she needs to do another one.   Last menstrual cycle was 2021. Sometime experiences hot flashes, not bothersome at this point.   Take ginseng everyday, handful amount.           The following portions of the patient's history were reviewed and updated as appropriate: allergies, current medications, past family history, past medical history, past social history, past surgical history and problem list.    Past Medical Hx:  Past Medical History:   Diagnosis Date   • Abdominal pain, epigastric    • Acute pharyngitis    • Cough    • Diarrhea of presumed infectious origin    • Dysuria    • Epigastric pain    • Helicobacter positive gastritis    • Irregular menstrual cycle    • Irregular periods    • Onychomycosis    • Pain in right shoulder    • Pain in unspecified upper arm     BILATERAL   • Paronychia of finger    • Polyp of cervix    • Rhinitis    • Right flank pain    • Right lower quadrant pain    • Upper respiratory infection    • Urinary tract infectious disease        Past Surgical Hx:  Past Surgical History:   Procedure Laterality Date   •  SECTION     • COLONOSCOPY N/A 2020    Procedure: COLONOSCOPY;  Surgeon: Trey Lara MD;  Location: [facility] ENDOSCOPY;  Service: Gastroenterology   • ENDOSCOPY  2014    EGD w/ tube 08146 (Normal esophagus. Erosive in stomach. Biopsy taken. Normal duodenum. Biopsy taken.)   • SIGMOIDOSCOPY N/A 3/17/2020    Procedure: SIGMOIDOSCOPY FLEXIBLE with possible hemorrhoid banding ;  Surgeon: Trey Lara MD;  Location: [facility] ENDOSCOPY;  Service: Gastroenterology;  Laterality: N/A;  banding x 6   hemmroids       Current Meds:    Current Outpatient  Medications:   •  benzonatate (TESSALON) 200 MG capsule, Take 1 capsule by mouth 3 (Three) Times a Day As Needed for Cough., Disp: 30 capsule, Rfl: 0  •  loratadine (CLARITIN) 10 MG tablet, Take 1 tablet by mouth Daily., Disp: 30 tablet, Rfl: 0  •  meloxicam (Mobic) 7.5 MG tablet, Take 1 tablet by mouth Daily., Disp: 30 tablet, Rfl: 1  •  omeprazole (priLOSEC) 40 MG capsule, Take 1 capsule by mouth Daily., Disp: 30 capsule, Rfl: 1  •  promethazine-dextromethorphan (PROMETHAZINE-DM) 6.25-15 MG/5ML syrup, Take 5 mL by mouth At Night As Needed for Cough., Disp: 120 mL, Rfl: 0  •  fluticasone (Flonase) 50 MCG/ACT nasal spray, Take 2 sprays into the nostril(s) as directed by provider once Daily for 14 days., Disp: 16 g, Rfl: 0  •  vitamin D (ERGOCALCIFEROL) 1.25 MG (53151 UT) capsule capsule, Take 1 capsule by mouth 1 (One) Time Per Week for 5 days., Disp: 5 capsule, Rfl: 3    Allergies:  No Known Allergies    Family Hx:  Family History   Problem Relation Age of Onset   • Breast cancer Neg Hx    • Ovarian cancer Neg Hx    • Uterine cancer Neg Hx    • Colon cancer Neg Hx         Social History:  Social History     Socioeconomic History   • Marital status:    Tobacco Use   • Smoking status: Never Smoker   • Smokeless tobacco: Never Used   Substance and Sexual Activity   • Alcohol use: No   • Drug use: No   • Sexual activity: Yes     Partners: Male     Birth control/protection: None       Review of Systems  Review of Systems   Constitutional: Negative for activity change, appetite change, chills and fever.   HENT: Negative for sore throat, trouble swallowing and voice change.    Eyes: Negative for discharge and visual disturbance.   Respiratory: Negative for cough, shortness of breath and wheezing.    Cardiovascular: Negative for chest pain, palpitations and leg swelling.   Gastrointestinal: Negative for abdominal distention, abdominal pain, constipation, diarrhea, nausea and vomiting.   Genitourinary: Negative for  difficulty urinating, frequency, pelvic pain, vaginal bleeding and vaginal discharge.   Musculoskeletal: Negative for arthralgias, back pain, joint swelling and myalgias.   Skin: Negative for color change.   Allergic/Immunologic: Negative for environmental allergies.   Neurological: Negative for dizziness, weakness, light-headedness, numbness and headaches.   Psychiatric/Behavioral: Negative for agitation, confusion and sleep disturbance. The patient is not nervous/anxious.        Objective:     /72   Pulse 73   Temp 97.2 °F (36.2 °C)   Wt 61.9 kg (136 lb 8 oz)   SpO2 97%   BMI 23.43 kg/m²   Physical Exam  Vitals reviewed.   Constitutional:       Appearance: Normal appearance.   HENT:      Head: Normocephalic and atraumatic.      Nose: Nose normal.      Mouth/Throat:      Mouth: Mucous membranes are moist.   Eyes:      Conjunctiva/sclera: Conjunctivae normal.      Pupils: Pupils are equal, round, and reactive to light.   Cardiovascular:      Rate and Rhythm: Normal rate and regular rhythm.      Pulses: Normal pulses.      Heart sounds: Normal heart sounds.   Pulmonary:      Effort: Pulmonary effort is normal.      Breath sounds: Normal breath sounds.   Abdominal:      General: Bowel sounds are normal.      Palpations: Abdomen is soft.   Musculoskeletal:         General: Normal range of motion.      Cervical back: Normal range of motion and neck supple.   Skin:     General: Skin is warm.      Capillary Refill: Capillary refill takes less than 2 seconds.      Coloration: Skin is not jaundiced.   Neurological:      General: No focal deficit present.      Mental Status: She is alert. Mental status is at baseline.   Psychiatric:         Mood and Affect: Mood normal.         Behavior: Behavior normal.          Assessment/Plan:     Diagnoses and all orders for this visit:    1. Elevated liver enzymes (Primary)  -     US Liver    2. Vitamin D deficiency  -     vitamin D (ERGOCALCIFEROL) 1.25 MG (55664 UT) capsule  capsule; Take 1 capsule by mouth 1 (One) Time Per Week for 5 days.  Dispense: 5 capsule; Refill: 3    3. Hypercholesterolemia  -     Lipid panel; Future    4. Screening due  -     Mammo Screening Bilateral With CAD; Future      1. Patient denies alcohol intake or smoking. She has hypercholesterolemia and elevated LDL, but the ratio is not consistent with fatty liver. Will obtain ultrasound of the liver as an initial worke up to r/o structural abnormalities. Will do further work up if indicated after result obtained.   2. Prescribed weekly Vit D.   3. Informed due to minimally being elevated no need for med at this point. Also with transaminites I don't want to start statin at this point. Informed about following low fat diet and exercise at least 30 min a day most days of the week. Then we Will obtain another lipid panel in 6 months to see if improved.  4. Previous igor was done on 12/11/2019, result showed birad cat 2, annual screening was recommended.     Informed I will research if ginseng affect LFT, and inform her about risk and benefit of it.   Will make an appt for pap. Last 2 years ago. Hx of abn pap long time ago per patient.   Will see her in one month to go over US liver result.   Informed patient about x-ray result. Normal.      Depression screening: Up to date; last screen 6/29/2022     Follow-up:     Return in about 4 weeks (around 8/9/2022) for f/u ultrasound result.    Preventative:  Health Maintenance   Topic Date Due   • TDAP/TD VACCINES (1 - Tdap) Never done   • COVID-19 Vaccine (3 - Booster for Pfizer series) 07/31/2022 (Originally 11/28/2021)   • INFLUENZA VACCINE  10/01/2022   • PAP SMEAR  01/28/2023   • LIPID PANEL  06/29/2023   • ANNUAL PHYSICAL  07/13/2023   • COLORECTAL CANCER SCREENING  01/21/2030   • HEPATITIS C SCREENING  Completed   • Pneumococcal Vaccine 0-64  Aged Out     Discussed vaccine. She agrees to take TDAP but since insurance is not covering it patient refused. Informed she  can go to health department to get it for free or at a discounted rate.       Alcohol use:  reports no history of alcohol use.  Nicotine status  reports that she has never smoked. She has never used smokeless tobacco.     Goals    None         RISK SCORE: 3      This document has been electronically signed by Poly David MD on July 12, 2022 10:31 CDT

## 2022-07-19 NOTE — PROGRESS NOTES
I have seen the patient and I have reviewed the notes, assessments, and/or procedures performed by Poly David MDduring office visit. I concur with her/his documentation and assessment and plan for Brittnee Boyd.           This document has been electronically signed by Edward Atkinson MD on July 19, 2022 14:14 CDT

## 2022-08-02 ENCOUNTER — PROCEDURE VISIT (OUTPATIENT)
Dept: FAMILY MEDICINE CLINIC | Facility: CLINIC | Age: 47
End: 2022-08-02

## 2022-08-02 VITALS
SYSTOLIC BLOOD PRESSURE: 120 MMHG | TEMPERATURE: 96.8 F | OXYGEN SATURATION: 98 % | WEIGHT: 136 LBS | HEART RATE: 74 BPM | DIASTOLIC BLOOD PRESSURE: 76 MMHG | BODY MASS INDEX: 23.22 KG/M2 | HEIGHT: 64 IN

## 2022-08-02 DIAGNOSIS — Z12.4 PAPANICOLAOU SMEAR: Primary | ICD-10-CM

## 2022-08-02 PROCEDURE — 99213 OFFICE O/P EST LOW 20 MIN: CPT | Performed by: STUDENT IN AN ORGANIZED HEALTH CARE EDUCATION/TRAINING PROGRAM

## 2022-08-04 NOTE — PROGRESS NOTES
Family Medicine Residency  Poly David MD      CC: ANNUAL WELL WOMAN EXAM    HPI  Brittnee Boyd is a 47 y.o.  menopausal female who presents for her well woman exam, and papsmear.  The patient has no complaints.  Denies any vaginal itching, burning, irritation, or discharge.  Denies any abnormal uterine bleeding.  Continues to be sexually active. Reports pain and dryness occasionally with intercourse, but denies any abnormal bleeding. Has one partner, her . Denies any urinary symptoms including dysuria, frequency, urgency, nocturia.    HEALTH MAINTENANCE  Mammogram: has upcoming appointment, will go over the result on next office visit.    Last Completed Mammogram     This patient has no relevant Health Maintenance data.        Colonoscopy: up to date   Last Completed Colonoscopy          COLORECTAL CANCER SCREENING (COLONOSCOPY - Every 10 Years) Next due on 2020  COLONOSCOPY    2020  Surgical Procedure: COLONOSCOPY    2014  Occult blood x 1, stool                Pap smear: done today, with HPV co-testing. Will go over the result on next office visit.   Last Completed Pap Smear          Ordered - PAP SMEAR (Every 3 Years) Ordered on 8/3/2022    2020  Liquid-based Pap Smear, Screening    2016  HPV DNA Probe, Direct    2016  Converted (Historical) Gyn Cytology    2016  HM Pap smear component of HM PAP SMEAR    2013  Converted (Historical) Gyn Cytology                  OB HISTORY  OB History    Para Term  AB Living   2 2 2     2   SAB IAB Ectopic Molar Multiple Live Births                    # Outcome Date GA Lbr Benjamin/2nd Weight Sex Delivery Anes PTL Lv   2 Term      CS-LTranv      1 Term      Vag-Spont        PAST MEDICAL HISTORY  Past Medical History:   Diagnosis Date   • Abdominal pain, epigastric    • Acute pharyngitis    • Cough    • Diarrhea of presumed infectious origin    • Dysuria    • Epigastric pain    • Helicobacter  positive gastritis    • Irregular menstrual cycle    • Irregular periods    • Onychomycosis    • Pain in right shoulder    • Pain in unspecified upper arm     BILATERAL   • Paronychia of finger    • Polyp of cervix    • Rhinitis    • Right flank pain    • Right lower quadrant pain    • Upper respiratory infection    • Urinary tract infectious disease      PAST SURGICAL HISTORY  Past Surgical History:   Procedure Laterality Date   •  SECTION     • COLONOSCOPY N/A 2020    Procedure: COLONOSCOPY;  Surgeon: Trey Lara MD;  Location: [facility] ENDOSCOPY;  Service: Gastroenterology   • ENDOSCOPY  2014    EGD w/ tube 66275 (Normal esophagus. Erosive in stomach. Biopsy taken. Normal duodenum. Biopsy taken.)   • SIGMOIDOSCOPY N/A 3/17/2020    Procedure: SIGMOIDOSCOPY FLEXIBLE with possible hemorrhoid banding ;  Surgeon: Trey Lara MD;  Location: [facility] ENDOSCOPY;  Service: Gastroenterology;  Laterality: N/A;  banding x 6   hemmroids     FAMILY HISTORY  Family History   Problem Relation Age of Onset   • Breast cancer Neg Hx    • Ovarian cancer Neg Hx    • Uterine cancer Neg Hx    • Colon cancer Neg Hx      SOCIAL HISTORY  Social History     Socioeconomic History   • Marital status:    Tobacco Use   • Smoking status: Never Smoker   • Smokeless tobacco: Never Used   Substance and Sexual Activity   • Alcohol use: No   • Drug use: No   • Sexual activity: Yes     Partners: Male     Birth control/protection: None     HOME MEDICATIONS  Prior to Admission medications    Medication Sig Start Date End Date Taking? Authorizing Provider   benzonatate (TESSALON) 200 MG capsule Take 1 capsule by mouth 3 (Three) Times a Day As Needed for Cough. 3/22/22   Germania Holley APRN   fluticasone (Flonase) 50 MCG/ACT nasal spray Take 2 sprays into the nostril(s) as directed by provider once Daily for 14 days. 5/10/22 6/9/22  Christopher Sunshine MD   loratadine (CLARITIN) 10 MG tablet Take 1  "tablet by mouth Daily. 3/22/22   Germania Holley APRN   meloxicam (Mobic) 7.5 MG tablet Take 1 tablet by mouth Daily. 4/13/21   Avi Santana MD   omeprazole (priLOSEC) 40 MG capsule Take 1 capsule by mouth Daily. 6/29/22   Edward Atkinson MD   promethazine-dextromethorphan (PROMETHAZINE-DM) 6.25-15 MG/5ML syrup Take 5 mL by mouth At Night As Needed for Cough. 3/22/22   Germania Holley APRN     ALLERGIES  No Known Allergies    ROS  Review of Systems   Constitutional: Negative.    HENT: Negative.    Eyes: Negative.    Respiratory: Negative.    Cardiovascular: Negative.    Gastrointestinal: Negative.    Endocrine: Negative.    Genitourinary: Negative.    Psychiatric/Behavioral: Negative.          PE  /76   Pulse 74   Temp 96.8 °F (36 °C)   Ht 162.6 cm (64\")   Wt 61.7 kg (136 lb)   SpO2 98%   BMI 23.34 kg/m²        Physical Exam  Vitals reviewed. Exam conducted with a chaperone present.   Constitutional:       Appearance: Normal appearance.   HENT:      Head: Normocephalic and atraumatic.      Nose: Nose normal.      Mouth/Throat:      Mouth: Mucous membranes are moist.   Eyes:      Conjunctiva/sclera: Conjunctivae normal.      Pupils: Pupils are equal, round, and reactive to light.   Cardiovascular:      Rate and Rhythm: Normal rate and regular rhythm.      Pulses: Normal pulses.      Heart sounds: Normal heart sounds.   Pulmonary:      Effort: Pulmonary effort is normal.      Breath sounds: Normal breath sounds.   Abdominal:      General: Bowel sounds are normal.      Palpations: Abdomen is soft.      Hernia: There is no hernia in the left inguinal area or right inguinal area.   Genitourinary:     General: Normal vulva.      Exam position: Supine.      Pubic Area: No rash.       Labia:         Right: No rash, tenderness, lesion or injury.         Left: No rash, tenderness, lesion or injury.       Urethra: No prolapse, urethral pain, urethral swelling or urethral lesion.   Musculoskeletal: "         General: Normal range of motion.      Cervical back: Normal range of motion and neck supple.   Lymphadenopathy:      Lower Body: No right inguinal adenopathy. No left inguinal adenopathy.   Skin:     General: Skin is warm.      Capillary Refill: Capillary refill takes less than 2 seconds.   Neurological:      General: No focal deficit present.      Mental Status: She is alert. Mental status is at baseline.   Psychiatric:         Mood and Affect: Mood normal.         Behavior: Behavior normal.           IMPRESSION  Brittnee Boyd is a 47 y.o.  presenting for annual well woman exam.    PLAN    1. Papanicolaou smear    - Liquid-based Pap Smear, Screening  - LIQUID-BASED PAP SMEAR, [facility] ([facility]); Future  - HPV GENOTYPING, [facility] - , Cervix; Future  - LIQUID-BASED PAP SMEAR, [facility] ([facility])  - HPV GENOTYPING, [facility] - , Cervix    1. will go over the result on next office visit. Patient was advised to use lubricant during the intercourse, there wasn't any sign of atrophic vaginitis on PE. Will evaluate if lubricant was effective.  -patient has upcoming liver ultrasound and mammogram appt, will go over the result on her next office visit.       Alcohol use:  reports no history of alcohol use.  Nicotine status  reports that she has never smoked. She has never used smokeless tobacco.     Goals    None         RISK SCORE: 3     This document has been electronically signed by Poly David MD on 2022 09:47 CDT      Poly David MD  22  09:47 CDT

## 2022-08-08 LAB — REF LAB TEST METHOD: NORMAL

## 2022-08-08 NOTE — PROGRESS NOTES
I have seen the patient.  I have reviewed the notes, assessments, and/or procedures performed by Dr. David, I concur with her/his documentation and assessment and plan for Brittnee Boyd.       This document has been electronically signed by Sg Andrea MD on August 8, 2022 16:15 CDT

## 2022-08-23 ENCOUNTER — OFFICE VISIT (OUTPATIENT)
Dept: OBSTETRICS AND GYNECOLOGY | Facility: CLINIC | Age: 47
End: 2022-08-23

## 2022-08-23 VITALS
DIASTOLIC BLOOD PRESSURE: 64 MMHG | SYSTOLIC BLOOD PRESSURE: 102 MMHG | WEIGHT: 137 LBS | BODY MASS INDEX: 23.39 KG/M2 | HEIGHT: 64 IN

## 2022-08-23 DIAGNOSIS — Z78.0 POSTMENOPAUSAL: Primary | ICD-10-CM

## 2022-08-23 PROCEDURE — 99213 OFFICE O/P EST LOW 20 MIN: CPT | Performed by: NURSE PRACTITIONER

## 2022-08-23 RX ORDER — ERGOCALCIFEROL 1.25 MG/1
50000 CAPSULE ORAL
COMMUNITY
Start: 2022-08-09 | End: 2022-08-25

## 2022-08-23 NOTE — PROGRESS NOTES
Subjective   Brittnee Boyd is a 47 y.o. for history of abnormal uterine bleeding, mammo    Brittnee Boyd is a 47 yr old  postmenopausal female who is referred by her PCP for history of abnormal uterine bleeding.  Pt speaks Mandarin and is accompanied by her sister-in-law. They declined use of  with today's visit. They are unsure why they have an appt today but knows she needs a mamogram. LMP-last July, so patient meets the criteria for postmenopausal. Is scheduled for a mammogram today; does reports some tenderness over her sternum. Denies any breast lump or nipple discharge. She works as a . Last Pap 2022 NIL, negative HPV.      The following portions of the patient's history were reviewed and updated as appropriate: allergies, current medications, past family history, past medical history, past social history, past surgical history and problem list.    Review of Systems   Constitutional: Negative for chills, fatigue and fever.   Cardiovascular: Negative for chest pain and palpitations.   Gastrointestinal: Negative for abdominal pain, constipation, diarrhea and nausea.   Genitourinary: Negative for breast discharge, breast lump, breast pain, dysuria, frequency, pelvic pressure and vaginal pain.   Skin: Negative for rash.       Objective   Physical Exam  Vitals and nursing note reviewed.   Constitutional:       Appearance: Normal appearance.   Pulmonary:      Effort: Pulmonary effort is normal.      Breath sounds: Normal breath sounds.   Chest:   Breasts:      Right: Normal.      Left: Normal.         Genitourinary:     Comments: Declined exam.   Neurological:      Mental Status: She is alert.   Psychiatric:         Mood and Affect: Mood normal.         Behavior: Behavior normal.           Assessment & Plan   Diagnoses and all orders for this visit:    1. Postmenopausal (Primary)      Reviewed history of AUB and now, patient is considered postmenopausal as she has not had a period in over a  year. Recommend that if she has a period again, to return for f/u as it is not  Normal. Discussed breast awareness, annual mammogram, and pap cotesting every 5 years till age 63. May take tylenol or ibuprofen for chest tenderness; return if it does not improve or resolve. Return as needed or in one year.

## 2022-08-24 ENCOUNTER — TELEPHONE (OUTPATIENT)
Dept: FAMILY MEDICINE CLINIC | Facility: CLINIC | Age: 47
End: 2022-08-24

## 2022-08-24 LAB — REF LAB TEST METHOD: NORMAL

## 2022-08-24 NOTE — TELEPHONE ENCOUNTER
JENNIFER, SISTER IN LAW CALLED SAID PATIENT IS HAVING PROBLEMS, FACE SWOLLEN, SHE HAD TESTS DONE AND HAS LIVER PROBLEMS. I ADVISED GO TO ER. PATIENT DOES NOT SPEAK ENGLISH. SISTER IN LAW REQUESTED DR ELIZONDO CALL [phone]

## 2022-09-07 ENCOUNTER — OFFICE VISIT (OUTPATIENT)
Dept: FAMILY MEDICINE CLINIC | Facility: CLINIC | Age: 47
End: 2022-09-07

## 2022-09-07 VITALS
SYSTOLIC BLOOD PRESSURE: 122 MMHG | OXYGEN SATURATION: 96 % | TEMPERATURE: 97.7 F | HEIGHT: 64 IN | HEART RATE: 83 BPM | DIASTOLIC BLOOD PRESSURE: 86 MMHG | WEIGHT: 138 LBS | BODY MASS INDEX: 23.56 KG/M2

## 2022-09-07 DIAGNOSIS — F32.1 CURRENT MODERATE EPISODE OF MAJOR DEPRESSIVE DISORDER, UNSPECIFIED WHETHER RECURRENT: ICD-10-CM

## 2022-09-07 DIAGNOSIS — E55.9 VITAMIN D DEFICIENCY: ICD-10-CM

## 2022-09-07 DIAGNOSIS — N28.9 KIDNEY LESION: ICD-10-CM

## 2022-09-07 DIAGNOSIS — G47.00 INSOMNIA, UNSPECIFIED TYPE: Primary | ICD-10-CM

## 2022-09-07 PROCEDURE — 99213 OFFICE O/P EST LOW 20 MIN: CPT | Performed by: STUDENT IN AN ORGANIZED HEALTH CARE EDUCATION/TRAINING PROGRAM

## 2022-09-07 RX ORDER — ERGOCALCIFEROL 1.25 MG/1
50000 CAPSULE ORAL WEEKLY
Qty: 5 CAPSULE | Refills: 3 | Status: SHIPPED | OUTPATIENT
Start: 2022-09-07 | End: 2022-10-18 | Stop reason: SDUPTHER

## 2022-09-07 RX ORDER — PAROXETINE HYDROCHLORIDE 20 MG/1
20 TABLET, FILM COATED ORAL EVERY MORNING
Qty: 30 TABLET | Refills: 2 | Status: SHIPPED | OUTPATIENT
Start: 2022-09-07 | End: 2022-10-18 | Stop reason: SDUPTHER

## 2022-09-07 RX ORDER — RAMELTEON 8 MG/1
8 TABLET ORAL NIGHTLY
Qty: 30 TABLET | Refills: 0 | Status: SHIPPED | OUTPATIENT
Start: 2022-09-07 | End: 2022-10-07

## 2022-09-11 NOTE — PROGRESS NOTES
Family Medicine Residency  Poly David MD    Subjective:     Brittnee Boyd is a 47 y.o. female who presents c/o insomnia and imaging result follow up. US liver result given. Patient reports she falls sleep but 2 hours later she wakes up, she wakes up 2-3 times per night, and hard to go back to sleep. Symptoms started 20 days ago, melatonin not helping. Admits to crying, dizziness, headache, nervousness, decreased concentration, decreased energy. Patient denies feeling guilty, loss of appetite, interest in doing hobbies, HI, or SI.     The following portions of the patient's history were reviewed and updated as appropriate: allergies, current medications, past family history, past medical history, past social history, past surgical history and problem list.    Past Medical Hx:  Past Medical History:   Diagnosis Date   • Abdominal pain, epigastric    • Acute pharyngitis    • Cough    • Diarrhea of presumed infectious origin    • Dysuria    • Epigastric pain    • Helicobacter positive gastritis    • Irregular menstrual cycle    • Irregular periods    • Onychomycosis    • Pain in right shoulder    • Pain in unspecified upper arm     BILATERAL   • Paronychia of finger    • Polyp of cervix    • Rhinitis    • Right flank pain    • Right lower quadrant pain    • Upper respiratory infection    • Urinary tract infectious disease        Past Surgical Hx:  Past Surgical History:   Procedure Laterality Date   •  SECTION     • COLONOSCOPY N/A 2020    Procedure: COLONOSCOPY;  Surgeon: Trey Lara MD;  Location: [facility] ENDOSCOPY;  Service: Gastroenterology   • ENDOSCOPY  2014    EGD w/ tube 35309 (Normal esophagus. Erosive in stomach. Biopsy taken. Normal duodenum. Biopsy taken.)   • SIGMOIDOSCOPY N/A 3/17/2020    Procedure: SIGMOIDOSCOPY FLEXIBLE with possible hemorrhoid banding ;  Surgeon: Trey Lara MD;  Location: [facility] ENDOSCOPY;  Service: Gastroenterology;  Laterality: N/A;  banding  "x 6   hemmroids       Current Meds:    Current Outpatient Medications:   •  hydrOXYzine pamoate (VISTARIL) 50 MG capsule, Take 1 capsule by mouth 3 (Three) Times a Day As Needed (insomnia) for up to 30 days., Disp: 30 capsule, Rfl: 0  •  PARoxetine (Paxil) 20 MG tablet, Take 1 tablet by mouth Every Morning for 90 days., Disp: 30 tablet, Rfl: 2  •  ramelteon (ROZEREM) 8 MG tablet, Take 1 tablet by mouth Every Night for 30 days., Disp: 30 tablet, Rfl: 0  •  vitamin D (ERGOCALCIFEROL) 1.25 MG (60226 UT) capsule capsule, Take 1 capsule by mouth 1 (One) Time Per Week for 20 doses., Disp: 5 capsule, Rfl: 3    Allergies:  No Known Allergies    Family Hx:  Family History   Problem Relation Age of Onset   • Breast cancer Neg Hx    • Ovarian cancer Neg Hx    • Uterine cancer Neg Hx    • Colon cancer Neg Hx         Social History:  Social History     Socioeconomic History   • Marital status:    Tobacco Use   • Smoking status: Never Smoker   • Smokeless tobacco: Never Used   Substance and Sexual Activity   • Alcohol use: No   • Drug use: No   • Sexual activity: Yes     Partners: Male     Birth control/protection: None       Review of Systems  Review of Systems   Constitutional: Negative.    Eyes: Negative.    Respiratory: Negative.    Cardiovascular: Negative.    Gastrointestinal: Negative.    Endocrine: Negative.    Genitourinary: Negative.    Musculoskeletal: Negative.    Neurological: Positive for dizziness and headaches. Negative for tremors, syncope, speech difficulty, weakness, light-headedness and numbness.   Psychiatric/Behavioral: Positive for decreased concentration, dysphoric mood and sleep disturbance. Negative for agitation, behavioral problems, hallucinations, self-injury and suicidal ideas. The patient is nervous/anxious. The patient is not hyperactive.        Objective:     /86   Pulse 83   Temp 97.7 °F (36.5 °C)   Ht 162.6 cm (64\")   Wt 62.6 kg (138 lb)   LMP 02/04/2021 (Approximate)   SpO2 " 96%   BMI 23.69 kg/m²   Physical Exam  Vitals reviewed.   Constitutional:       Appearance: Normal appearance.   HENT:      Head: Normocephalic and atraumatic.      Nose: Nose normal.      Mouth/Throat:      Mouth: Mucous membranes are moist.   Eyes:      Conjunctiva/sclera: Conjunctivae normal.      Pupils: Pupils are equal, round, and reactive to light.   Cardiovascular:      Rate and Rhythm: Normal rate and regular rhythm.      Pulses: Normal pulses.      Heart sounds: Normal heart sounds.   Pulmonary:      Effort: Pulmonary effort is normal.      Breath sounds: Normal breath sounds.   Abdominal:      General: Bowel sounds are normal.      Palpations: Abdomen is soft.   Musculoskeletal:         General: Normal range of motion.      Cervical back: Normal range of motion and neck supple.   Neurological:      General: No focal deficit present.      Mental Status: She is alert. Mental status is at baseline.   Psychiatric:         Mood and Affect: Affect normal. Mood is depressed.         Behavior: Behavior is not slowed or withdrawn. Behavior is cooperative.         Thought Content: Thought content is not paranoid or delusional. Thought content does not include homicidal or suicidal ideation. Thought content does not include homicidal or suicidal plan.          Assessment/Plan:     Diagnoses and all orders for this visit:    1. Insomnia, unspecified type (Primary)  -     PARoxetine (Paxil) 20 MG tablet; Take 1 tablet by mouth Every Morning for 90 days.  Dispense: 30 tablet; Refill: 2  -     ramelteon (ROZEREM) 8 MG tablet; Take 1 tablet by mouth Every Night for 30 days.  Dispense: 30 tablet; Refill: 0    2. Current moderate episode of major depressive disorder, unspecified whether recurrent (HCC)  -     PARoxetine (Paxil) 20 MG tablet; Take 1 tablet by mouth Every Morning for 90 days.  Dispense: 30 tablet; Refill: 2    3. Kidney lesion  -     CT Abdomen Pelvis Without Contrast    4. Vitamin D deficiency  -      vitamin D (ERGOCALCIFEROL) 1.25 MG (83751 UT) capsule capsule; Take 1 capsule by mouth 1 (One) Time Per Week for 20 doses.  Dispense: 5 capsule; Refill: 3    1. Will try ramelteon till paroxetine starts working.   2. Due to patient having problem with sleep will choose paroxetine in SSRI family due to sedation effect. Will evaluate next time, for dose adjustment or considering different SSRI.   3. On liver US incidental finding of kidney lesion observed, will obtain better imaging modality to visualized the defect. Will evaluate and see her in the office after imaging result obtained to go over the result.   4. Labs obtained last time reviewed with the patient. Vit D prescribed due to deficiency. Will evaluate the response in the future months.     -will obtain PHQ-9 next visit.       Depression screening: Up to date; last screen 6/29/2022     Follow-up:     Return in about 6 weeks (around 10/19/2022).    Preventative:  Health Maintenance   Topic Date Due   • TDAP/TD VACCINES (1 - Tdap) Never done   • COVID-19 Vaccine (3 - Booster for Pfizer series) 11/28/2021   • INFLUENZA VACCINE  10/01/2022   • LIPID PANEL  06/29/2023   • ANNUAL PHYSICAL  07/13/2023   • PAP SMEAR  08/02/2025   • COLORECTAL CANCER SCREENING  01/21/2030   • HEPATITIS C SCREENING  Completed   • Pneumococcal Vaccine 0-64  Aged Out     papsmear normal. Result discussed w patient.     Alcohol use:  reports no history of alcohol use.  Nicotine status  reports that she has never smoked. She has never used smokeless tobacco.     Goals    None         RISK SCORE: 3      This document has been electronically signed by Poly David MD on September 11, 2022 12:42 CDT

## 2022-09-14 NOTE — PROGRESS NOTES
I have seen the patient.  I have reviewed the notes, assessments, and/or procedures performed by Poly David MD during office visit I concur with her/his documentation and assessment and plan for Brittnee Boyd.            This document has been electronically signed by Antnoy Drew MD on September 14, 2022 09:27 CDT

## 2022-09-20 ENCOUNTER — HOSPITAL ENCOUNTER (OUTPATIENT)
Dept: CT IMAGING | Facility: HOSPITAL | Age: 47
Discharge: HOME | End: 2022-09-20
Admitting: RADIOLOGY

## 2022-09-20 PROCEDURE — 74176 CT ABD & PELVIS W/O CONTRAST: CPT

## 2022-10-18 ENCOUNTER — LAB (OUTPATIENT)
Dept: LAB | Facility: HOSPITAL | Age: 47
End: 2022-10-18

## 2022-10-18 ENCOUNTER — OFFICE VISIT (OUTPATIENT)
Dept: FAMILY MEDICINE CLINIC | Facility: CLINIC | Age: 47
End: 2022-10-18

## 2022-10-18 VITALS
DIASTOLIC BLOOD PRESSURE: 74 MMHG | BODY MASS INDEX: 24.01 KG/M2 | TEMPERATURE: 96.4 F | OXYGEN SATURATION: 97 % | WEIGHT: 140.6 LBS | SYSTOLIC BLOOD PRESSURE: 122 MMHG | HEIGHT: 64 IN | HEART RATE: 88 BPM

## 2022-10-18 DIAGNOSIS — E55.9 VITAMIN D DEFICIENCY: ICD-10-CM

## 2022-10-18 DIAGNOSIS — G47.00 INSOMNIA, UNSPECIFIED TYPE: ICD-10-CM

## 2022-10-18 DIAGNOSIS — R30.0 DYSURIA: Primary | ICD-10-CM

## 2022-10-18 DIAGNOSIS — F32.1 CURRENT MODERATE EPISODE OF MAJOR DEPRESSIVE DISORDER, UNSPECIFIED WHETHER RECURRENT: ICD-10-CM

## 2022-10-18 PROCEDURE — 81001 URINALYSIS AUTO W/SCOPE: CPT | Performed by: STUDENT IN AN ORGANIZED HEALTH CARE EDUCATION/TRAINING PROGRAM

## 2022-10-18 PROCEDURE — 99213 OFFICE O/P EST LOW 20 MIN: CPT | Performed by: STUDENT IN AN ORGANIZED HEALTH CARE EDUCATION/TRAINING PROGRAM

## 2022-10-18 PROCEDURE — 87186 SC STD MICRODIL/AGAR DIL: CPT | Performed by: STUDENT IN AN ORGANIZED HEALTH CARE EDUCATION/TRAINING PROGRAM

## 2022-10-18 PROCEDURE — 87086 URINE CULTURE/COLONY COUNT: CPT | Performed by: STUDENT IN AN ORGANIZED HEALTH CARE EDUCATION/TRAINING PROGRAM

## 2022-10-18 PROCEDURE — 87077 CULTURE AEROBIC IDENTIFY: CPT | Performed by: STUDENT IN AN ORGANIZED HEALTH CARE EDUCATION/TRAINING PROGRAM

## 2022-10-18 RX ORDER — PAROXETINE HYDROCHLORIDE 20 MG/1
20 TABLET, FILM COATED ORAL EVERY MORNING
Qty: 30 TABLET | Refills: 2 | Status: SHIPPED | OUTPATIENT
Start: 2022-10-18 | End: 2023-01-16

## 2022-10-18 RX ORDER — ERGOCALCIFEROL 1.25 MG/1
50000 CAPSULE ORAL WEEKLY
Qty: 5 CAPSULE | Refills: 3 | Status: SHIPPED | OUTPATIENT
Start: 2022-10-18 | End: 2023-03-01

## 2022-10-18 NOTE — PROGRESS NOTES
I have seen the patient.  I have reviewed the notes, assessments, and/or procedures performed by Poly David MD  during office visit I concur with her/his documentation and assessment and plan for Brittnee Boyd.            This document has been electronically signed by Cheo Santana MD on October 18, 2022 14:36 CDT

## 2022-10-18 NOTE — PROGRESS NOTES
Family Medicine Residency  Poly David MD    Subjective:     Brittnee Boyd is a 47 y.o. female who presents for MDD f/u. She was started on paxil. Her energy level, sleep, headache, nervousness, concentration, and crying has resolved with med.   Reviewed ct abd and pelvis result. Explained and addressed all her concern about imaging.   Endorses urgency, and frequency. Reports she feels a urge to urinate but produces small amount of urine. Admits to minimal burning sensation on urination. Denies pelvic pain. Denies previous history of UTI.     The following portions of the patient's history were reviewed and updated as appropriate: allergies, current medications, past family history, past medical history, past social history, past surgical history and problem list.    Past Medical Hx:  Past Medical History:   Diagnosis Date   • Abdominal pain, epigastric    • Acute pharyngitis    • Cough    • Diarrhea of presumed infectious origin    • Dysuria    • Epigastric pain    • Helicobacter positive gastritis    • Irregular menstrual cycle    • Irregular periods    • Onychomycosis    • Pain in right shoulder    • Pain in unspecified upper arm     BILATERAL   • Paronychia of finger    • Polyp of cervix    • Rhinitis    • Right flank pain    • Right lower quadrant pain    • Upper respiratory infection    • Urinary tract infectious disease        Past Surgical Hx:  Past Surgical History:   Procedure Laterality Date   •  SECTION     • COLONOSCOPY N/A 2020    Procedure: COLONOSCOPY;  Surgeon: Trey Lara MD;  Location: [facility] ENDOSCOPY;  Service: Gastroenterology   • ENDOSCOPY  2014    EGD w/ tube 45749 (Normal esophagus. Erosive in stomach. Biopsy taken. Normal duodenum. Biopsy taken.)   • SIGMOIDOSCOPY N/A 3/17/2020    Procedure: SIGMOIDOSCOPY FLEXIBLE with possible hemorrhoid banding ;  Surgeon: Trey Lara MD;  Location: [facility] ENDOSCOPY;  Service: Gastroenterology;  Laterality: N/A;   "banding x 6   hemmroids       Current Meds:    Current Outpatient Medications:   •  PARoxetine (Paxil) 20 MG tablet, Take 1 tablet by mouth Every Morning for 90 days., Disp: 30 tablet, Rfl: 2  •  vitamin D (ERGOCALCIFEROL) 1.25 MG (24148 UT) capsule capsule, Take 1 capsule by mouth 1 (One) Time Per Week for 20 doses., Disp: 5 capsule, Rfl: 3    Allergies:  No Known Allergies    Family Hx:  Family History   Problem Relation Age of Onset   • Breast cancer Neg Hx    • Ovarian cancer Neg Hx    • Uterine cancer Neg Hx    • Colon cancer Neg Hx         Social History:  Social History     Socioeconomic History   • Marital status:    Tobacco Use   • Smoking status: Never   • Smokeless tobacco: Never   Substance and Sexual Activity   • Alcohol use: No   • Drug use: No   • Sexual activity: Yes     Partners: Male     Birth control/protection: None       Review of Systems  Review of Systems   Constitutional: Negative for activity change, appetite change, chills and fever.   HENT: Negative for sore throat, trouble swallowing and voice change.    Respiratory: Negative for cough, shortness of breath and wheezing.    Cardiovascular: Negative for chest pain, palpitations and leg swelling.   Gastrointestinal: Negative for abdominal distention, abdominal pain, constipation, diarrhea, nausea and vomiting.   Genitourinary: Negative for difficulty urinating, frequency, pelvic pain, vaginal bleeding and vaginal discharge.   Musculoskeletal: Negative for arthralgias, back pain, joint swelling and myalgias.   Neurological: Negative for dizziness, weakness, light-headedness, numbness and headaches.   Psychiatric/Behavioral: Negative for agitation, confusion and sleep disturbance. The patient is not nervous/anxious.        Objective:     /74   Pulse 88   Temp 96.4 °F (35.8 °C)   Ht 162.6 cm (64\")   Wt 63.8 kg (140 lb 9.6 oz)   LMP 02/04/2021 (Approximate)   SpO2 97%   BMI 24.13 kg/m²   Physical Exam  Vitals reviewed. "   Constitutional:       Appearance: Normal appearance.   HENT:      Head: Normocephalic.   Eyes:      Conjunctiva/sclera: Conjunctivae normal.   Cardiovascular:      Rate and Rhythm: Normal rate and regular rhythm.      Pulses: Normal pulses.      Heart sounds: Normal heart sounds.   Pulmonary:      Effort: Pulmonary effort is normal.      Breath sounds: Normal breath sounds.   Abdominal:      General: Bowel sounds are normal.      Palpations: Abdomen is soft.   Musculoskeletal:         General: Normal range of motion.      Cervical back: Normal range of motion and neck supple.   Neurological:      General: No focal deficit present.      Mental Status: She is alert. Mental status is at baseline.   Psychiatric:         Mood and Affect: Mood normal.         Behavior: Behavior normal.          Assessment/Plan:     Diagnoses and all orders for this visit:    1. Dysuria (Primary)  -     Urinalysis With Culture If Indicated -    2. Vitamin D deficiency  -     vitamin D (ERGOCALCIFEROL) 1.25 MG (08580 UT) capsule capsule; Take 1 capsule by mouth 1 (One) Time Per Week for 20 doses.  Dispense: 5 capsule; Refill: 3    3. Current moderate episode of major depressive disorder, unspecified whether recurrent (HCC)  -     PARoxetine (Paxil) 20 MG tablet; Take 1 tablet by mouth Every Morning for 90 days.  Dispense: 30 tablet; Refill: 2    4. Insomnia, unspecified type  -     PARoxetine (Paxil) 20 MG tablet; Take 1 tablet by mouth Every Morning for 90 days.  Dispense: 30 tablet; Refill: 2    1. Will obtain UA to r/o UTI. For symptoms of urgency and frequency, could be due to weakness in pelvic floor muscle as well. Wrote down the name of kegel exercises on the card for her, discussed its benefit. Informed her to let me know if she experiences improvement, if not will investigate causes further.   2,3,4. Associated symptoms improved with initiation of med, continue same regimen.     -discussed the result of CT scan. Informed her if  started developing changes in symptoms, or worsening of symptoms come back to the clinic to follow up. Informed to keep hydrated.       Depression screening: Up to date; last screen 6/29/2022     Follow-up:     Return in about 3 months (around 1/18/2023) for Recheck.    Preventative:  Health Maintenance   Topic Date Due   • TDAP/TD VACCINES (1 - Tdap) Never done   • COVID-19 Vaccine (3 - Booster for Pfizer series) 08/23/2021   • INFLUENZA VACCINE  08/01/2022   • LIPID PANEL  06/29/2023   • ANNUAL PHYSICAL  07/13/2023   • PAP SMEAR  08/02/2025   • COLORECTAL CANCER SCREENING  01/21/2030   • HEPATITIS C SCREENING  Completed   • Pneumococcal Vaccine 0-64  Aged Out         Alcohol use:  reports no history of alcohol use.  Nicotine status  reports that she has never smoked. She has never used smokeless tobacco.     Goals    None         RISK SCORE: 3      This document has been electronically signed by Poly David MD on October 18, 2022 14:26 CDT

## 2022-10-19 LAB
BACTERIA UR QL AUTO: ABNORMAL /HPF
BILIRUB UR QL STRIP: NEGATIVE
CLARITY UR: CLEAR
COLOR UR: YELLOW
GLUCOSE UR STRIP-MCNC: NEGATIVE MG/DL
HGB UR QL STRIP.AUTO: NEGATIVE
HYALINE CASTS UR QL AUTO: ABNORMAL /LPF
KETONES UR QL STRIP: NEGATIVE
LEUKOCYTE ESTERASE UR QL STRIP.AUTO: ABNORMAL
NITRITE UR QL STRIP: POSITIVE
PH UR STRIP.AUTO: 7.5 [PH] (ref 5–8)
PROT UR QL STRIP: NEGATIVE
RBC # UR STRIP: ABNORMAL /HPF
REF LAB TEST METHOD: ABNORMAL
SP GR UR STRIP: 1.02 (ref 1–1.03)
SQUAMOUS #/AREA URNS HPF: ABNORMAL /HPF
UROBILINOGEN UR QL STRIP: ABNORMAL
WBC # UR STRIP: ABNORMAL /HPF

## 2022-10-20 DIAGNOSIS — N30.00 ACUTE CYSTITIS WITHOUT HEMATURIA: Primary | ICD-10-CM

## 2022-10-20 LAB — BACTERIA SPEC AEROBE CULT: ABNORMAL

## 2022-10-20 RX ORDER — SULFAMETHOXAZOLE AND TRIMETHOPRIM 800; 160 MG/1; MG/1
1 TABLET ORAL 2 TIMES DAILY
Qty: 14 TABLET | Refills: 0 | Status: SHIPPED | OUTPATIENT
Start: 2022-10-20

## 2023-01-30 NOTE — PATIENT INSTRUCTIONS
Please proceed to the [facility], or your local health department to have the following vaccines administered:  Influenza    Once administered, please provide a copy from the health department to the [facility] to update your medical record.    [address] [place] [address].   [place]  [address] [phone]   [employer] Pregnancy And Lactation Text: This medication is Pregnancy Category X and is absolutely contraindicated during pregnancy. It is unknown if it is excreted in breast milk.

## (undated) DEVICE — CANN SMPL SOFTECH BIFLO ETCO2 A/M 7FT

## (undated) DEVICE — LIGATOR MULTIBAND SUPERVIEW 7 BX4

## (undated) DEVICE — Device